# Patient Record
Sex: FEMALE | Race: OTHER | HISPANIC OR LATINO | URBAN - METROPOLITAN AREA
[De-identification: names, ages, dates, MRNs, and addresses within clinical notes are randomized per-mention and may not be internally consistent; named-entity substitution may affect disease eponyms.]

---

## 2017-05-12 ENCOUNTER — EMERGENCY (EMERGENCY)
Facility: HOSPITAL | Age: 52
LOS: 1 days | Discharge: HOME | End: 2017-05-12
Attending: EMERGENCY MEDICINE | Admitting: EMERGENCY MEDICINE
Payer: COMMERCIAL

## 2017-05-12 VITALS
HEART RATE: 97 BPM | OXYGEN SATURATION: 100 % | SYSTOLIC BLOOD PRESSURE: 118 MMHG | RESPIRATION RATE: 18 BRPM | DIASTOLIC BLOOD PRESSURE: 72 MMHG | TEMPERATURE: 98 F

## 2017-05-12 DIAGNOSIS — R07.2 PRECORDIAL PAIN: ICD-10-CM

## 2017-05-12 LAB
ALBUMIN SERPL ELPH-MCNC: 3.7 G/DL — SIGNIFICANT CHANGE UP (ref 3.3–5)
ALP SERPL-CCNC: 83 U/L — SIGNIFICANT CHANGE UP (ref 40–120)
ALT FLD-CCNC: 12 U/L RC — SIGNIFICANT CHANGE UP (ref 10–45)
ANION GAP SERPL CALC-SCNC: 12 MMOL/L — SIGNIFICANT CHANGE UP (ref 5–17)
APTT BLD: 30.2 SEC — SIGNIFICANT CHANGE UP (ref 27.5–37.4)
AST SERPL-CCNC: 16 U/L — SIGNIFICANT CHANGE UP (ref 10–40)
BASOPHILS # BLD AUTO: 0 K/UL — SIGNIFICANT CHANGE UP (ref 0–0.2)
BASOPHILS NFR BLD AUTO: 0.4 % — SIGNIFICANT CHANGE UP (ref 0–2)
BILIRUB SERPL-MCNC: 0.3 MG/DL — SIGNIFICANT CHANGE UP (ref 0.2–1.2)
BUN SERPL-MCNC: 13 MG/DL — SIGNIFICANT CHANGE UP (ref 7–23)
CALCIUM SERPL-MCNC: 9.3 MG/DL — SIGNIFICANT CHANGE UP (ref 8.4–10.5)
CHLORIDE SERPL-SCNC: 103 MMOL/L — SIGNIFICANT CHANGE UP (ref 96–108)
CK MB CFR SERPL CALC: 1 NG/ML — SIGNIFICANT CHANGE UP (ref 0–3.8)
CK SERPL-CCNC: 53 U/L — SIGNIFICANT CHANGE UP (ref 25–170)
CO2 SERPL-SCNC: 24 MMOL/L — SIGNIFICANT CHANGE UP (ref 22–31)
CREAT SERPL-MCNC: 0.76 MG/DL — SIGNIFICANT CHANGE UP (ref 0.5–1.3)
EOSINOPHIL # BLD AUTO: 0.1 K/UL — SIGNIFICANT CHANGE UP (ref 0–0.5)
EOSINOPHIL NFR BLD AUTO: 1.2 % — SIGNIFICANT CHANGE UP (ref 0–6)
GAS PNL BLDV: SIGNIFICANT CHANGE UP
GLUCOSE SERPL-MCNC: 112 MG/DL — HIGH (ref 70–99)
HCG SERPL-ACNC: <2 MIU/ML — SIGNIFICANT CHANGE UP
HCT VFR BLD CALC: 33.6 % — LOW (ref 34.5–45)
HGB BLD-MCNC: 11.1 G/DL — LOW (ref 11.5–15.5)
INR BLD: 1.01 RATIO — SIGNIFICANT CHANGE UP (ref 0.88–1.16)
LIDOCAIN IGE QN: 30 U/L — SIGNIFICANT CHANGE UP (ref 7–60)
LYMPHOCYTES # BLD AUTO: 2.7 K/UL — SIGNIFICANT CHANGE UP (ref 1–3.3)
LYMPHOCYTES # BLD AUTO: 27.5 % — SIGNIFICANT CHANGE UP (ref 13–44)
MCHC RBC-ENTMCNC: 25.3 PG — LOW (ref 27–34)
MCHC RBC-ENTMCNC: 33 GM/DL — SIGNIFICANT CHANGE UP (ref 32–36)
MCV RBC AUTO: 76.5 FL — LOW (ref 80–100)
MONOCYTES # BLD AUTO: 0.7 K/UL — SIGNIFICANT CHANGE UP (ref 0–0.9)
MONOCYTES NFR BLD AUTO: 7.5 % — SIGNIFICANT CHANGE UP (ref 2–14)
NEUTROPHILS # BLD AUTO: 6.3 K/UL — SIGNIFICANT CHANGE UP (ref 1.8–7.4)
NEUTROPHILS NFR BLD AUTO: 63.3 % — SIGNIFICANT CHANGE UP (ref 43–77)
PLATELET # BLD AUTO: 236 K/UL — SIGNIFICANT CHANGE UP (ref 150–400)
POTASSIUM SERPL-MCNC: 4 MMOL/L — SIGNIFICANT CHANGE UP (ref 3.5–5.3)
POTASSIUM SERPL-SCNC: 4 MMOL/L — SIGNIFICANT CHANGE UP (ref 3.5–5.3)
PROT SERPL-MCNC: 7 G/DL — SIGNIFICANT CHANGE UP (ref 6–8.3)
PROTHROM AB SERPL-ACNC: 11 SEC — SIGNIFICANT CHANGE UP (ref 9.8–12.7)
RBC # BLD: 4.39 M/UL — SIGNIFICANT CHANGE UP (ref 3.8–5.2)
RBC # FLD: 16 % — HIGH (ref 10.3–14.5)
SODIUM SERPL-SCNC: 139 MMOL/L — SIGNIFICANT CHANGE UP (ref 135–145)
TROPONIN T SERPL-MCNC: <0.01 NG/ML — SIGNIFICANT CHANGE UP (ref 0–0.06)
WBC # BLD: 9.9 K/UL — SIGNIFICANT CHANGE UP (ref 3.8–10.5)
WBC # FLD AUTO: 9.9 K/UL — SIGNIFICANT CHANGE UP (ref 3.8–10.5)

## 2017-05-12 PROCEDURE — 76705 ECHO EXAM OF ABDOMEN: CPT | Mod: 26

## 2017-05-12 PROCEDURE — 71010: CPT | Mod: 26

## 2017-05-12 PROCEDURE — 99285 EMERGENCY DEPT VISIT HI MDM: CPT | Mod: 25

## 2017-05-12 PROCEDURE — 93010 ELECTROCARDIOGRAM REPORT: CPT

## 2017-05-12 RX ORDER — ASPIRIN/CALCIUM CARB/MAGNESIUM 324 MG
324 TABLET ORAL ONCE
Qty: 0 | Refills: 0 | Status: COMPLETED | OUTPATIENT
Start: 2017-05-12 | End: 2017-05-12

## 2017-05-12 RX ORDER — ASPIRIN/CALCIUM CARB/MAGNESIUM 324 MG
234 TABLET ORAL ONCE
Qty: 0 | Refills: 0 | Status: DISCONTINUED | OUTPATIENT
Start: 2017-05-12 | End: 2017-05-12

## 2017-05-12 RX ORDER — SODIUM CHLORIDE 9 MG/ML
1000 INJECTION INTRAMUSCULAR; INTRAVENOUS; SUBCUTANEOUS ONCE
Qty: 0 | Refills: 0 | Status: COMPLETED | OUTPATIENT
Start: 2017-05-12 | End: 2017-05-12

## 2017-05-12 RX ORDER — ASPIRIN/CALCIUM CARB/MAGNESIUM 324 MG
324 TABLET ORAL DAILY
Qty: 0 | Refills: 0 | Status: DISCONTINUED | OUTPATIENT
Start: 2017-05-12 | End: 2017-05-12

## 2017-05-12 RX ORDER — FAMOTIDINE 10 MG/ML
20 INJECTION INTRAVENOUS ONCE
Qty: 0 | Refills: 0 | Status: COMPLETED | OUTPATIENT
Start: 2017-05-12 | End: 2017-05-12

## 2017-05-12 RX ADMIN — Medication 324 MILLIGRAM(S): at 22:30

## 2017-05-12 RX ADMIN — FAMOTIDINE 20 MILLIGRAM(S): 10 INJECTION INTRAVENOUS at 22:30

## 2017-05-12 RX ADMIN — SODIUM CHLORIDE 1000 MILLILITER(S): 9 INJECTION INTRAMUSCULAR; INTRAVENOUS; SUBCUTANEOUS at 22:30

## 2017-05-12 RX ADMIN — Medication 30 MILLILITER(S): at 22:30

## 2017-05-12 NOTE — ED ADULT NURSE NOTE - CHIEF COMPLAINT QUOTE
cp cp    2141: attempted to do an EKG at 2131, pt states she has to go to the bathroom and been in there since.

## 2017-05-12 NOTE — ED ADULT NURSE NOTE - OBJECTIVE STATEMENT
51 year old female A&OX3 PMHX of CAD, HTN, Carotid artery occlusion, presents with chest pain radiating to the right arm since 1700 today. Patient states that she was sitting in her car when pain began. Patient also notes that pain is accompanied by nausea. Patient also reports weakness in bilateral lower extremities. Patient denies shortness of breath, fevers, chills.

## 2017-05-12 NOTE — ED PROVIDER NOTE - PLAN OF CARE
You were seen in the ED for chest pain, your blood work and CAT scans do not show any concerning abnormalities at this time. It is not entirely clear what caused the pain however it does not appear to be anything immediately dangerous.     Please see our heart doctors in 2-3 days to schedule a stress test of the heart.     Return to the ED for worsening pain, trouble breathing or any other concerns

## 2017-05-12 NOTE — ED PROVIDER NOTE - CARE PLAN
Principal Discharge DX:	Chest pain  Instructions for follow-up, activity and diet:	You were seen in the ED for chest pain, your blood work and CAT scans do not show any concerning abnormalities at this time. It is not entirely clear what caused the pain however it does not appear to be anything immediately dangerous.     Please see our heart doctors in 2-3 days to schedule a stress test of the heart.     Return to the ED for worsening pain, trouble breathing or any other concerns

## 2017-05-12 NOTE — ED PROVIDER NOTE - SHIFT CHANGE DETAILS
**ATTENDING ADDENDUM (Dr. Jorge Acosta): (1) followup serial troponin at 04:20AM (2) followup formal CT readings (3) serial reassessments (4) IF serial reassessments demonstrate clinical improvement, and if ED diagnostics are improved, consider discharge home with close outpatient followup with primary care physician/provider (may require translation services)

## 2017-05-12 NOTE — ED PROVIDER NOTE - MEDICAL DECISION MAKING DETAILS
**ATTENDING MEDICAL DECISION MAKING/SYNTHESIS (Dr. Jorge Acosta): I have reviewed the Chief Complaint, the HPI, the ROS, and have directly performed and confirmed the findings on the Physical Examination. I have reviewed the medical decision making with all providers, as applicable. The PROBLEM REPRESENTATION at this time is: 51-year-old woman with history of obstructive sleep apnea, asthma, hyperlipidemia, Hypertension, and morbid obesity now presenting with acute substernal chest pain with radiation to the right shoulder and back associated with nausea and vomiting; took one baby aspirin at home prior to arrival with some improvement (but still with persistent back pain). The MOST LIKELY DIAGNOSIS, and the LIST OF DIFFERENTIAL DIAGNOSES, includes (but is not limited to) the following: acute coronary syndrome (considered, HEART score=3, likely delta troponins), pulmonary embolism/deep venous thrombosis (unlikely given presentation and clinical findings), GI-associated e.g. acute biliary disease (e.g. cholelithiasis, cholecystitis, or cholangitis), peptic ulcer, pancreatitis, or equivalent (possible), dehydration, electrolyte-metabolic-endocrine derangements, pulmonary-associated (unlikely given presentation and clinical findings), vascular e.g. AAA or aortic dissection (unlikely, bilateral pulses and BPs in both arms appear to be symmetrical). The likelihood of each of these diagnoses has been appropriately considered in the context of this patient's presentation and my evaluation. PLAN: as described in EMR, including diagnostics, therapeutics and consultation as clinically warranted. I will continue to reevaluate the patient, including the results of all testing, and monitor response to therapy throughout the patient's course in the ED.

## 2017-05-12 NOTE — ED PROVIDER NOTE - OBJECTIVE STATEMENT
51yF obese, h/o HTN, on daily ASA, ANIVAL on CPAP, asthma presents after episode of sudden onset substernal chest paint hat began at rest radiated to her R arm and back. Associated with nausea and vomiting. Pain improved after 1 baby ASA but now with persistent back pain. Felt like she was "having a heart attack"  Never smoker. No etoh or illicit substances 51yF obese, h/o HTN, on daily ASA, ANIVAL on CPAP, asthma presents after episode of sudden onset substernal chest paint hat began at rest radiated to her R arm and back. Associated with nausea and vomiting. Pain improved after 1 baby ASA but now with persistent back pain. Felt like she was "having a heart attack"  Never smoker. No etoh or illicit substances. No exogenous estrogen. Unknown provoking factors.  PMD/Cards Kiko Morris 51yF obese, h/o HTN, on daily ASA, ANIVAL on CPAP, asthma presents after episode of sudden onset substernal chest paint hat began at rest radiated to her R arm and back. Associated with nausea and vomiting. Pain improved after 1 baby ASA but now with persistent back pain. Felt like she was "having a heart attack"  Never smoker. No etoh or illicit substances. No exogenous estrogen. Unknown provoking factors.  PMD/Cards Kiko Morris (unattached) 51yF obese, h/o HTN, on daily ASA, ANIVAL on CPAP, asthma presents after episode of sudden onset substernal chest paint hat began at rest radiated to her R arm and back. Associated with nausea and vomiting. Pain improved after 1 baby ASA but now with persistent back pain. Felt like she was "having a heart attack"  Never smoker. No etoh or illicit substances. No exogenous estrogen. Unknown provoking factors.  PMD/Cards Kiko Morris (unattached)  **ATTENDING ADDENDUM (Dr. Jorge Acosta): I attest that I have directly and personally interviewed and examined this patient and elicited a comparable history of present illness and review of systems as documented, along with my EM resident. I attest that I have made significant contributions to the documentation where necessary and as noted in the EMR.

## 2017-05-12 NOTE — ED PROVIDER NOTE - ATTENDING CONTRIBUTION TO CARE
**ATTENDING ADDENDUM (Dr. Jorge Acosta): I attest that I have directly examined this patient and reviewed and formulated the diagnostic and therapeutic management plan in collaboration with the EM resident. Please see MDM note and remainder of EMR for findings from CC, HPI, ROS, and PE. (Ralph)

## 2017-05-12 NOTE — ED PROVIDER NOTE - PROGRESS NOTE DETAILS
**ATTENDING ADDENDUM (Dr. Jorge Acosta): patient serially evaluated throughout ED course. NO acute deterioration up to this time in the ED. Some epigastric pain, some chest pain with radiation to the back and to the right shoulder/arm noted. Appears without severe distress at this time. Aspirin and other therapeutics as ordered in the EMR. HEART score calculated = 3 (female, hyperlipidemia, Hypertension, morbid obesity). Agree with goals/plan of ED care as noted in the EMR. Will continue to observe and monitor closely. Anticipatory guidance provided. (NOTE: EM resident Ralph's discussion made with certified  via phone). **ATTENDING ADDENDUM (Dr. Jorge Acosta): NO acute deterioration up to this time in the ED. ED diagnostics available up to this time reviewed and noted. First troponin non-diagnostic. CXR without evidence of emergent findings. Awaiting completion and readings of CTA chest/abdomen/pelvis. US noted; NO evidence of acute biliary disease (e.g. cholelithiasis, cholecystitis, or cholangitis), pancreatitis, or acute surgical abdomen at this time. Agree with delta troponin at 04:20. Will continue to observe and monitor closely. Attending MD Brunson: patient received in sign out, cardiac biomarkers negative x 2, ACS ruled out. Patient asymptomatic currently. Patient provided instructions for cardiology follow up for outpatient stress test

## 2017-05-13 VITALS
HEART RATE: 88 BPM | OXYGEN SATURATION: 100 % | TEMPERATURE: 99 F | SYSTOLIC BLOOD PRESSURE: 145 MMHG | RESPIRATION RATE: 18 BRPM | DIASTOLIC BLOOD PRESSURE: 80 MMHG

## 2017-05-13 LAB — TROPONIN T SERPL-MCNC: <0.01 NG/ML — SIGNIFICANT CHANGE UP (ref 0–0.06)

## 2017-05-13 PROCEDURE — 84702 CHORIONIC GONADOTROPIN TEST: CPT

## 2017-05-13 PROCEDURE — 83605 ASSAY OF LACTIC ACID: CPT

## 2017-05-13 PROCEDURE — 83690 ASSAY OF LIPASE: CPT

## 2017-05-13 PROCEDURE — 85610 PROTHROMBIN TIME: CPT

## 2017-05-13 PROCEDURE — 85730 THROMBOPLASTIN TIME PARTIAL: CPT

## 2017-05-13 PROCEDURE — 74174 CTA ABD&PLVS W/CONTRAST: CPT | Mod: 26

## 2017-05-13 PROCEDURE — 71045 X-RAY EXAM CHEST 1 VIEW: CPT

## 2017-05-13 PROCEDURE — 82947 ASSAY GLUCOSE BLOOD QUANT: CPT

## 2017-05-13 PROCEDURE — 82550 ASSAY OF CK (CPK): CPT

## 2017-05-13 PROCEDURE — 71275 CT ANGIOGRAPHY CHEST: CPT

## 2017-05-13 PROCEDURE — 74174 CTA ABD&PLVS W/CONTRAST: CPT

## 2017-05-13 PROCEDURE — 84132 ASSAY OF SERUM POTASSIUM: CPT

## 2017-05-13 PROCEDURE — 84295 ASSAY OF SERUM SODIUM: CPT

## 2017-05-13 PROCEDURE — 96374 THER/PROPH/DIAG INJ IV PUSH: CPT | Mod: XU

## 2017-05-13 PROCEDURE — 85014 HEMATOCRIT: CPT

## 2017-05-13 PROCEDURE — 82553 CREATINE MB FRACTION: CPT

## 2017-05-13 PROCEDURE — 82803 BLOOD GASES ANY COMBINATION: CPT

## 2017-05-13 PROCEDURE — 84484 ASSAY OF TROPONIN QUANT: CPT

## 2017-05-13 PROCEDURE — 71275 CT ANGIOGRAPHY CHEST: CPT | Mod: 26

## 2017-05-13 PROCEDURE — 80053 COMPREHEN METABOLIC PANEL: CPT

## 2017-05-13 PROCEDURE — 82435 ASSAY OF BLOOD CHLORIDE: CPT

## 2017-05-13 PROCEDURE — 93005 ELECTROCARDIOGRAM TRACING: CPT

## 2017-05-13 PROCEDURE — 85027 COMPLETE CBC AUTOMATED: CPT

## 2017-05-13 PROCEDURE — 99284 EMERGENCY DEPT VISIT MOD MDM: CPT | Mod: 25

## 2017-05-13 PROCEDURE — 82330 ASSAY OF CALCIUM: CPT

## 2017-05-13 PROCEDURE — 76705 ECHO EXAM OF ABDOMEN: CPT

## 2017-05-13 NOTE — ED ADULT NURSE REASSESSMENT NOTE - NS ED NURSE REASSESS COMMENT FT1
received report imer Mace RN. patient resting comfortably in bed. in no acute distress and denies pain at this time. repeat trop due at 0430.

## 2017-05-18 ENCOUNTER — INPATIENT (INPATIENT)
Facility: HOSPITAL | Age: 52
LOS: 2 days | Discharge: HOME | DRG: 313 | End: 2017-05-21
Attending: INTERNAL MEDICINE | Admitting: INTERNAL MEDICINE
Payer: COMMERCIAL

## 2017-05-18 VITALS
SYSTOLIC BLOOD PRESSURE: 104 MMHG | DIASTOLIC BLOOD PRESSURE: 65 MMHG | TEMPERATURE: 98 F | OXYGEN SATURATION: 100 % | RESPIRATION RATE: 20 BRPM | HEART RATE: 90 BPM

## 2017-05-18 LAB
ALBUMIN SERPL ELPH-MCNC: 3.4 G/DL — SIGNIFICANT CHANGE UP (ref 3.3–5)
ALP SERPL-CCNC: 81 U/L — SIGNIFICANT CHANGE UP (ref 40–120)
ALT FLD-CCNC: 14 U/L RC — SIGNIFICANT CHANGE UP (ref 10–45)
ANION GAP SERPL CALC-SCNC: 11 MMOL/L — SIGNIFICANT CHANGE UP (ref 5–17)
APTT BLD: 23.4 SEC — LOW (ref 27.5–37.4)
AST SERPL-CCNC: 21 U/L — SIGNIFICANT CHANGE UP (ref 10–40)
BASOPHILS # BLD AUTO: 0 K/UL — SIGNIFICANT CHANGE UP (ref 0–0.2)
BASOPHILS NFR BLD AUTO: 0.3 % — SIGNIFICANT CHANGE UP (ref 0–2)
BILIRUB SERPL-MCNC: 0.2 MG/DL — SIGNIFICANT CHANGE UP (ref 0.2–1.2)
BUN SERPL-MCNC: 16 MG/DL — SIGNIFICANT CHANGE UP (ref 7–23)
CALCIUM SERPL-MCNC: 9.1 MG/DL — SIGNIFICANT CHANGE UP (ref 8.4–10.5)
CHLORIDE SERPL-SCNC: 105 MMOL/L — SIGNIFICANT CHANGE UP (ref 96–108)
CO2 SERPL-SCNC: 23 MMOL/L — SIGNIFICANT CHANGE UP (ref 22–31)
CREAT SERPL-MCNC: 0.76 MG/DL — SIGNIFICANT CHANGE UP (ref 0.5–1.3)
EOSINOPHIL # BLD AUTO: 0.1 K/UL — SIGNIFICANT CHANGE UP (ref 0–0.5)
EOSINOPHIL NFR BLD AUTO: 1.1 % — SIGNIFICANT CHANGE UP (ref 0–6)
GAS PNL BLDV: SIGNIFICANT CHANGE UP
GLUCOSE SERPL-MCNC: 99 MG/DL — SIGNIFICANT CHANGE UP (ref 70–99)
HCG SERPL QL: NEGATIVE — SIGNIFICANT CHANGE UP
HCT VFR BLD CALC: 33.2 % — LOW (ref 34.5–45)
HGB BLD-MCNC: 10.7 G/DL — LOW (ref 11.5–15.5)
INR BLD: 1.02 RATIO — SIGNIFICANT CHANGE UP (ref 0.88–1.16)
LYMPHOCYTES # BLD AUTO: 3.3 K/UL — SIGNIFICANT CHANGE UP (ref 1–3.3)
LYMPHOCYTES # BLD AUTO: 37.7 % — SIGNIFICANT CHANGE UP (ref 13–44)
MCHC RBC-ENTMCNC: 24.8 PG — LOW (ref 27–34)
MCHC RBC-ENTMCNC: 32.3 GM/DL — SIGNIFICANT CHANGE UP (ref 32–36)
MCV RBC AUTO: 76.9 FL — LOW (ref 80–100)
MONOCYTES # BLD AUTO: 0.6 K/UL — SIGNIFICANT CHANGE UP (ref 0–0.9)
MONOCYTES NFR BLD AUTO: 7.4 % — SIGNIFICANT CHANGE UP (ref 2–14)
NEUTROPHILS # BLD AUTO: 4.7 K/UL — SIGNIFICANT CHANGE UP (ref 1.8–7.4)
NEUTROPHILS NFR BLD AUTO: 53.5 % — SIGNIFICANT CHANGE UP (ref 43–77)
PLATELET # BLD AUTO: 247 K/UL — SIGNIFICANT CHANGE UP (ref 150–400)
POTASSIUM SERPL-MCNC: 4.6 MMOL/L — SIGNIFICANT CHANGE UP (ref 3.5–5.3)
POTASSIUM SERPL-SCNC: 4.6 MMOL/L — SIGNIFICANT CHANGE UP (ref 3.5–5.3)
PROT SERPL-MCNC: 6.9 G/DL — SIGNIFICANT CHANGE UP (ref 6–8.3)
PROTHROM AB SERPL-ACNC: 11.1 SEC — SIGNIFICANT CHANGE UP (ref 9.8–12.7)
RBC # BLD: 4.31 M/UL — SIGNIFICANT CHANGE UP (ref 3.8–5.2)
RBC # FLD: 15.9 % — HIGH (ref 10.3–14.5)
SODIUM SERPL-SCNC: 139 MMOL/L — SIGNIFICANT CHANGE UP (ref 135–145)
TROPONIN T SERPL-MCNC: <0.01 NG/ML — SIGNIFICANT CHANGE UP (ref 0–0.06)
WBC # BLD: 8.7 K/UL — SIGNIFICANT CHANGE UP (ref 3.8–10.5)
WBC # FLD AUTO: 8.7 K/UL — SIGNIFICANT CHANGE UP (ref 3.8–10.5)

## 2017-05-18 PROCEDURE — 99285 EMERGENCY DEPT VISIT HI MDM: CPT | Mod: 25

## 2017-05-18 PROCEDURE — 93010 ELECTROCARDIOGRAM REPORT: CPT

## 2017-05-18 RX ORDER — SODIUM CHLORIDE 9 MG/ML
3 INJECTION INTRAMUSCULAR; INTRAVENOUS; SUBCUTANEOUS ONCE
Qty: 0 | Refills: 0 | Status: COMPLETED | OUTPATIENT
Start: 2017-05-18 | End: 2017-05-18

## 2017-05-18 RX ORDER — ASPIRIN/CALCIUM CARB/MAGNESIUM 324 MG
325 TABLET ORAL ONCE
Qty: 0 | Refills: 0 | Status: COMPLETED | OUTPATIENT
Start: 2017-05-18 | End: 2017-05-18

## 2017-05-18 RX ADMIN — Medication 325 MILLIGRAM(S): at 22:21

## 2017-05-18 RX ADMIN — SODIUM CHLORIDE 3 MILLILITER(S): 9 INJECTION INTRAMUSCULAR; INTRAVENOUS; SUBCUTANEOUS at 22:21

## 2017-05-18 NOTE — ED PROVIDER NOTE - CARE PLAN
Principal Discharge DX:	Chest pain  Instructions for follow-up, activity and diet:	Patient wishes to leave the emergency department at this time.  The patient understands that they are leaving against medical advice despite the risk of missing a potentially serious diagnosis which may lead to injury, disability and/or death.  The patient demonstrates understanding of all risks, is awake and alert and demonstrates competence to make medical decisions.  I was unable to convince the patient to stay for further workup and monitoring.  The patient was given an opportunity to ask any questions.   The patient understands that they may return at any time if desired.  I discussed the importance of close, prompt medical follow-up. Principal Discharge DX:	Chest pain

## 2017-05-18 NOTE — ED PROVIDER NOTE - OBJECTIVE STATEMENT
51 F pmh asthma, HTN, GERD presents with chest pain 51 F pmh asthma, HTN, GERD presents with sudden onset chest pain while riding in car.  Pt was in different ED last week for similar symptoms and discharged.  Pt states chest pain is central, intermittent, 5/10 pressure, associated with nausea, weakness, and shortness of breath.  Pt is not currently experiencing pain.  Pt states it feels like her asthma. but she is not wheezing.  Pt denies abd pain, vomiting, diarrhea, fever/chills, dysuria, rash.    PMD Dr. Arturo Han, DO

## 2017-05-18 NOTE — ED PROVIDER NOTE - ATTENDING CONTRIBUTION TO CARE
Private Physician Kiko MorrisOakley  51y female pmh Hypertension,asthma,hld,no habits no travel, cancer, no ocp; pt comes to ed complains of sudden onset of chest pain dizzyness and weakness with nausea, without vomiting. and shortness of breath, No feeling mod improved. No radiation. 4/10 squeezing. waxing and waning. PE WDWN mild distress. Chest clear anterior & posterior abd soft +bs neuro no focal defects. CV no rubs, gallops or murmurs  Piero Mackenzie MD, Facep

## 2017-05-18 NOTE — ED PROVIDER NOTE - PROGRESS NOTE DETAILS
Dr Morris callled no answer  Piero Mackenzie MD, Facep Paged Unattached Cards, NA  Called Dr Hickey (known relationship with Dr Morris in past)   Dr Hickey will accept pt no bed cdu  Piero Mackenzie MD, Facep Pt request immediate dc home. Risks understood and accepted, including MI. Will follow up as opt  Piero Mackenzie MD, Facep Endorsed to Dr Dennis Mackenzie MD, Facep Pt request immediate dc home. Risks understood and accepted, including MI. Pt contemplating decision. If not will follow up as opt.  Piero Mackenzie MD, Facep Pt does not want to be admitted and wants to leave hospital. c Pt request to ama, then changes mind and wants to be admitted.

## 2017-05-18 NOTE — ED ADULT NURSE NOTE - NS ED NURSE DISCH DISPOSITION
AMA (saw a physician/midlevel provider and clinician was able to provide reasons for staying for treatment & form is signed) Admitted

## 2017-05-18 NOTE — ED PROVIDER NOTE - CONDUCTED A DETAILED DISCUSSION WITH PATIENT AND/OR GUARDIAN REGARDING, MDM
need to admit/return to ED if symptoms worsen, persist or questions arise/radiology results/lab results

## 2017-05-18 NOTE — ED PROVIDER NOTE - PHYSICAL EXAMINATION
Gen: NAD, AOx3  Head: NCAT  HEENT: PERRL, oral mucosa moist, normal conjunctiva  Lung: CTAB, no respiratory distress  CV: rrr, no murmurs, Normal perfusion  Abd: soft, NTND  MSK: No edema, no visible deformities  Neuro: No focal neurologic deficits, normal gait  Skin: No rash   Psych: normal affect   - Mindy Han, DO

## 2017-05-18 NOTE — ED ADULT NURSE NOTE - OBJECTIVE STATEMENT
2250 pt 51yf aox4 presented w/ cp denies cardiac hx, vss pt noted morbidly obese, resident at bedside/pt w/ family/vss/jesse

## 2017-05-19 ENCOUNTER — TRANSCRIPTION ENCOUNTER (OUTPATIENT)
Age: 52
End: 2017-05-19

## 2017-05-19 DIAGNOSIS — I10 ESSENTIAL (PRIMARY) HYPERTENSION: ICD-10-CM

## 2017-05-19 DIAGNOSIS — M19.90 UNSPECIFIED OSTEOARTHRITIS, UNSPECIFIED SITE: ICD-10-CM

## 2017-05-19 DIAGNOSIS — R07.9 CHEST PAIN, UNSPECIFIED: ICD-10-CM

## 2017-05-19 DIAGNOSIS — Z29.9 ENCOUNTER FOR PROPHYLACTIC MEASURES, UNSPECIFIED: ICD-10-CM

## 2017-05-19 DIAGNOSIS — G43.909 MIGRAINE, UNSPECIFIED, NOT INTRACTABLE, WITHOUT STATUS MIGRAINOSUS: ICD-10-CM

## 2017-05-19 DIAGNOSIS — K21.9 GASTRO-ESOPHAGEAL REFLUX DISEASE WITHOUT ESOPHAGITIS: ICD-10-CM

## 2017-05-19 LAB
BASOPHILS # BLD AUTO: 0.02 K/UL — SIGNIFICANT CHANGE UP (ref 0–0.2)
BASOPHILS NFR BLD AUTO: 0.2 % — SIGNIFICANT CHANGE UP (ref 0–2)
CK MB CFR SERPL CALC: 1 NG/ML — SIGNIFICANT CHANGE UP (ref 0–3.8)
EOSINOPHIL # BLD AUTO: 0.11 K/UL — SIGNIFICANT CHANGE UP (ref 0–0.5)
EOSINOPHIL NFR BLD AUTO: 1.3 % — SIGNIFICANT CHANGE UP (ref 0–6)
FERRITIN SERPL-MCNC: 8 NG/ML — LOW (ref 15–150)
FOLATE SERPL-MCNC: >20 NG/ML — SIGNIFICANT CHANGE UP (ref 4.8–24.2)
HBA1C BLD-MCNC: 6 % — HIGH (ref 4–5.6)
HCT VFR BLD CALC: 33.1 % — LOW (ref 34.5–45)
HGB BLD-MCNC: 10 G/DL — LOW (ref 11.5–15.5)
IMM GRANULOCYTES NFR BLD AUTO: 0.1 % — SIGNIFICANT CHANGE UP (ref 0–1.5)
IRON SATN MFR SERPL: 29 UG/DL — LOW (ref 30–160)
IRON SATN MFR SERPL: 9 % — LOW (ref 14–50)
LYMPHOCYTES # BLD AUTO: 2.69 K/UL — SIGNIFICANT CHANGE UP (ref 1–3.3)
LYMPHOCYTES # BLD AUTO: 32.1 % — SIGNIFICANT CHANGE UP (ref 13–44)
MCHC RBC-ENTMCNC: 22.6 PG — LOW (ref 27–34)
MCHC RBC-ENTMCNC: 30.2 GM/DL — LOW (ref 32–36)
MCV RBC AUTO: 74.9 FL — LOW (ref 80–100)
MONOCYTES # BLD AUTO: 0.52 K/UL — SIGNIFICANT CHANGE UP (ref 0–0.9)
MONOCYTES NFR BLD AUTO: 6.2 % — SIGNIFICANT CHANGE UP (ref 2–14)
NEUTROPHILS # BLD AUTO: 5.02 K/UL — SIGNIFICANT CHANGE UP (ref 1.8–7.4)
NEUTROPHILS NFR BLD AUTO: 60.1 % — SIGNIFICANT CHANGE UP (ref 43–77)
PLATELET # BLD AUTO: 244 K/UL — SIGNIFICANT CHANGE UP (ref 150–400)
RBC # BLD: 4.42 M/UL — SIGNIFICANT CHANGE UP (ref 3.8–5.2)
RBC # FLD: 17.3 % — HIGH (ref 10.3–14.5)
TIBC SERPL-MCNC: 330 UG/DL — SIGNIFICANT CHANGE UP (ref 220–430)
TROPONIN T SERPL-MCNC: <0.01 NG/ML — SIGNIFICANT CHANGE UP (ref 0–0.06)
UIBC SERPL-MCNC: 301 UG/DL — SIGNIFICANT CHANGE UP (ref 110–370)
VIT B12 SERPL-MCNC: 283 PG/ML — SIGNIFICANT CHANGE UP (ref 243–894)
WBC # BLD: 8.37 K/UL — SIGNIFICANT CHANGE UP (ref 3.8–10.5)
WBC # FLD AUTO: 8.37 K/UL — SIGNIFICANT CHANGE UP (ref 3.8–10.5)

## 2017-05-19 PROCEDURE — 71010: CPT | Mod: 26

## 2017-05-19 PROCEDURE — 99223 1ST HOSP IP/OBS HIGH 75: CPT

## 2017-05-19 RX ORDER — PROPRANOLOL HCL 160 MG
20 CAPSULE, EXTENDED RELEASE 24HR ORAL
Qty: 0 | Refills: 0 | Status: DISCONTINUED | OUTPATIENT
Start: 2017-05-19 | End: 2017-05-21

## 2017-05-19 RX ORDER — LOSARTAN POTASSIUM 100 MG/1
50 TABLET, FILM COATED ORAL DAILY
Qty: 0 | Refills: 0 | Status: DISCONTINUED | OUTPATIENT
Start: 2017-05-19 | End: 2017-05-21

## 2017-05-19 RX ORDER — SULINDAC 200 MG/1
200 TABLET ORAL
Qty: 0 | Refills: 0 | Status: DISCONTINUED | OUTPATIENT
Start: 2017-05-19 | End: 2017-05-21

## 2017-05-19 RX ORDER — PANTOPRAZOLE SODIUM 20 MG/1
1 TABLET, DELAYED RELEASE ORAL
Qty: 30 | Refills: 0
Start: 2017-05-19 | End: 2017-06-18

## 2017-05-19 RX ORDER — ASPIRIN/CALCIUM CARB/MAGNESIUM 324 MG
1 TABLET ORAL
Qty: 0 | Refills: 0 | COMMUNITY

## 2017-05-19 RX ORDER — PANTOPRAZOLE SODIUM 20 MG/1
40 TABLET, DELAYED RELEASE ORAL
Qty: 0 | Refills: 0 | Status: DISCONTINUED | OUTPATIENT
Start: 2017-05-19 | End: 2017-05-21

## 2017-05-19 RX ORDER — ASPIRIN/CALCIUM CARB/MAGNESIUM 324 MG
81 TABLET ORAL DAILY
Qty: 0 | Refills: 0 | Status: DISCONTINUED | OUTPATIENT
Start: 2017-05-19 | End: 2017-05-21

## 2017-05-19 RX ORDER — ATORVASTATIN CALCIUM 80 MG/1
40 TABLET, FILM COATED ORAL AT BEDTIME
Qty: 0 | Refills: 0 | Status: DISCONTINUED | OUTPATIENT
Start: 2017-05-19 | End: 2017-05-21

## 2017-05-19 RX ADMIN — Medication 81 MILLIGRAM(S): at 12:25

## 2017-05-19 RX ADMIN — Medication 20 MILLIGRAM(S): at 05:40

## 2017-05-19 RX ADMIN — SULINDAC 200 MILLIGRAM(S): 200 TABLET ORAL at 18:52

## 2017-05-19 RX ADMIN — ATORVASTATIN CALCIUM 40 MILLIGRAM(S): 80 TABLET, FILM COATED ORAL at 22:19

## 2017-05-19 RX ADMIN — SULINDAC 200 MILLIGRAM(S): 200 TABLET ORAL at 05:40

## 2017-05-19 RX ADMIN — SULINDAC 200 MILLIGRAM(S): 200 TABLET ORAL at 06:37

## 2017-05-19 RX ADMIN — SULINDAC 200 MILLIGRAM(S): 200 TABLET ORAL at 19:52

## 2017-05-19 RX ADMIN — PANTOPRAZOLE SODIUM 40 MILLIGRAM(S): 20 TABLET, DELAYED RELEASE ORAL at 05:40

## 2017-05-19 RX ADMIN — Medication 20 MILLIGRAM(S): at 18:52

## 2017-05-19 RX ADMIN — LOSARTAN POTASSIUM 50 MILLIGRAM(S): 100 TABLET, FILM COATED ORAL at 05:40

## 2017-05-19 NOTE — DISCHARGE NOTE ADULT - CARE PROVIDER_API CALL
Hank Seo), Cardiac Electrophysiology; Cardiovascular Disease; Internal Medicine  [id number] [address] [place] [address]  Phone: [phone]  Fax: [phone]

## 2017-05-19 NOTE — DISCHARGE NOTE ADULT - MEDICATION SUMMARY - MEDICATIONS TO TAKE
I will START or STAY ON the medications listed below when I get home from the hospital:    nabumetone 750 mg oral tablet  -- 1 tab(s) by mouth 2 times a day  -- Indication: For Migraine    Aspirin Enteric Coated 81 mg oral delayed release tablet  -- 1 tab(s) by mouth once a day  -- Indication: For Chest pain    losartan 50 mg oral tablet  -- 1 tab(s) by mouth once a day  -- Indication: For Benign Essential Hypertension    propranolol 20 mg oral tablet  -- 1 tab(s) by mouth 2 times a day  -- Indication: For Benign Essential Hypertension    atorvastatin 40 mg oral tablet  -- 1 tab(s) by mouth once a day (at bedtime)  -- Indication: For high cholestrol     ProAir HFA 90 mcg/inh inhalation aerosol  -- 2 puff(s) inhaled 4 times a day, As Needed  -- Indication: For Asthma    pantoprazole 40 mg oral delayed release tablet  -- 1 tab(s) by mouth once a day (before a meal)  -- Indication: For GERD (Gastroesophageal Reflux Disease)

## 2017-05-19 NOTE — DISCHARGE NOTE ADULT - ADDITIONAL INSTRUCTIONS
1. Please schedule an appointment with your cardiologist within 1 week for stress test  2. Please keep your appointment for your scheduled Stress Test 1. Please schedule an appointment with your cardiologist within 1 week    please had stress test negative .

## 2017-05-19 NOTE — DISCHARGE NOTE ADULT - CARE PLAN
Principal Discharge DX:	Chest pain, unspecified type  Goal:	resolved  Secondary Diagnosis:	Gastroesophageal reflux disease without esophagitis  Instructions for follow-up, activity and diet:	continue Protonix Principal Discharge DX:	Atypical chest pain  Goal:	resolved  Instructions for follow-up, activity and diet:	Low salt, low fat diet.   Weight management.   Take medications as prescribed.    No smoking.  Follow up appointments with your doctor(s)  as instructed.  Secondary Diagnosis:	Gastroesophageal reflux disease without esophagitis  Instructions for follow-up, activity and diet:	continue Protonix  Secondary Diagnosis:	Asthma  Instructions for follow-up, activity and diet:	Call your Health Care provider upon arrival home to make a follow up appointment within one week.  Take all inhalers as prescribed by your Health Care Provider.  Take steroids as prescribed by your Health Care Provider.  If your cough increases infrequency and severity and/or you have shortness of breath or increased shortness of breath call your Health Care Provider.  If you develop fever, chills, night sweats, malaise, and/or change in mental status call your Health care Provider.  Nutrition is very important.  Eat small frequent meals.  Increase your activity as tolerated.  Do not stay in bed all day  Secondary Diagnosis:	Benign Essential Hypertension  Instructions for follow-up, activity and diet:	Low salt diet  Activity as tolerated.  Take all medication as prescribed.  Follow up with your medical doctor for routine blood pressure monitoring at your next visit.  Notify your doctor if you have any of the following symptoms:   Dizziness, Lightheadedness, Blurry vision, Headache, Chest pain, Shortness of breath  Secondary Diagnosis:	Anemia  Instructions for follow-up, activity and diet:	monitor cbc  Secondary Diagnosis:	Arthritis  Instructions for follow-up, activity and diet:	continue with pain medication  Secondary Diagnosis:	Migraine  Instructions for follow-up, activity and diet:	continue with Pain medication

## 2017-05-19 NOTE — DISCHARGE NOTE ADULT - PATIENT PORTAL LINK FT
“You can access the [facility] Patient Portal, offered by [facility], by registering with the following website: [email or web address]”

## 2017-05-19 NOTE — DISCHARGE NOTE ADULT - SECONDARY DIAGNOSIS.
Gastroesophageal reflux disease without esophagitis Asthma Benign Essential Hypertension Anemia Arthritis Migraine

## 2017-05-19 NOTE — H&P ADULT. - LAB RESULTS AND INTERPRETATION
Labs personally reviewed and interpreted:  no leukocytosis, Hb 10.7, platelet 247  electrolytes normal  troponin <0.01  serum pregnancy negative

## 2017-05-19 NOTE — H&P ADULT. - NEGATIVE CARDIOVASCULAR SYMPTOMS
no orthopnea/no palpitations/no peripheral edema/no dyspnea on exertion/no paroxysmal nocturnal dyspnea

## 2017-05-19 NOTE — H&P ADULT. - OTHER
Old records reviewed:  CTA chest May 13, 2017: no thoracic aneurysm;  There are pulmonary nodules; 3mm adnexal cystic lesion; 0.7cm hypodense lesion in liver  Hb 11.1 May 12, 2017;   CXR no focal consolidation May 12, 2017

## 2017-05-19 NOTE — H&P ADULT. - FAMILY HISTORY
Mother  Still living? Unknown  Family history of uterine cancer, Age at diagnosis: Age Unknown  Family history of hypertension, Age at diagnosis: Age Unknown     Father  Still living? Unknown  Family history of hypertension, Age at diagnosis: Age Unknown

## 2017-05-19 NOTE — DISCHARGE NOTE ADULT - PLAN OF CARE
resolved continue Protonix Low salt, low fat diet.   Weight management.   Take medications as prescribed.    No smoking.  Follow up appointments with your doctor(s)  as instructed. continue with Pain medication continue with pain medication monitor cbc Call your Health Care provider upon arrival home to make a follow up appointment within one week.  Take all inhalers as prescribed by your Health Care Provider.  Take steroids as prescribed by your Health Care Provider.  If your cough increases infrequency and severity and/or you have shortness of breath or increased shortness of breath call your Health Care Provider.  If you develop fever, chills, night sweats, malaise, and/or change in mental status call your Health care Provider.  Nutrition is very important.  Eat small frequent meals.  Increase your activity as tolerated.  Do not stay in bed all day Low salt diet  Activity as tolerated.  Take all medication as prescribed.  Follow up with your medical doctor for routine blood pressure monitoring at your next visit.  Notify your doctor if you have any of the following symptoms:   Dizziness, Lightheadedness, Blurry vision, Headache, Chest pain, Shortness of breath

## 2017-05-19 NOTE — H&P ADULT. - PROBLEM SELECTOR PLAN 1
patient with chest pain, likely musculoskeletal vs. reflux related  will rule out ACS  trend cardiac enzymes  telemetry monitoring  c/w aspirin and atorvastatin  patient is scheduled for stress test as outpatient with her own cardiologist.

## 2017-05-19 NOTE — H&P ADULT. - HISTORY OF PRESENT ILLNESS
52 yo Female with PMH of GERD, HTN, asthma presents here with chest pain.  Patient states that she was in the car on the passenger seat when she suddenly started having chest pain.  Pain is located substernally, nonradiating, associated with diaphoresis and SOB.  Patient states she was also feeling panicky at that time because she did not take aspirin and atorvastatin the night before.  Pain did not change with breathing or position.  She says pain lasted for about 10 to 15 min.  She states that she had a stress test done one year ago and scheduled for another stress test next week with her cardiologist.  Currently patient denies any chest pain.     She had similar episode of chest pain last week, also had chest pain while she was on the passenger seat.  That time pain was worse and was taken to the hospital where she had EKG and CTA chest done, which were all negative and patient was discharged.

## 2017-05-20 LAB
HCV AB S/CO SERPL IA: 0.11 S/CO — SIGNIFICANT CHANGE UP
HCV AB SERPL-IMP: SIGNIFICANT CHANGE UP

## 2017-05-20 RX ADMIN — SULINDAC 200 MILLIGRAM(S): 200 TABLET ORAL at 17:53

## 2017-05-20 RX ADMIN — PANTOPRAZOLE SODIUM 40 MILLIGRAM(S): 20 TABLET, DELAYED RELEASE ORAL at 05:14

## 2017-05-20 RX ADMIN — Medication 20 MILLIGRAM(S): at 05:14

## 2017-05-20 RX ADMIN — Medication 81 MILLIGRAM(S): at 13:06

## 2017-05-20 RX ADMIN — SULINDAC 200 MILLIGRAM(S): 200 TABLET ORAL at 06:03

## 2017-05-20 RX ADMIN — Medication 20 MILLIGRAM(S): at 17:53

## 2017-05-20 RX ADMIN — ATORVASTATIN CALCIUM 40 MILLIGRAM(S): 80 TABLET, FILM COATED ORAL at 21:24

## 2017-05-20 RX ADMIN — SULINDAC 200 MILLIGRAM(S): 200 TABLET ORAL at 05:15

## 2017-05-20 RX ADMIN — LOSARTAN POTASSIUM 50 MILLIGRAM(S): 100 TABLET, FILM COATED ORAL at 05:15

## 2017-05-21 VITALS
TEMPERATURE: 98 F | SYSTOLIC BLOOD PRESSURE: 110 MMHG | RESPIRATION RATE: 17 BRPM | HEART RATE: 74 BPM | DIASTOLIC BLOOD PRESSURE: 74 MMHG | OXYGEN SATURATION: 97 %

## 2017-05-21 LAB
ALBUMIN SERPL ELPH-MCNC: 3.5 G/DL — SIGNIFICANT CHANGE UP (ref 3.3–5)
ALP SERPL-CCNC: 93 U/L — SIGNIFICANT CHANGE UP (ref 40–120)
ALT FLD-CCNC: 13 U/L — SIGNIFICANT CHANGE UP (ref 10–45)
ANION GAP SERPL CALC-SCNC: 11 MMOL/L — SIGNIFICANT CHANGE UP (ref 5–17)
ANION GAP SERPL CALC-SCNC: 22 MMOL/L — HIGH (ref 5–17)
AST SERPL-CCNC: 39 U/L — SIGNIFICANT CHANGE UP (ref 10–40)
BILIRUB SERPL-MCNC: 0.3 MG/DL — SIGNIFICANT CHANGE UP (ref 0.2–1.2)
BUN SERPL-MCNC: 16 MG/DL — SIGNIFICANT CHANGE UP (ref 7–23)
BUN SERPL-MCNC: 17 MG/DL — SIGNIFICANT CHANGE UP (ref 7–23)
CALCIUM SERPL-MCNC: 9.1 MG/DL — SIGNIFICANT CHANGE UP (ref 8.4–10.5)
CALCIUM SERPL-MCNC: 9.1 MG/DL — SIGNIFICANT CHANGE UP (ref 8.4–10.5)
CHLORIDE SERPL-SCNC: 104 MMOL/L — SIGNIFICANT CHANGE UP (ref 96–108)
CHLORIDE SERPL-SCNC: 107 MMOL/L — SIGNIFICANT CHANGE UP (ref 96–108)
CO2 SERPL-SCNC: 12 MMOL/L — LOW (ref 22–31)
CO2 SERPL-SCNC: 25 MMOL/L — SIGNIFICANT CHANGE UP (ref 22–31)
CREAT SERPL-MCNC: 0.66 MG/DL — SIGNIFICANT CHANGE UP (ref 0.5–1.3)
CREAT SERPL-MCNC: 0.69 MG/DL — SIGNIFICANT CHANGE UP (ref 0.5–1.3)
GLUCOSE SERPL-MCNC: 112 MG/DL — HIGH (ref 70–99)
GLUCOSE SERPL-MCNC: 43 MG/DL — CRITICAL LOW (ref 70–99)
HCT VFR BLD CALC: 34.7 % — SIGNIFICANT CHANGE UP (ref 34.5–45)
HGB BLD-MCNC: 11.3 G/DL — LOW (ref 11.5–15.5)
MAGNESIUM SERPL-MCNC: 2.1 MG/DL — SIGNIFICANT CHANGE UP (ref 1.6–2.6)
MCHC RBC-ENTMCNC: 25.1 PG — LOW (ref 27–34)
MCHC RBC-ENTMCNC: 32.6 GM/DL — SIGNIFICANT CHANGE UP (ref 32–36)
MCV RBC AUTO: 77.1 FL — LOW (ref 80–100)
PHOSPHATE SERPL-MCNC: 4.2 MG/DL — SIGNIFICANT CHANGE UP (ref 2.5–4.5)
PLATELET # BLD AUTO: 228 K/UL — SIGNIFICANT CHANGE UP (ref 150–400)
POTASSIUM SERPL-MCNC: 4.1 MMOL/L — SIGNIFICANT CHANGE UP (ref 3.5–5.3)
POTASSIUM SERPL-MCNC: 7.9 MMOL/L — CRITICAL HIGH (ref 3.5–5.3)
POTASSIUM SERPL-SCNC: 4.1 MMOL/L — SIGNIFICANT CHANGE UP (ref 3.5–5.3)
POTASSIUM SERPL-SCNC: 7.9 MMOL/L — CRITICAL HIGH (ref 3.5–5.3)
PROT SERPL-MCNC: 6.8 G/DL — SIGNIFICANT CHANGE UP (ref 6–8.3)
RBC # BLD: 4.5 M/UL — SIGNIFICANT CHANGE UP (ref 3.8–5.2)
RBC # FLD: 15.8 % — HIGH (ref 10.3–14.5)
SODIUM SERPL-SCNC: 140 MMOL/L — SIGNIFICANT CHANGE UP (ref 135–145)
SODIUM SERPL-SCNC: 141 MMOL/L — SIGNIFICANT CHANGE UP (ref 135–145)
WBC # BLD: 8.1 K/UL — SIGNIFICANT CHANGE UP (ref 3.8–10.5)
WBC # FLD AUTO: 8.1 K/UL — SIGNIFICANT CHANGE UP (ref 3.8–10.5)

## 2017-05-21 PROCEDURE — 82607 VITAMIN B-12: CPT

## 2017-05-21 PROCEDURE — 85014 HEMATOCRIT: CPT

## 2017-05-21 PROCEDURE — 83550 IRON BINDING TEST: CPT

## 2017-05-21 PROCEDURE — 84703 CHORIONIC GONADOTROPIN ASSAY: CPT

## 2017-05-21 PROCEDURE — 82746 ASSAY OF FOLIC ACID SERUM: CPT

## 2017-05-21 PROCEDURE — 82435 ASSAY OF BLOOD CHLORIDE: CPT

## 2017-05-21 PROCEDURE — 82947 ASSAY GLUCOSE BLOOD QUANT: CPT

## 2017-05-21 PROCEDURE — A9500: CPT

## 2017-05-21 PROCEDURE — 93005 ELECTROCARDIOGRAM TRACING: CPT

## 2017-05-21 PROCEDURE — 82330 ASSAY OF CALCIUM: CPT

## 2017-05-21 PROCEDURE — 84484 ASSAY OF TROPONIN QUANT: CPT

## 2017-05-21 PROCEDURE — 80048 BASIC METABOLIC PNL TOTAL CA: CPT

## 2017-05-21 PROCEDURE — 83605 ASSAY OF LACTIC ACID: CPT

## 2017-05-21 PROCEDURE — 86803 HEPATITIS C AB TEST: CPT

## 2017-05-21 PROCEDURE — 82803 BLOOD GASES ANY COMBINATION: CPT

## 2017-05-21 PROCEDURE — 84132 ASSAY OF SERUM POTASSIUM: CPT

## 2017-05-21 PROCEDURE — 93016 CV STRESS TEST SUPVJ ONLY: CPT

## 2017-05-21 PROCEDURE — 83735 ASSAY OF MAGNESIUM: CPT

## 2017-05-21 PROCEDURE — 84100 ASSAY OF PHOSPHORUS: CPT

## 2017-05-21 PROCEDURE — 80053 COMPREHEN METABOLIC PANEL: CPT

## 2017-05-21 PROCEDURE — 71045 X-RAY EXAM CHEST 1 VIEW: CPT

## 2017-05-21 PROCEDURE — 85610 PROTHROMBIN TIME: CPT

## 2017-05-21 PROCEDURE — 85730 THROMBOPLASTIN TIME PARTIAL: CPT

## 2017-05-21 PROCEDURE — 85027 COMPLETE CBC AUTOMATED: CPT

## 2017-05-21 PROCEDURE — 84295 ASSAY OF SERUM SODIUM: CPT

## 2017-05-21 PROCEDURE — 99285 EMERGENCY DEPT VISIT HI MDM: CPT | Mod: 25

## 2017-05-21 PROCEDURE — 78452 HT MUSCLE IMAGE SPECT MULT: CPT | Mod: 26

## 2017-05-21 PROCEDURE — G0378: CPT

## 2017-05-21 PROCEDURE — 93017 CV STRESS TEST TRACING ONLY: CPT

## 2017-05-21 PROCEDURE — 93018 CV STRESS TEST I&R ONLY: CPT

## 2017-05-21 PROCEDURE — 78452 HT MUSCLE IMAGE SPECT MULT: CPT

## 2017-05-21 PROCEDURE — 83036 HEMOGLOBIN GLYCOSYLATED A1C: CPT

## 2017-05-21 PROCEDURE — 82553 CREATINE MB FRACTION: CPT

## 2017-05-21 PROCEDURE — 82728 ASSAY OF FERRITIN: CPT

## 2017-05-21 RX ADMIN — SULINDAC 200 MILLIGRAM(S): 200 TABLET ORAL at 17:53

## 2017-05-21 RX ADMIN — Medication 81 MILLIGRAM(S): at 13:29

## 2017-05-21 RX ADMIN — Medication 20 MILLIGRAM(S): at 13:29

## 2017-05-21 RX ADMIN — PANTOPRAZOLE SODIUM 40 MILLIGRAM(S): 20 TABLET, DELAYED RELEASE ORAL at 05:13

## 2017-05-21 RX ADMIN — LOSARTAN POTASSIUM 50 MILLIGRAM(S): 100 TABLET, FILM COATED ORAL at 05:13

## 2017-05-21 RX ADMIN — SULINDAC 200 MILLIGRAM(S): 200 TABLET ORAL at 05:13

## 2017-05-21 RX ADMIN — SULINDAC 200 MILLIGRAM(S): 200 TABLET ORAL at 17:52

## 2017-05-21 RX ADMIN — SULINDAC 200 MILLIGRAM(S): 200 TABLET ORAL at 06:10

## 2017-05-22 LAB — MANUAL DIF COMMENT BLD-IMP: SIGNIFICANT CHANGE UP

## 2017-08-06 ENCOUNTER — EMERGENCY (EMERGENCY)
Facility: HOSPITAL | Age: 52
LOS: 1 days | Discharge: HOME | End: 2017-08-06
Attending: EMERGENCY MEDICINE | Admitting: EMERGENCY MEDICINE
Payer: COMMERCIAL

## 2017-08-06 VITALS
RESPIRATION RATE: 18 BRPM | SYSTOLIC BLOOD PRESSURE: 123 MMHG | DIASTOLIC BLOOD PRESSURE: 89 MMHG | HEART RATE: 70 BPM | OXYGEN SATURATION: 99 %

## 2017-08-06 VITALS
SYSTOLIC BLOOD PRESSURE: 105 MMHG | HEIGHT: 65 IN | HEART RATE: 84 BPM | RESPIRATION RATE: 18 BRPM | WEIGHT: 249.12 LBS | DIASTOLIC BLOOD PRESSURE: 75 MMHG | OXYGEN SATURATION: 99 % | TEMPERATURE: 99 F

## 2017-08-06 PROBLEM — J45.909 UNSPECIFIED ASTHMA, UNCOMPLICATED: Chronic | Status: ACTIVE | Noted: 2017-05-18

## 2017-08-06 PROCEDURE — 71046 X-RAY EXAM CHEST 2 VIEWS: CPT

## 2017-08-06 PROCEDURE — 71020: CPT | Mod: 26

## 2017-08-06 PROCEDURE — 99284 EMERGENCY DEPT VISIT MOD MDM: CPT

## 2017-08-06 PROCEDURE — 93010 ELECTROCARDIOGRAM REPORT: CPT

## 2017-08-06 PROCEDURE — 93005 ELECTROCARDIOGRAM TRACING: CPT

## 2017-08-06 PROCEDURE — 99283 EMERGENCY DEPT VISIT LOW MDM: CPT | Mod: 25

## 2017-08-06 NOTE — ED PROVIDER NOTE - CARE PLAN
Principal Discharge DX:	Thoracic back pain, unspecified back pain laterality, unspecified chronicity

## 2017-08-06 NOTE — ED PROVIDER NOTE - OBJECTIVE STATEMENT
50 y/o F pt with history of HTN, obesity, asthma, bilateral inguinal hernia, GERD presents to the ED c/o dizziness, pain her left abdomen radiating to her right back. Pt had a full cardiac workup last year, which was negative. Pt takes Losartan (100mg). Denies vomiting, fever, diarrhea, shortness of breath. No further complaints at this time. 52 y/o F pt with history of HTN, obesity, asthma, bilateral inguinal hernia, GERD presents to the ED c/o dizziness, pain her left abdomen radiating to her right back and she is worried about her heart. Pt had a full cardiac workup last year, which was negative. Pt takes Losartan (100mg). Denies vomiting, fever, diarrhea, shortness of breath. No further complaints at this time.

## 2017-12-14 ENCOUNTER — APPOINTMENT (OUTPATIENT)
Dept: OBGYN | Facility: CLINIC | Age: 52
End: 2017-12-14

## 2018-01-28 ENCOUNTER — EMERGENCY (EMERGENCY)
Facility: HOSPITAL | Age: 53
LOS: 1 days | Discharge: HOME | End: 2018-01-28
Attending: EMERGENCY MEDICINE | Admitting: EMERGENCY MEDICINE
Payer: MEDICAID

## 2018-01-28 VITALS
HEART RATE: 71 BPM | DIASTOLIC BLOOD PRESSURE: 75 MMHG | OXYGEN SATURATION: 98 % | SYSTOLIC BLOOD PRESSURE: 129 MMHG | RESPIRATION RATE: 20 BRPM

## 2018-01-28 VITALS
TEMPERATURE: 98 F | OXYGEN SATURATION: 100 % | HEIGHT: 64 IN | WEIGHT: 279.99 LBS | SYSTOLIC BLOOD PRESSURE: 126 MMHG | RESPIRATION RATE: 20 BRPM | HEART RATE: 91 BPM | DIASTOLIC BLOOD PRESSURE: 82 MMHG

## 2018-01-28 LAB
ALBUMIN SERPL ELPH-MCNC: 2.8 G/DL — LOW (ref 3.3–5)
ALP SERPL-CCNC: 97 U/L — SIGNIFICANT CHANGE UP (ref 30–120)
ALT FLD-CCNC: 18 U/L DA — SIGNIFICANT CHANGE UP (ref 10–60)
ANION GAP SERPL CALC-SCNC: 10 MMOL/L — SIGNIFICANT CHANGE UP (ref 5–17)
APPEARANCE UR: CLEAR — SIGNIFICANT CHANGE UP
APTT BLD: 29.7 SEC — SIGNIFICANT CHANGE UP (ref 27.5–37.4)
AST SERPL-CCNC: 14 U/L — SIGNIFICANT CHANGE UP (ref 10–40)
BASOPHILS # BLD AUTO: 0.1 K/UL — SIGNIFICANT CHANGE UP (ref 0–0.2)
BASOPHILS NFR BLD AUTO: 0.7 % — SIGNIFICANT CHANGE UP (ref 0–2)
BILIRUB SERPL-MCNC: 0.3 MG/DL — SIGNIFICANT CHANGE UP (ref 0.2–1.2)
BILIRUB UR-MCNC: NEGATIVE — SIGNIFICANT CHANGE UP
BUN SERPL-MCNC: 13 MG/DL — SIGNIFICANT CHANGE UP (ref 7–23)
CALCIUM SERPL-MCNC: 8.4 MG/DL — SIGNIFICANT CHANGE UP (ref 8.4–10.5)
CHLORIDE SERPL-SCNC: 103 MMOL/L — SIGNIFICANT CHANGE UP (ref 96–108)
CK MB CFR SERPL CALC: <0.3 NG/ML — SIGNIFICANT CHANGE UP (ref 0–3.6)
CK MB CFR SERPL CALC: <0.3 NG/ML — SIGNIFICANT CHANGE UP (ref 0–3.6)
CO2 SERPL-SCNC: 28 MMOL/L — SIGNIFICANT CHANGE UP (ref 22–31)
COLOR SPEC: YELLOW — SIGNIFICANT CHANGE UP
CREAT SERPL-MCNC: 1.02 MG/DL — SIGNIFICANT CHANGE UP (ref 0.5–1.3)
D DIMER BLD IA.RAPID-MCNC: 308 NG/ML DDU — HIGH
DIFF PNL FLD: NEGATIVE — SIGNIFICANT CHANGE UP
EOSINOPHIL # BLD AUTO: 0.1 K/UL — SIGNIFICANT CHANGE UP (ref 0–0.5)
EOSINOPHIL NFR BLD AUTO: 1.1 % — SIGNIFICANT CHANGE UP (ref 0–6)
GLUCOSE SERPL-MCNC: 134 MG/DL — HIGH (ref 70–99)
GLUCOSE UR QL: NEGATIVE MG/DL — SIGNIFICANT CHANGE UP
HCT VFR BLD CALC: 33.2 % — LOW (ref 34.5–45)
HGB BLD-MCNC: 10.2 G/DL — LOW (ref 11.5–15.5)
INR BLD: 1.08 RATIO — SIGNIFICANT CHANGE UP (ref 0.88–1.16)
KETONES UR-MCNC: NEGATIVE — SIGNIFICANT CHANGE UP
LEUKOCYTE ESTERASE UR-ACNC: ABNORMAL
LYMPHOCYTES # BLD AUTO: 3.2 K/UL — SIGNIFICANT CHANGE UP (ref 1–3.3)
LYMPHOCYTES # BLD AUTO: 31.6 % — SIGNIFICANT CHANGE UP (ref 13–44)
MCHC RBC-ENTMCNC: 22.2 PG — LOW (ref 27–34)
MCHC RBC-ENTMCNC: 30.9 GM/DL — LOW (ref 32–36)
MCV RBC AUTO: 71.7 FL — LOW (ref 80–100)
MONOCYTES # BLD AUTO: 0.5 K/UL — SIGNIFICANT CHANGE UP (ref 0–0.9)
MONOCYTES NFR BLD AUTO: 5.2 % — SIGNIFICANT CHANGE UP (ref 2–14)
NEUTROPHILS # BLD AUTO: 6.2 K/UL — SIGNIFICANT CHANGE UP (ref 1.8–7.4)
NEUTROPHILS NFR BLD AUTO: 61.4 % — SIGNIFICANT CHANGE UP (ref 43–77)
NITRITE UR-MCNC: NEGATIVE — SIGNIFICANT CHANGE UP
PH UR: 5 — SIGNIFICANT CHANGE UP (ref 5–8)
PLATELET # BLD AUTO: 274 K/UL — SIGNIFICANT CHANGE UP (ref 150–400)
POTASSIUM SERPL-MCNC: 3.4 MMOL/L — LOW (ref 3.5–5.3)
POTASSIUM SERPL-SCNC: 3.4 MMOL/L — LOW (ref 3.5–5.3)
PROT SERPL-MCNC: 7.3 G/DL — SIGNIFICANT CHANGE UP (ref 6–8.3)
PROT UR-MCNC: NEGATIVE MG/DL — SIGNIFICANT CHANGE UP
PROTHROM AB SERPL-ACNC: 11.8 SEC — SIGNIFICANT CHANGE UP (ref 9.8–12.7)
RBC # BLD: 4.62 M/UL — SIGNIFICANT CHANGE UP (ref 3.8–5.2)
RBC # FLD: 16.7 % — HIGH (ref 10.3–14.5)
SODIUM SERPL-SCNC: 141 MMOL/L — SIGNIFICANT CHANGE UP (ref 135–145)
SP GR SPEC: 1.01 — SIGNIFICANT CHANGE UP (ref 1.01–1.02)
TROPONIN I SERPL-MCNC: 0 NG/ML — LOW (ref 0.02–0.06)
TROPONIN I SERPL-MCNC: 0 NG/ML — LOW (ref 0.02–0.06)
UROBILINOGEN FLD QL: NEGATIVE MG/DL — SIGNIFICANT CHANGE UP
WBC # BLD: 10.1 K/UL — SIGNIFICANT CHANGE UP (ref 3.8–10.5)
WBC # FLD AUTO: 10.1 K/UL — SIGNIFICANT CHANGE UP (ref 3.8–10.5)

## 2018-01-28 PROCEDURE — 84484 ASSAY OF TROPONIN QUANT: CPT

## 2018-01-28 PROCEDURE — 85730 THROMBOPLASTIN TIME PARTIAL: CPT

## 2018-01-28 PROCEDURE — 80053 COMPREHEN METABOLIC PANEL: CPT

## 2018-01-28 PROCEDURE — 71275 CT ANGIOGRAPHY CHEST: CPT

## 2018-01-28 PROCEDURE — 71275 CT ANGIOGRAPHY CHEST: CPT | Mod: 26

## 2018-01-28 PROCEDURE — 85379 FIBRIN DEGRADATION QUANT: CPT

## 2018-01-28 PROCEDURE — 99284 EMERGENCY DEPT VISIT MOD MDM: CPT | Mod: 25

## 2018-01-28 PROCEDURE — 71046 X-RAY EXAM CHEST 2 VIEWS: CPT

## 2018-01-28 PROCEDURE — 71046 X-RAY EXAM CHEST 2 VIEWS: CPT | Mod: 26

## 2018-01-28 PROCEDURE — 93005 ELECTROCARDIOGRAM TRACING: CPT

## 2018-01-28 PROCEDURE — 93010 ELECTROCARDIOGRAM REPORT: CPT

## 2018-01-28 PROCEDURE — 85027 COMPLETE CBC AUTOMATED: CPT

## 2018-01-28 PROCEDURE — 99285 EMERGENCY DEPT VISIT HI MDM: CPT

## 2018-01-28 PROCEDURE — 82553 CREATINE MB FRACTION: CPT

## 2018-01-28 PROCEDURE — 85610 PROTHROMBIN TIME: CPT

## 2018-01-28 PROCEDURE — 81001 URINALYSIS AUTO W/SCOPE: CPT

## 2018-01-28 RX ORDER — LOSARTAN POTASSIUM 100 MG/1
1 TABLET, FILM COATED ORAL
Qty: 0 | Refills: 0 | COMMUNITY

## 2018-01-28 RX ORDER — POTASSIUM CHLORIDE 20 MEQ
20 PACKET (EA) ORAL ONCE
Qty: 0 | Refills: 0 | Status: COMPLETED | OUTPATIENT
Start: 2018-01-28 | End: 2018-01-28

## 2018-01-28 RX ADMIN — Medication 20 MILLIEQUIVALENT(S): at 20:22

## 2018-01-28 NOTE — ED ADULT NURSE REASSESSMENT NOTE - NS ED NURSE REASSESS COMMENT FT1
pt is stable at this time, pt denies any pain/distress. pt has family at the bedside. pt awaiting repeat CE labs.

## 2018-01-28 NOTE — ED PROVIDER NOTE - PROGRESS NOTE DETAILS
Pt examined by ED attending, Dr. Martin who agreed with disposition and plan. Pt examined by ED attending, Dr. Martin who agreed with disposition and plan.  Pt has had neg cardiac workup in 05/2017 with neg nuclear scan test, neg cards, ct angio chest and abdomen; will get 2 sets enzymes, ddimer, ekg, cxr, f/u cardiology.

## 2018-01-28 NOTE — ED PROVIDER NOTE - ATTENDING CONTRIBUTION TO CARE
51 yo female with atypical chest pain today now resolved. Had full cardiac workup May 2017 for similar symptoms with negative nuclear stress test. PE neg today. Plan - EKG, CE, Ddimer. Likely noncardiac pain.    I performed a history and physical exam of the patient and discussed their management with the advanced care provider. I reviewed the advanced care provider's note and agree with the documented findings and plan of care. My medical decision making and objective findings are found above.

## 2018-01-28 NOTE — ED PROVIDER NOTE - OBJECTIVE STATEMENT
53 yo F with hx of HTN, HLD, asthma, anemia presents with c/o chest pain since 6pm today. Pt states that she started having diffuse chest pain radiating to L arm and leg and to her back, took aspirin 81mg and feels better since. States that chest pain has resolved but still feels mild mid back pain. Denies dizziness, n/v, fever, palpitations, syncope, numbness, tingling or other symptoms. 53 yo F with hx of HTN, HLD, asthma, anemia presents with c/o chest pain since 6pm today. Pt states that she started having diffuse chest pain radiating to L arm and leg and to her back, took aspirin 81mg and feels better since. States that chest pain has resolved but still feels mild mid back pain. Denies dizziness, n/v, fever, palpitations, syncope, numbness, tingling, smoking, ocp use, leg swelling/pain, recent travel/surgery or other symptoms.  Cardiologist: Dr. Seo  PMD: Dr. Kiko Morris

## 2018-01-28 NOTE — ED PROVIDER NOTE - MEDICAL DECISION MAKING DETAILS
51 yo f with hx of htn, hld, asthma here with chest pain since 6pm today, feels better now after taking aspirin 81mg, pt had full cardiac workup with neg nuclear scan test, ct angio chest & abdomen, cards in 05/2017 which were neg; will get 2 sets of enzymes, ekg, cxr, d-dimer, re-assess

## 2018-06-22 ENCOUNTER — EMERGENCY (EMERGENCY)
Facility: HOSPITAL | Age: 53
LOS: 1 days | Discharge: HOME | End: 2018-06-22
Attending: EMERGENCY MEDICINE | Admitting: EMERGENCY MEDICINE
Payer: MEDICAID

## 2018-06-22 VITALS
DIASTOLIC BLOOD PRESSURE: 54 MMHG | WEIGHT: 285.06 LBS | OXYGEN SATURATION: 98 % | HEART RATE: 92 BPM | HEIGHT: 63 IN | SYSTOLIC BLOOD PRESSURE: 105 MMHG | RESPIRATION RATE: 18 BRPM | TEMPERATURE: 98 F

## 2018-06-22 LAB
ANION GAP SERPL CALC-SCNC: 9 MMOL/L — SIGNIFICANT CHANGE UP (ref 5–17)
APTT BLD: 28.5 SEC — SIGNIFICANT CHANGE UP (ref 27.5–37.4)
BASOPHILS # BLD AUTO: 0.1 K/UL — SIGNIFICANT CHANGE UP (ref 0–0.2)
BASOPHILS NFR BLD AUTO: 0.9 % — SIGNIFICANT CHANGE UP (ref 0–2)
BUN SERPL-MCNC: 21 MG/DL — SIGNIFICANT CHANGE UP (ref 7–23)
CALCIUM SERPL-MCNC: 9.3 MG/DL — SIGNIFICANT CHANGE UP (ref 8.4–10.5)
CHLORIDE SERPL-SCNC: 103 MMOL/L — SIGNIFICANT CHANGE UP (ref 96–108)
CO2 SERPL-SCNC: 28 MMOL/L — SIGNIFICANT CHANGE UP (ref 22–31)
CREAT SERPL-MCNC: 0.81 MG/DL — SIGNIFICANT CHANGE UP (ref 0.5–1.3)
EOSINOPHIL # BLD AUTO: 0.1 K/UL — SIGNIFICANT CHANGE UP (ref 0–0.5)
EOSINOPHIL NFR BLD AUTO: 1.1 % — SIGNIFICANT CHANGE UP (ref 0–6)
GLUCOSE SERPL-MCNC: 161 MG/DL — HIGH (ref 70–99)
HCT VFR BLD CALC: 36.4 % — SIGNIFICANT CHANGE UP (ref 34.5–45)
HGB BLD-MCNC: 11.1 G/DL — LOW (ref 11.5–15.5)
INR BLD: 1.01 RATIO — SIGNIFICANT CHANGE UP (ref 0.88–1.16)
LYMPHOCYTES # BLD AUTO: 27.5 % — SIGNIFICANT CHANGE UP (ref 13–44)
LYMPHOCYTES # BLD AUTO: 3 K/UL — SIGNIFICANT CHANGE UP (ref 1–3.3)
MCHC RBC-ENTMCNC: 21.7 PG — LOW (ref 27–34)
MCHC RBC-ENTMCNC: 30.5 GM/DL — LOW (ref 32–36)
MCV RBC AUTO: 71.2 FL — LOW (ref 80–100)
MONOCYTES # BLD AUTO: 0.7 K/UL — SIGNIFICANT CHANGE UP (ref 0–0.9)
MONOCYTES NFR BLD AUTO: 6.6 % — SIGNIFICANT CHANGE UP (ref 2–14)
NEUTROPHILS # BLD AUTO: 7 K/UL — SIGNIFICANT CHANGE UP (ref 1.8–7.4)
NEUTROPHILS NFR BLD AUTO: 63.9 % — SIGNIFICANT CHANGE UP (ref 43–77)
PLATELET # BLD AUTO: 273 K/UL — SIGNIFICANT CHANGE UP (ref 150–400)
POTASSIUM SERPL-MCNC: 2.9 MMOL/L — CRITICAL LOW (ref 3.5–5.3)
POTASSIUM SERPL-SCNC: 2.9 MMOL/L — CRITICAL LOW (ref 3.5–5.3)
PROTHROM AB SERPL-ACNC: 11 SEC — SIGNIFICANT CHANGE UP (ref 9.8–12.7)
RBC # BLD: 5.11 M/UL — SIGNIFICANT CHANGE UP (ref 3.8–5.2)
RBC # FLD: 17.5 % — HIGH (ref 10.3–14.5)
SODIUM SERPL-SCNC: 140 MMOL/L — SIGNIFICANT CHANGE UP (ref 135–145)
WBC # BLD: 10.9 K/UL — HIGH (ref 3.8–10.5)
WBC # FLD AUTO: 10.9 K/UL — HIGH (ref 3.8–10.5)

## 2018-06-22 PROCEDURE — 99285 EMERGENCY DEPT VISIT HI MDM: CPT

## 2018-06-22 PROCEDURE — 71045 X-RAY EXAM CHEST 1 VIEW: CPT | Mod: 26

## 2018-06-22 PROCEDURE — 93010 ELECTROCARDIOGRAM REPORT: CPT

## 2018-06-22 RX ORDER — IOHEXOL 300 MG/ML
30 INJECTION, SOLUTION INTRAVENOUS ONCE
Qty: 0 | Refills: 0 | Status: COMPLETED | OUTPATIENT
Start: 2018-06-22 | End: 2018-06-22

## 2018-06-22 RX ORDER — SODIUM CHLORIDE 9 MG/ML
3 INJECTION INTRAMUSCULAR; INTRAVENOUS; SUBCUTANEOUS ONCE
Qty: 0 | Refills: 0 | Status: COMPLETED | OUTPATIENT
Start: 2018-06-22 | End: 2018-06-22

## 2018-06-22 RX ADMIN — IOHEXOL 30 MILLILITER(S): 300 INJECTION, SOLUTION INTRAVENOUS at 23:13

## 2018-06-22 RX ADMIN — SODIUM CHLORIDE 3 MILLILITER(S): 9 INJECTION INTRAMUSCULAR; INTRAVENOUS; SUBCUTANEOUS at 23:01

## 2018-06-22 NOTE — ED PROVIDER NOTE - PROGRESS NOTE DETAILS
pt states pain is getting worse again, pain is right LQ radiates into groin and into anterior thigh, worse with palpation and movement, no back/si joint ttp

## 2018-06-22 NOTE — ED PROVIDER NOTE - CONSTITUTIONAL, MLM
Well appearing, obese, awake, alert, oriented to person, place, time/situation and in apparent distress. normal...

## 2018-06-22 NOTE — ED PROVIDER NOTE - OBJECTIVE STATEMENT
51 y/o F pt w/ PMHx HTN, asthma and PSHx cholecystectomy, herniorrhaphy,  section x 4 c/o RLQ abd pain x 3 days, worse today. Pt states the pain radiates to the R side of her back and pain is aggravated with movement. Pt denies fever, n/v/d, cp, sob or any other complaints at this time.

## 2018-06-22 NOTE — ED ADULT NURSE NOTE - PMH
Asthma    Benign Essential Hypertension    GERD (Gastroesophageal Reflux Disease)    Right-sided carotid artery disease

## 2018-06-23 VITALS
OXYGEN SATURATION: 99 % | SYSTOLIC BLOOD PRESSURE: 110 MMHG | RESPIRATION RATE: 16 BRPM | TEMPERATURE: 98 F | HEART RATE: 80 BPM | DIASTOLIC BLOOD PRESSURE: 60 MMHG

## 2018-06-23 LAB
ALBUMIN SERPL ELPH-MCNC: 3 G/DL — LOW (ref 3.3–5)
ALP SERPL-CCNC: 97 U/L — SIGNIFICANT CHANGE UP (ref 30–120)
ALT FLD-CCNC: 24 U/L DA — SIGNIFICANT CHANGE UP (ref 10–60)
ANION GAP SERPL CALC-SCNC: 11 MMOL/L — SIGNIFICANT CHANGE UP (ref 5–17)
ANISOCYTOSIS BLD QL: SLIGHT — SIGNIFICANT CHANGE UP
APPEARANCE UR: CLEAR — SIGNIFICANT CHANGE UP
AST SERPL-CCNC: 21 U/L — SIGNIFICANT CHANGE UP (ref 10–40)
BACTERIA # UR AUTO: ABNORMAL
BILIRUB SERPL-MCNC: 0.4 MG/DL — SIGNIFICANT CHANGE UP (ref 0.2–1.2)
BILIRUB UR-MCNC: NEGATIVE — SIGNIFICANT CHANGE UP
BLD GP AB SCN SERPL QL: SIGNIFICANT CHANGE UP
BUN SERPL-MCNC: 20 MG/DL — SIGNIFICANT CHANGE UP (ref 7–23)
CALCIUM SERPL-MCNC: 9.3 MG/DL — SIGNIFICANT CHANGE UP (ref 8.4–10.5)
CHLORIDE SERPL-SCNC: 103 MMOL/L — SIGNIFICANT CHANGE UP (ref 96–108)
CK MB BLD-MCNC: <0.5 % — SIGNIFICANT CHANGE UP (ref 0–3.5)
CK MB CFR SERPL CALC: <0.3 NG/ML — SIGNIFICANT CHANGE UP (ref 0–3.6)
CK SERPL-CCNC: 62 U/L — SIGNIFICANT CHANGE UP (ref 26–192)
CO2 SERPL-SCNC: 26 MMOL/L — SIGNIFICANT CHANGE UP (ref 22–31)
COLOR SPEC: YELLOW — SIGNIFICANT CHANGE UP
CREAT SERPL-MCNC: 0.81 MG/DL — SIGNIFICANT CHANGE UP (ref 0.5–1.3)
DIFF PNL FLD: NEGATIVE — SIGNIFICANT CHANGE UP
ELLIPTOCYTES BLD QL SMEAR: SLIGHT — SIGNIFICANT CHANGE UP
EPI CELLS # UR: SIGNIFICANT CHANGE UP
GLUCOSE SERPL-MCNC: 160 MG/DL — HIGH (ref 70–99)
GLUCOSE UR QL: NEGATIVE MG/DL — SIGNIFICANT CHANGE UP
HCG SERPL-ACNC: 1 MIU/ML — SIGNIFICANT CHANGE UP
HYPOCHROMIA BLD QL: SLIGHT — SIGNIFICANT CHANGE UP
KETONES UR-MCNC: NEGATIVE — SIGNIFICANT CHANGE UP
LEUKOCYTE ESTERASE UR-ACNC: ABNORMAL
LIDOCAIN IGE QN: 132 U/L — SIGNIFICANT CHANGE UP (ref 73–393)
MACROCYTES BLD QL: SLIGHT — SIGNIFICANT CHANGE UP
MANUAL DIF COMMENT BLD-IMP: SIGNIFICANT CHANGE UP
MICROCYTES BLD QL: SLIGHT — SIGNIFICANT CHANGE UP
NITRITE UR-MCNC: NEGATIVE — SIGNIFICANT CHANGE UP
PH UR: 7 — SIGNIFICANT CHANGE UP (ref 5–8)
PLAT MORPH BLD: NORMAL — SIGNIFICANT CHANGE UP
POIKILOCYTOSIS BLD QL AUTO: SLIGHT — SIGNIFICANT CHANGE UP
POLYCHROMASIA BLD QL SMEAR: SLIGHT — SIGNIFICANT CHANGE UP
POTASSIUM SERPL-MCNC: 3 MMOL/L — LOW (ref 3.5–5.3)
POTASSIUM SERPL-SCNC: 3 MMOL/L — LOW (ref 3.5–5.3)
PROT SERPL-MCNC: 7.4 G/DL — SIGNIFICANT CHANGE UP (ref 6–8.3)
PROT UR-MCNC: NEGATIVE MG/DL — SIGNIFICANT CHANGE UP
RBC BLD AUTO: ABNORMAL
RBC CASTS # UR COMP ASSIST: ABNORMAL /HPF (ref 0–4)
SODIUM SERPL-SCNC: 140 MMOL/L — SIGNIFICANT CHANGE UP (ref 135–145)
SP GR SPEC: 1.01 — SIGNIFICANT CHANGE UP (ref 1.01–1.02)
TARGETS BLD QL SMEAR: SLIGHT — SIGNIFICANT CHANGE UP
TROPONIN I SERPL-MCNC: 0 NG/ML — LOW (ref 0.02–0.06)
UROBILINOGEN FLD QL: NEGATIVE MG/DL — SIGNIFICANT CHANGE UP
WBC UR QL: ABNORMAL

## 2018-06-23 PROCEDURE — 96375 TX/PRO/DX INJ NEW DRUG ADDON: CPT

## 2018-06-23 PROCEDURE — 80053 COMPREHEN METABOLIC PANEL: CPT

## 2018-06-23 PROCEDURE — 74177 CT ABD & PELVIS W/CONTRAST: CPT | Mod: 26

## 2018-06-23 PROCEDURE — 82553 CREATINE MB FRACTION: CPT

## 2018-06-23 PROCEDURE — 36415 COLL VENOUS BLD VENIPUNCTURE: CPT

## 2018-06-23 PROCEDURE — 85610 PROTHROMBIN TIME: CPT

## 2018-06-23 PROCEDURE — 99284 EMERGENCY DEPT VISIT MOD MDM: CPT | Mod: 25

## 2018-06-23 PROCEDURE — 86900 BLOOD TYPING SEROLOGIC ABO: CPT

## 2018-06-23 PROCEDURE — 83690 ASSAY OF LIPASE: CPT

## 2018-06-23 PROCEDURE — 74177 CT ABD & PELVIS W/CONTRAST: CPT

## 2018-06-23 PROCEDURE — 85027 COMPLETE CBC AUTOMATED: CPT

## 2018-06-23 PROCEDURE — 82550 ASSAY OF CK (CPK): CPT

## 2018-06-23 PROCEDURE — 84484 ASSAY OF TROPONIN QUANT: CPT

## 2018-06-23 PROCEDURE — 86901 BLOOD TYPING SEROLOGIC RH(D): CPT

## 2018-06-23 PROCEDURE — 71045 X-RAY EXAM CHEST 1 VIEW: CPT

## 2018-06-23 PROCEDURE — 84702 CHORIONIC GONADOTROPIN TEST: CPT

## 2018-06-23 PROCEDURE — 85730 THROMBOPLASTIN TIME PARTIAL: CPT

## 2018-06-23 PROCEDURE — 96374 THER/PROPH/DIAG INJ IV PUSH: CPT | Mod: 59

## 2018-06-23 PROCEDURE — 86850 RBC ANTIBODY SCREEN: CPT

## 2018-06-23 PROCEDURE — 93005 ELECTROCARDIOGRAM TRACING: CPT

## 2018-06-23 PROCEDURE — 80048 BASIC METABOLIC PNL TOTAL CA: CPT

## 2018-06-23 PROCEDURE — 81001 URINALYSIS AUTO W/SCOPE: CPT

## 2018-06-23 RX ORDER — MORPHINE SULFATE 50 MG/1
2 CAPSULE, EXTENDED RELEASE ORAL ONCE
Qty: 0 | Refills: 0 | Status: DISCONTINUED | OUTPATIENT
Start: 2018-06-23 | End: 2018-06-23

## 2018-06-23 RX ORDER — SODIUM CHLORIDE 9 MG/ML
1000 INJECTION INTRAMUSCULAR; INTRAVENOUS; SUBCUTANEOUS ONCE
Qty: 0 | Refills: 0 | Status: COMPLETED | OUTPATIENT
Start: 2018-06-23 | End: 2018-06-23

## 2018-06-23 RX ORDER — FAMOTIDINE 10 MG/ML
20 INJECTION INTRAVENOUS ONCE
Qty: 0 | Refills: 0 | Status: COMPLETED | OUTPATIENT
Start: 2018-06-23 | End: 2018-06-23

## 2018-06-23 RX ORDER — POTASSIUM CHLORIDE 20 MEQ
10 PACKET (EA) ORAL ONCE
Qty: 0 | Refills: 0 | Status: COMPLETED | OUTPATIENT
Start: 2018-06-23 | End: 2018-06-23

## 2018-06-23 RX ORDER — KETOROLAC TROMETHAMINE 30 MG/ML
30 SYRINGE (ML) INJECTION ONCE
Qty: 0 | Refills: 0 | Status: DISCONTINUED | OUTPATIENT
Start: 2018-06-23 | End: 2018-06-23

## 2018-06-23 RX ADMIN — FAMOTIDINE 20 MILLIGRAM(S): 10 INJECTION INTRAVENOUS at 02:21

## 2018-06-23 RX ADMIN — Medication 30 MILLIGRAM(S): at 02:21

## 2018-06-23 RX ADMIN — Medication 100 MILLIEQUIVALENT(S): at 00:39

## 2018-06-23 RX ADMIN — MORPHINE SULFATE 2 MILLIGRAM(S): 50 CAPSULE, EXTENDED RELEASE ORAL at 00:46

## 2018-06-23 RX ADMIN — MORPHINE SULFATE 2 MILLIGRAM(S): 50 CAPSULE, EXTENDED RELEASE ORAL at 00:39

## 2018-06-23 RX ADMIN — SODIUM CHLORIDE 1000 MILLILITER(S): 9 INJECTION INTRAMUSCULAR; INTRAVENOUS; SUBCUTANEOUS at 00:39

## 2018-08-08 NOTE — ED ADULT TRIAGE NOTE - NS ED TRIAGE AVPU SCALE
The patient is a 79y Male complaining of flank pain.
Alert-The patient is alert, awake and responds to voice. The patient is oriented to time, place, and person. The triage nurse is able to obtain subjective information.

## 2019-01-18 ENCOUNTER — EMERGENCY (EMERGENCY)
Facility: HOSPITAL | Age: 54
LOS: 1 days | Discharge: HOME | End: 2019-01-18
Attending: EMERGENCY MEDICINE | Admitting: EMERGENCY MEDICINE
Payer: COMMERCIAL

## 2019-01-18 VITALS
SYSTOLIC BLOOD PRESSURE: 132 MMHG | RESPIRATION RATE: 16 BRPM | TEMPERATURE: 98 F | DIASTOLIC BLOOD PRESSURE: 70 MMHG | OXYGEN SATURATION: 99 % | HEART RATE: 74 BPM

## 2019-01-18 VITALS
HEART RATE: 86 BPM | HEIGHT: 64 IN | WEIGHT: 279.99 LBS | SYSTOLIC BLOOD PRESSURE: 137 MMHG | OXYGEN SATURATION: 99 % | RESPIRATION RATE: 14 BRPM | DIASTOLIC BLOOD PRESSURE: 87 MMHG | TEMPERATURE: 98 F

## 2019-01-18 PROBLEM — I77.9 DISORDER OF ARTERIES AND ARTERIOLES, UNSPECIFIED: Chronic | Status: ACTIVE | Noted: 2018-06-22

## 2019-01-18 LAB
ALBUMIN SERPL ELPH-MCNC: 3 G/DL — LOW (ref 3.3–5)
ALP SERPL-CCNC: 94 U/L — SIGNIFICANT CHANGE UP (ref 30–120)
ALT FLD-CCNC: 32 U/L DA — SIGNIFICANT CHANGE UP (ref 10–60)
ANION GAP SERPL CALC-SCNC: 9 MMOL/L — SIGNIFICANT CHANGE UP (ref 5–17)
APPEARANCE UR: CLEAR — SIGNIFICANT CHANGE UP
AST SERPL-CCNC: 21 U/L — SIGNIFICANT CHANGE UP (ref 10–40)
BASOPHILS # BLD AUTO: 0.02 K/UL — SIGNIFICANT CHANGE UP (ref 0–0.2)
BASOPHILS NFR BLD AUTO: 0.3 % — SIGNIFICANT CHANGE UP (ref 0–2)
BILIRUB SERPL-MCNC: 0.5 MG/DL — SIGNIFICANT CHANGE UP (ref 0.2–1.2)
BILIRUB UR-MCNC: ABNORMAL
BUN SERPL-MCNC: 15 MG/DL — SIGNIFICANT CHANGE UP (ref 7–23)
CALCIUM SERPL-MCNC: 9.2 MG/DL — SIGNIFICANT CHANGE UP (ref 8.4–10.5)
CHLORIDE SERPL-SCNC: 103 MMOL/L — SIGNIFICANT CHANGE UP (ref 96–108)
CO2 SERPL-SCNC: 28 MMOL/L — SIGNIFICANT CHANGE UP (ref 22–31)
COLOR SPEC: YELLOW — SIGNIFICANT CHANGE UP
CREAT SERPL-MCNC: 0.95 MG/DL — SIGNIFICANT CHANGE UP (ref 0.5–1.3)
DIFF PNL FLD: ABNORMAL
EOSINOPHIL # BLD AUTO: 0.04 K/UL — SIGNIFICANT CHANGE UP (ref 0–0.5)
EOSINOPHIL NFR BLD AUTO: 0.7 % — SIGNIFICANT CHANGE UP (ref 0–6)
GLUCOSE SERPL-MCNC: 107 MG/DL — HIGH (ref 70–99)
GLUCOSE UR QL: NEGATIVE MG/DL — SIGNIFICANT CHANGE UP
HCT VFR BLD CALC: 35.2 % — SIGNIFICANT CHANGE UP (ref 34.5–45)
HGB BLD-MCNC: 10.6 G/DL — LOW (ref 11.5–15.5)
IMM GRANULOCYTES NFR BLD AUTO: 0.3 % — SIGNIFICANT CHANGE UP (ref 0–1.5)
KETONES UR-MCNC: ABNORMAL
LEUKOCYTE ESTERASE UR-ACNC: ABNORMAL
LYMPHOCYTES # BLD AUTO: 2.36 K/UL — SIGNIFICANT CHANGE UP (ref 1–3.3)
LYMPHOCYTES # BLD AUTO: 39.9 % — SIGNIFICANT CHANGE UP (ref 13–44)
MCHC RBC-ENTMCNC: 22.6 PG — LOW (ref 27–34)
MCHC RBC-ENTMCNC: 30.1 GM/DL — LOW (ref 32–36)
MCV RBC AUTO: 75.2 FL — LOW (ref 80–100)
MONOCYTES # BLD AUTO: 0.47 K/UL — SIGNIFICANT CHANGE UP (ref 0–0.9)
MONOCYTES NFR BLD AUTO: 7.9 % — SIGNIFICANT CHANGE UP (ref 2–14)
NEUTROPHILS # BLD AUTO: 3.01 K/UL — SIGNIFICANT CHANGE UP (ref 1.8–7.4)
NEUTROPHILS NFR BLD AUTO: 50.9 % — SIGNIFICANT CHANGE UP (ref 43–77)
NITRITE UR-MCNC: NEGATIVE — SIGNIFICANT CHANGE UP
PH UR: 5 — SIGNIFICANT CHANGE UP (ref 5–8)
PLATELET # BLD AUTO: 198 K/UL — SIGNIFICANT CHANGE UP (ref 150–400)
POTASSIUM SERPL-MCNC: 3.1 MMOL/L — LOW (ref 3.5–5.3)
POTASSIUM SERPL-SCNC: 3.1 MMOL/L — LOW (ref 3.5–5.3)
PROT SERPL-MCNC: 7.3 G/DL — SIGNIFICANT CHANGE UP (ref 6–8.3)
PROT UR-MCNC: 30 MG/DL
RBC # BLD: 4.68 M/UL — SIGNIFICANT CHANGE UP (ref 3.8–5.2)
RBC # FLD: 19.2 % — HIGH (ref 10.3–14.5)
SODIUM SERPL-SCNC: 140 MMOL/L — SIGNIFICANT CHANGE UP (ref 135–145)
SP GR SPEC: 1.02 — SIGNIFICANT CHANGE UP (ref 1.01–1.02)
UROBILINOGEN FLD QL: NEGATIVE MG/DL — SIGNIFICANT CHANGE UP
WBC # BLD: 5.92 K/UL — SIGNIFICANT CHANGE UP (ref 3.8–10.5)
WBC # FLD AUTO: 5.92 K/UL — SIGNIFICANT CHANGE UP (ref 3.8–10.5)

## 2019-01-18 PROCEDURE — 72110 X-RAY EXAM L-2 SPINE 4/>VWS: CPT

## 2019-01-18 PROCEDURE — 73502 X-RAY EXAM HIP UNI 2-3 VIEWS: CPT

## 2019-01-18 PROCEDURE — 81001 URINALYSIS AUTO W/SCOPE: CPT

## 2019-01-18 PROCEDURE — 76830 TRANSVAGINAL US NON-OB: CPT

## 2019-01-18 PROCEDURE — 99284 EMERGENCY DEPT VISIT MOD MDM: CPT | Mod: 25

## 2019-01-18 PROCEDURE — 85027 COMPLETE CBC AUTOMATED: CPT

## 2019-01-18 PROCEDURE — 96374 THER/PROPH/DIAG INJ IV PUSH: CPT

## 2019-01-18 PROCEDURE — 99285 EMERGENCY DEPT VISIT HI MDM: CPT

## 2019-01-18 PROCEDURE — 80053 COMPREHEN METABOLIC PANEL: CPT

## 2019-01-18 PROCEDURE — 72110 X-RAY EXAM L-2 SPINE 4/>VWS: CPT | Mod: 26

## 2019-01-18 PROCEDURE — 74177 CT ABD & PELVIS W/CONTRAST: CPT

## 2019-01-18 PROCEDURE — 73502 X-RAY EXAM HIP UNI 2-3 VIEWS: CPT | Mod: 26,RT

## 2019-01-18 PROCEDURE — 74177 CT ABD & PELVIS W/CONTRAST: CPT | Mod: 26

## 2019-01-18 PROCEDURE — 36415 COLL VENOUS BLD VENIPUNCTURE: CPT

## 2019-01-18 PROCEDURE — 76830 TRANSVAGINAL US NON-OB: CPT | Mod: 26

## 2019-01-18 PROCEDURE — 87086 URINE CULTURE/COLONY COUNT: CPT

## 2019-01-18 RX ORDER — IBUPROFEN 200 MG
600 TABLET ORAL ONCE
Qty: 0 | Refills: 0 | Status: COMPLETED | OUTPATIENT
Start: 2019-01-18 | End: 2019-01-18

## 2019-01-18 RX ORDER — METHOCARBAMOL 500 MG/1
1500 TABLET, FILM COATED ORAL ONCE
Qty: 0 | Refills: 0 | Status: COMPLETED | OUTPATIENT
Start: 2019-01-18 | End: 2019-01-18

## 2019-01-18 RX ORDER — SODIUM CHLORIDE 9 MG/ML
1000 INJECTION INTRAMUSCULAR; INTRAVENOUS; SUBCUTANEOUS ONCE
Qty: 0 | Refills: 0 | Status: COMPLETED | OUTPATIENT
Start: 2019-01-18 | End: 2019-01-18

## 2019-01-18 RX ORDER — SODIUM CHLORIDE 9 MG/ML
3 INJECTION INTRAMUSCULAR; INTRAVENOUS; SUBCUTANEOUS ONCE
Qty: 0 | Refills: 0 | Status: COMPLETED | OUTPATIENT
Start: 2019-01-18 | End: 2019-01-18

## 2019-01-18 RX ORDER — NITROFURANTOIN MACROCRYSTAL 50 MG
1 CAPSULE ORAL
Qty: 20 | Refills: 0
Start: 2019-01-18 | End: 2019-01-27

## 2019-01-18 RX ORDER — KETOROLAC TROMETHAMINE 30 MG/ML
30 SYRINGE (ML) INJECTION ONCE
Qty: 0 | Refills: 0 | Status: DISCONTINUED | OUTPATIENT
Start: 2019-01-18 | End: 2019-01-18

## 2019-01-18 RX ORDER — POTASSIUM CHLORIDE 20 MEQ
40 PACKET (EA) ORAL ONCE
Qty: 0 | Refills: 0 | Status: COMPLETED | OUTPATIENT
Start: 2019-01-18 | End: 2019-01-18

## 2019-01-18 RX ADMIN — METHOCARBAMOL 1500 MILLIGRAM(S): 500 TABLET, FILM COATED ORAL at 14:42

## 2019-01-18 RX ADMIN — Medication 30 MILLIGRAM(S): at 16:56

## 2019-01-18 RX ADMIN — SODIUM CHLORIDE 1000 MILLILITER(S): 9 INJECTION INTRAMUSCULAR; INTRAVENOUS; SUBCUTANEOUS at 16:00

## 2019-01-18 RX ADMIN — SODIUM CHLORIDE 3 MILLILITER(S): 9 INJECTION INTRAMUSCULAR; INTRAVENOUS; SUBCUTANEOUS at 14:42

## 2019-01-18 RX ADMIN — SODIUM CHLORIDE 1000 MILLILITER(S): 9 INJECTION INTRAMUSCULAR; INTRAVENOUS; SUBCUTANEOUS at 14:42

## 2019-01-18 RX ADMIN — Medication 40 MILLIEQUIVALENT(S): at 17:12

## 2019-01-18 RX ADMIN — Medication 30 MILLIGRAM(S): at 17:07

## 2019-01-18 RX ADMIN — Medication 600 MILLIGRAM(S): at 13:50

## 2019-01-18 RX ADMIN — Medication 600 MILLIGRAM(S): at 14:15

## 2019-01-18 NOTE — ED PROVIDER NOTE - PHYSICAL EXAMINATION
Spine Exam:     Cervical: No erythema, ecchymosis, or visible deformity. No midline tenderness or step-off appreciated on palpation. No paravertebral tenderness. No muscle spasm. FROM. NEG NEXUS criteria.          Thoracic: No erythema, ecchymosis, or visible deformity. No midline tenderness or step-off appreciated on palpation. No paravertebral tenderness. No muscle spasm.           Lumbosacral: No erythema, ecchymosis, or visible deformity. No midline tenderness or step-off appreciated on palpation. No paravertebral tenderness. No muscle spasm. Spine Exam:     Cervical: No erythema, ecchymosis, or visible deformity. No midline tenderness or step-off appreciated on palpation. No paravertebral tenderness. No muscle spasm. FROM. NEG NEXUS criteria.          Thoracic: No erythema, ecchymosis, or visible deformity. No midline tenderness or step-off appreciated on palpation. No paravertebral tenderness. No muscle spasm.           Lumbosacral: No erythema, ecchymosis, or visible deformity. No midline tenderness or step-off appreciated on palpation. R paravertebral tenderness. No muscle spasm. Spine Exam:     Cervical: No erythema, ecchymosis, or visible deformity. No midline tenderness or step-off appreciated on palpation. No paravertebral tenderness. No muscle spasm. FROM. NEG NEXUS criteria.          Thoracic: No erythema, ecchymosis, or visible deformity. No midline tenderness or step-off appreciated on palpation. No paravertebral tenderness. No muscle spasm.           Lumbosacral: No erythema, ecchymosis, or visible deformity. No midline tenderness or step-off appreciated on palpation. R paravertebral tenderness. No muscle spasm.  R LE:+ R HIP TTP. FROM. SENSATION GROSSLY INTACT.

## 2019-01-18 NOTE — ED PROVIDER NOTE - OBJECTIVE STATEMENT
pt is a 54yo male with pmhx of htn, hld, c/o r groin pain x yesterday. pt is a 52yo male with pmhx of htn, hld, c/o r groin pain x yesterday. pt reports r groin pain radiating to back and r leg. pt reports pain is worse with movement. pt denies injury. pt did not take anything for pain. pt denies fever, cp, sob, dysuria. pt is a 54yo female with pmhx of htn, hld, c/o r groin pain x yesterday. pt reports r groin pain radiating to back and r leg. pt reports pain is worse with movement. pt denies injury. pt did not take anything for pain. pt denies fever, cp, sob, dysuria.

## 2019-01-18 NOTE — ED PROVIDER NOTE - NSFOLLOWUPINSTRUCTIONS_ED_ALL_ED_FT
1. FOLLOW UP WITH YOUR PRIMARY DOCTOR IN 24-48 HOURS.   2. FOLLOW UP WITH ALL SPECALIST DISCUSSED DURING YOUR VISIT.   3. TAKE ALL MEDICATIONS PRESCRIBED IN THE ER.   4. RETURN FOR WORSENING SYMPTOMS INCLUDING BUT NOT LIMITED TO FEVER, CHEST PAIN, OR TROUBLE BREATHING.  5. take macrobid as prescribed daily

## 2019-01-18 NOTE — ED PROVIDER NOTE - PROGRESS NOTE DETAILS
Attending Contribution to Care: 54 y/o F pt presents to the ED c/o RLQ abd pain since yesterday, worsening today with radiation to leg and back. Denies worsening pain with movement. States her pain is similar to when she was pregnant. Hx of cholestectomy. Plan: CT A/P to r/o appendicitis. No further complaints at this time. Attending Contribution to Care: 54 y/o F pt presents to the ED c/o RLQ abd pain since yesterday, worsening today with radiation to leg and back. Denies worsening pain with movement. States her pain is similar to when she was pregnant. Hx of cholecystectomy. No further complaints at this time. PE: RLQ tenderness. Plan: CT A/P to r/o appendicitis. us and xray reviewed. will do ct abd/ pelvis and labs pt with known liver cyst. will given macrobid for dysuria that pt denies previously. will give potassium. advised gi and ortho follow up. All imaging and labs reviewed. all results reviewed with pt including abnormal results. pt given a copy of results. pt advised to follow up with pmd regarding abnormal results. All questions answered and concerns addressed. pt verbalized understanding and agreement with plan and dx. pt advised on next step and when/where to follow up. pt advised on all take home and otc medications. pt advised to follow up with PMD. pt advised to return to ed for worsenng symptoms including fever, cp, sob. will dc.

## 2019-01-18 NOTE — ED PROVIDER NOTE - CARE PROVIDER_API CALL
Johnathon Carrero), Orthopaedic Surgery Orthopedic Surgery  [address] [place] [address]  Phone: [phone]  Fax: [phone]    Saeid Massey), Gastroenterology  [address] [place] [address]  Phone: [phone]  Fax: [phone]

## 2019-01-18 NOTE — ED PROVIDER NOTE - NONTENDER LOCATION
periumbilical/left costovertebral angle/left upper quadrant/right upper quadrant/left lower quadrant/right lower quadrant/umbilical/right costovertebral angle

## 2019-01-18 NOTE — ED PROVIDER NOTE - CARE PLAN
Principal Discharge DX:	Right lower quadrant abdominal pain Principal Discharge DX:	Right lower quadrant abdominal pain  Secondary Diagnosis:	UTI (urinary tract infection)

## 2019-01-18 NOTE — ED ADULT NURSE NOTE - NSIMPLEMENTINTERV_GEN_ALL_ED
Implemented All Universal Safety Interventions:  [place] to call system. Call bell, personal items and telephone within reach. Instruct patient to call for assistance. Room bathroom lighting operational. Non-slip footwear when patient is off stretcher. Physically safe environment: no spills, clutter or unnecessary equipment. Stretcher in lowest position, wheels locked, appropriate side rails in place.

## 2019-01-18 NOTE — ED ADULT TRIAGE NOTE - HEART RATE (BEATS/MIN)
Please call and let patient know that his BMP is normal    His kidneys as well as his potassium levels are tolerating the Lasix at 80 mg b.i.d.    Thanks  Aisf Bhatt, DO     88

## 2019-01-18 NOTE — ED ADULT NURSE NOTE - OBJECTIVE STATEMENT
patient has c/o right groin pain since yesterday, denies vaginal bleeding or discharge, tender to touch, pain travels to her right back, will continue to monitor.

## 2019-01-19 LAB
CULTURE RESULTS: SIGNIFICANT CHANGE UP
SPECIMEN SOURCE: SIGNIFICANT CHANGE UP

## 2019-05-23 ENCOUNTER — EMERGENCY (EMERGENCY)
Facility: HOSPITAL | Age: 54
LOS: 1 days | Discharge: HOME | End: 2019-05-23
Attending: EMERGENCY MEDICINE | Admitting: EMERGENCY MEDICINE
Payer: COMMERCIAL

## 2019-05-23 VITALS
OXYGEN SATURATION: 98 % | WEIGHT: 279.99 LBS | SYSTOLIC BLOOD PRESSURE: 136 MMHG | DIASTOLIC BLOOD PRESSURE: 80 MMHG | HEART RATE: 90 BPM | HEIGHT: 64 IN | TEMPERATURE: 99 F | RESPIRATION RATE: 18 BRPM

## 2019-05-23 VITALS
SYSTOLIC BLOOD PRESSURE: 140 MMHG | DIASTOLIC BLOOD PRESSURE: 83 MMHG | OXYGEN SATURATION: 97 % | RESPIRATION RATE: 16 BRPM | HEART RATE: 90 BPM

## 2019-05-23 PROCEDURE — 99284 EMERGENCY DEPT VISIT MOD MDM: CPT | Mod: 25

## 2019-05-23 PROCEDURE — 96372 THER/PROPH/DIAG INJ SC/IM: CPT

## 2019-05-23 PROCEDURE — 99284 EMERGENCY DEPT VISIT MOD MDM: CPT

## 2019-05-23 RX ORDER — CYCLOBENZAPRINE HYDROCHLORIDE 10 MG/1
1 TABLET, FILM COATED ORAL
Qty: 15 | Refills: 0
Start: 2019-05-23

## 2019-05-23 RX ORDER — KETOROLAC TROMETHAMINE 30 MG/ML
60 SYRINGE (ML) INJECTION ONCE
Refills: 0 | Status: DISCONTINUED | OUTPATIENT
Start: 2019-05-23 | End: 2019-05-23

## 2019-05-23 RX ORDER — IBUPROFEN 200 MG
1 TABLET ORAL
Qty: 30 | Refills: 0
Start: 2019-05-23 | End: 2019-06-16

## 2019-05-23 RX ORDER — CYCLOBENZAPRINE HYDROCHLORIDE 10 MG/1
10 TABLET, FILM COATED ORAL ONCE
Refills: 0 | Status: COMPLETED | OUTPATIENT
Start: 2019-05-23 | End: 2019-05-23

## 2019-05-23 RX ADMIN — Medication 60 MILLIGRAM(S): at 20:01

## 2019-05-23 RX ADMIN — CYCLOBENZAPRINE HYDROCHLORIDE 10 MILLIGRAM(S): 10 TABLET, FILM COATED ORAL at 20:01

## 2019-05-23 NOTE — ED PROVIDER NOTE - PROGRESS NOTE DETAILS
feels much better, ready to go, will f/u with pcp about MRI, long discussion about weight loss, pt states will start tomorrow

## 2019-05-23 NOTE — ED ADULT NURSE NOTE - NSIMPLEMENTINTERV_GEN_ALL_ED
Implemented All Fall with Harm Risk Interventions:  [place] to call system. Call bell, personal items and telephone within reach. Instruct patient to call for assistance. Room bathroom lighting operational. Non-slip footwear when patient is off stretcher. Physically safe environment: no spills, clutter or unnecessary equipment. Stretcher in lowest position, wheels locked, appropriate side rails in place. Provide visual cue, wrist band, yellow gown, etc. Monitor gait and stability. Monitor for mental status changes and reorient to person, place, and time. Review medications for side effects contributing to fall risk. Reinforce activity limits and safety measures with patient and family. Provide visual clues: red socks.

## 2019-05-23 NOTE — ED ADULT NURSE NOTE - OBJECTIVE STATEMENT
pt reports she was walking up the stairs and started to have pain from her right groin around to her back. pt also reports having pain for 2 months. pt reports she had a similar episode about 4 - 6 months ago and was told everything was "alright"

## 2019-05-23 NOTE — ED PROVIDER NOTE - OBJECTIVE STATEMENT
54 y/o F pt w/ PMHx Asthma, Benign Essential Hypertension, HLD, GERD (Gastroesophageal Reflux Disease), Right-sided carotid artery disease, mitral valve prolapse, herniated discs presents to the ED c/o intermittent R lower back pain, R groin pain and R leg x 2 months, worse x today. Pt states that when she feels the pain she feels very hot and feels like she is unable to move her legs because of the pain. Endorsing worst pain today after taking the train and walking multiple blocks in the city. Has not been taking pain medication because she states she is taking multiple medications already and is afraid it will mess with her current medications, endorses she was given motrin by her doctor in the past which improved her pain. Reports she has a hard time showering, putting on shoes, taking care of her children because of the pain. Endorses she had an MRI for back completed 2-3 weeks ago, has not received the results, advises she has multiple herniated discs in back and has received steroid shots in the past. Pt denies CP, SOB, numbness/tingling, bladder or bowel dysfunction or any other complaints at this time.      PMD Dr Morris

## 2019-05-23 NOTE — ED PROVIDER NOTE - CLINICAL SUMMARY MEDICAL DECISION MAKING FREE TEXT BOX
morbidly obese female with worsening right sided back/groin/leg pain - c/w sciatica - nsaids, muscle relaxant, steroids, reassess - also had discussion about weight loss as morbid obesity likely a significant factor in back pain and pt's doctor recommending back surgery, pt feeling motivated to start trying to lose weight tomorrow

## 2019-05-23 NOTE — ED ADULT NURSE REASSESSMENT NOTE - NS ED NURSE REASSESS COMMENT FT1
seen and examined earlier by dr amezquita. medicated as ordered. pt reports feeling better. looks comfortable. able to ambulate unassisted in NAD. d/c to spouse

## 2019-05-23 NOTE — ED PROVIDER NOTE - PSH
DATE:  09/04/2018



CARDIOLOGY PROGRESS NOTE



SUBJECTIVE:  The patient still is weak and short of breath with activity,

but is improving steadily.  No chest pain noted.  Myocardial perfusion

scan today revealed normal ejection fraction with possibility of inferior

abnormality, but more likely artifactual as there was no associated wall

motion abnormality.



OBJECTIVE:

VITAL SIGNS:  Blood pressure 153/82, pulse 77, respiratory rate 22, and

afebrile.

LUNGS:  Coarse breath sounds.  No wheezing or rales.

HEART:  Regular rhythm and rate.  Normal S1 and S2 with a fourth heart

sound.

ABDOMEN:  Soft.

EXTREMITIES:  No edema.



IMPRESSION:  Overall improved.



PLAN:

1. Medical therapy.

2. No plan for cardiac catheterization presently.

3. We will discuss placement of AV fistula.

4. We will defer a dialysis at this time since no urgency until AV

fistula is mature.

5. We will need to address disposition with the patient and family

members as it is unclear at this time if he can return home

independently.









  ______________________________________________

  Edmar Munguia M.D.





DR:  ALEXSANDRA

D:  09/05/2018 00:53

T:  09/05/2018 06:01

JOB#:  [id number]

CC: Bilateral Inguinal Hernia (BIH)    C Section

## 2019-05-23 NOTE — ED PROVIDER NOTE - MUSCULOSKELETAL, MLM
No spinal tenderness. No bony tenderness no deformities. Pt appears in pain with turning, sitting, laying. + straight leg raise on R.

## 2019-09-20 ENCOUNTER — EMERGENCY (EMERGENCY)
Facility: HOSPITAL | Age: 54
LOS: 1 days | Discharge: HOME | End: 2019-09-20
Attending: EMERGENCY MEDICINE | Admitting: EMERGENCY MEDICINE
Payer: COMMERCIAL

## 2019-09-20 VITALS
WEIGHT: 285.06 LBS | SYSTOLIC BLOOD PRESSURE: 133 MMHG | OXYGEN SATURATION: 98 % | HEIGHT: 64 IN | HEART RATE: 97 BPM | RESPIRATION RATE: 20 BRPM | TEMPERATURE: 98 F | DIASTOLIC BLOOD PRESSURE: 85 MMHG

## 2019-09-20 VITALS
HEART RATE: 87 BPM | RESPIRATION RATE: 18 BRPM | OXYGEN SATURATION: 98 % | DIASTOLIC BLOOD PRESSURE: 79 MMHG | SYSTOLIC BLOOD PRESSURE: 115 MMHG | TEMPERATURE: 98 F

## 2019-09-20 LAB
ALBUMIN SERPL ELPH-MCNC: 3 G/DL — LOW (ref 3.3–5)
ALP SERPL-CCNC: 103 U/L — SIGNIFICANT CHANGE UP (ref 30–120)
ALT FLD-CCNC: 21 U/L DA — SIGNIFICANT CHANGE UP (ref 10–60)
ANION GAP SERPL CALC-SCNC: 7 MMOL/L — SIGNIFICANT CHANGE UP (ref 5–17)
APPEARANCE UR: CLEAR — SIGNIFICANT CHANGE UP
AST SERPL-CCNC: 11 U/L — SIGNIFICANT CHANGE UP (ref 10–40)
BASOPHILS # BLD AUTO: 0.03 K/UL — SIGNIFICANT CHANGE UP (ref 0–0.2)
BASOPHILS NFR BLD AUTO: 0.3 % — SIGNIFICANT CHANGE UP (ref 0–2)
BILIRUB SERPL-MCNC: 0.3 MG/DL — SIGNIFICANT CHANGE UP (ref 0.2–1.2)
BILIRUB UR-MCNC: NEGATIVE — SIGNIFICANT CHANGE UP
BUN SERPL-MCNC: 18 MG/DL — SIGNIFICANT CHANGE UP (ref 7–23)
CALCIUM SERPL-MCNC: 9.3 MG/DL — SIGNIFICANT CHANGE UP (ref 8.4–10.5)
CHLORIDE SERPL-SCNC: 103 MMOL/L — SIGNIFICANT CHANGE UP (ref 96–108)
CO2 SERPL-SCNC: 32 MMOL/L — HIGH (ref 22–31)
COLOR SPEC: YELLOW — SIGNIFICANT CHANGE UP
CREAT SERPL-MCNC: 0.95 MG/DL — SIGNIFICANT CHANGE UP (ref 0.5–1.3)
DIFF PNL FLD: NEGATIVE — SIGNIFICANT CHANGE UP
EOSINOPHIL # BLD AUTO: 0.09 K/UL — SIGNIFICANT CHANGE UP (ref 0–0.5)
EOSINOPHIL NFR BLD AUTO: 0.8 % — SIGNIFICANT CHANGE UP (ref 0–6)
GLUCOSE SERPL-MCNC: 141 MG/DL — HIGH (ref 70–99)
GLUCOSE UR QL: NEGATIVE MG/DL — SIGNIFICANT CHANGE UP
HCT VFR BLD CALC: 35.1 % — SIGNIFICANT CHANGE UP (ref 34.5–45)
HGB BLD-MCNC: 10.2 G/DL — LOW (ref 11.5–15.5)
IMM GRANULOCYTES NFR BLD AUTO: 0.3 % — SIGNIFICANT CHANGE UP (ref 0–1.5)
KETONES UR-MCNC: NEGATIVE — SIGNIFICANT CHANGE UP
LEUKOCYTE ESTERASE UR-ACNC: ABNORMAL
LIDOCAIN IGE QN: 97 U/L — SIGNIFICANT CHANGE UP (ref 73–393)
LYMPHOCYTES # BLD AUTO: 2.62 K/UL — SIGNIFICANT CHANGE UP (ref 1–3.3)
LYMPHOCYTES # BLD AUTO: 22.9 % — SIGNIFICANT CHANGE UP (ref 13–44)
MCHC RBC-ENTMCNC: 21.3 PG — LOW (ref 27–34)
MCHC RBC-ENTMCNC: 29.1 GM/DL — LOW (ref 32–36)
MCV RBC AUTO: 73.1 FL — LOW (ref 80–100)
MONOCYTES # BLD AUTO: 0.61 K/UL — SIGNIFICANT CHANGE UP (ref 0–0.9)
MONOCYTES NFR BLD AUTO: 5.3 % — SIGNIFICANT CHANGE UP (ref 2–14)
NEUTROPHILS # BLD AUTO: 8.04 K/UL — HIGH (ref 1.8–7.4)
NEUTROPHILS NFR BLD AUTO: 70.4 % — SIGNIFICANT CHANGE UP (ref 43–77)
NITRITE UR-MCNC: NEGATIVE — SIGNIFICANT CHANGE UP
NRBC # BLD: 0 /100 WBCS — SIGNIFICANT CHANGE UP (ref 0–0)
PH UR: 5 — SIGNIFICANT CHANGE UP (ref 5–8)
PLATELET # BLD AUTO: 307 K/UL — SIGNIFICANT CHANGE UP (ref 150–400)
POTASSIUM SERPL-MCNC: 3.6 MMOL/L — SIGNIFICANT CHANGE UP (ref 3.5–5.3)
POTASSIUM SERPL-SCNC: 3.6 MMOL/L — SIGNIFICANT CHANGE UP (ref 3.5–5.3)
PROT SERPL-MCNC: 7.4 G/DL — SIGNIFICANT CHANGE UP (ref 6–8.3)
PROT UR-MCNC: NEGATIVE MG/DL — SIGNIFICANT CHANGE UP
RBC # BLD: 4.8 M/UL — SIGNIFICANT CHANGE UP (ref 3.8–5.2)
RBC # FLD: 18.8 % — HIGH (ref 10.3–14.5)
SODIUM SERPL-SCNC: 142 MMOL/L — SIGNIFICANT CHANGE UP (ref 135–145)
SP GR SPEC: 1.02 — SIGNIFICANT CHANGE UP (ref 1.01–1.02)
UROBILINOGEN FLD QL: NEGATIVE MG/DL — SIGNIFICANT CHANGE UP
WBC # BLD: 11.43 K/UL — HIGH (ref 3.8–10.5)
WBC # FLD AUTO: 11.43 K/UL — HIGH (ref 3.8–10.5)

## 2019-09-20 PROCEDURE — 96361 HYDRATE IV INFUSION ADD-ON: CPT

## 2019-09-20 PROCEDURE — 99284 EMERGENCY DEPT VISIT MOD MDM: CPT

## 2019-09-20 PROCEDURE — 83690 ASSAY OF LIPASE: CPT

## 2019-09-20 PROCEDURE — 36415 COLL VENOUS BLD VENIPUNCTURE: CPT

## 2019-09-20 PROCEDURE — 76830 TRANSVAGINAL US NON-OB: CPT

## 2019-09-20 PROCEDURE — 87086 URINE CULTURE/COLONY COUNT: CPT

## 2019-09-20 PROCEDURE — 81001 URINALYSIS AUTO W/SCOPE: CPT

## 2019-09-20 PROCEDURE — 99284 EMERGENCY DEPT VISIT MOD MDM: CPT | Mod: 25

## 2019-09-20 PROCEDURE — 85027 COMPLETE CBC AUTOMATED: CPT

## 2019-09-20 PROCEDURE — 96374 THER/PROPH/DIAG INJ IV PUSH: CPT | Mod: 59

## 2019-09-20 PROCEDURE — 74177 CT ABD & PELVIS W/CONTRAST: CPT

## 2019-09-20 PROCEDURE — 80053 COMPREHEN METABOLIC PANEL: CPT

## 2019-09-20 PROCEDURE — 74177 CT ABD & PELVIS W/CONTRAST: CPT | Mod: 26

## 2019-09-20 PROCEDURE — 76830 TRANSVAGINAL US NON-OB: CPT | Mod: 26

## 2019-09-20 RX ORDER — CEFUROXIME AXETIL 250 MG
1 TABLET ORAL
Qty: 14 | Refills: 0
Start: 2019-09-20 | End: 2019-09-26

## 2019-09-20 RX ORDER — OXYCODONE HYDROCHLORIDE 5 MG/1
1 TABLET ORAL
Qty: 12 | Refills: 0
Start: 2019-09-20 | End: 2019-09-21

## 2019-09-20 RX ORDER — CEFUROXIME AXETIL 250 MG
500 TABLET ORAL
Refills: 0 | Status: DISCONTINUED | OUTPATIENT
Start: 2019-09-20 | End: 2019-10-04

## 2019-09-20 RX ORDER — SODIUM CHLORIDE 9 MG/ML
1000 INJECTION INTRAMUSCULAR; INTRAVENOUS; SUBCUTANEOUS ONCE
Refills: 0 | Status: COMPLETED | OUTPATIENT
Start: 2019-09-20 | End: 2019-09-20

## 2019-09-20 RX ORDER — KETOROLAC TROMETHAMINE 30 MG/ML
30 SYRINGE (ML) INJECTION ONCE
Refills: 0 | Status: DISCONTINUED | OUTPATIENT
Start: 2019-09-20 | End: 2019-09-20

## 2019-09-20 RX ADMIN — SODIUM CHLORIDE 1000 MILLILITER(S): 9 INJECTION INTRAMUSCULAR; INTRAVENOUS; SUBCUTANEOUS at 20:44

## 2019-09-20 RX ADMIN — Medication 30 MILLIGRAM(S): at 21:44

## 2019-09-20 RX ADMIN — Medication 30 MILLIGRAM(S): at 20:45

## 2019-09-20 RX ADMIN — Medication 500 MILLIGRAM(S): at 23:47

## 2019-09-20 RX ADMIN — SODIUM CHLORIDE 1000 MILLILITER(S): 9 INJECTION INTRAMUSCULAR; INTRAVENOUS; SUBCUTANEOUS at 22:00

## 2019-09-20 NOTE — ED PROVIDER NOTE - OBJECTIVE STATEMENT
52 y/o female with PMHx HTN, Asthma presents today c/o right sided groin/thigh/abdominal pain x 1 day. pt notes symptoms have progressively worsening since this morning and currently 10/10. notes hx of similar symptoms previous visit in which injection in the buttock relieved her pain and she did not followup with ortho. pt notes burning sensation radiating down the thigh. pt notes hx of multiple herniated disc. pt denies CP, SOB, hematuria, dysuria, frequency, N/V, abdominal masses, urine/bowel incontinence, fall/trauma, saddle anesthesia, or any other complaints.

## 2019-09-20 NOTE — ED PROVIDER NOTE - CLINICAL SUMMARY MEDICAL DECISION MAKING FREE TEXT BOX
c/o right tito/hip/abdominal pain x 1 day. Exam noted (+) limited right hip flexion, on palpable masses, (+) soft tissue TTP vs CVA. abd soft nt. Plan includes labs, CT abd/pelvic, US transvaginal, toradol, re-assess.

## 2019-09-20 NOTE — ED PROVIDER NOTE - PATIENT PORTAL LINK FT
You can access the FollowMyHealth Patient Portal offered by [facility] by registering at the following website: [email or web address]. By joining [employer]’s FollowMyHealth portal, you will also be able to view your health information using other applications (apps) compatible with our system.

## 2019-09-20 NOTE — ED PROVIDER NOTE - MUSCULOSKELETAL, MLM
Spine appears normal, range of motion is not limited, (+) right soft tissue TTP, (+) limited right hip flexion, (+) straight leg raise, 5/5 motor function, sensation to light touch intact b/l.

## 2019-09-20 NOTE — ED ADULT TRIAGE NOTE - CHIEF COMPLAINT QUOTE
"I have severe pain to my left lower abdomen and groin and left low back and down my left thigh today."

## 2019-09-20 NOTE — ED PROVIDER NOTE - PROGRESS NOTE DETAILS
Justin SHAH for ED attending, Dr. Montiel : 54 y/o female with a PMHx of HTN, asthma presents to the ED c/o sudden onset right lower abd/groin pain radiating to back sometimes feels like a burning today. States back pain now resolved.  PE: wd, obese, NAD, abd soft, nontender, RLE strength 5/5, lower leg and groin pain on rom, no palpable back pain, tenderness over proximal thigh and along inguinal ligament. My shift has ended, pending ct and US. case to be signed out. pt with limited us with no flow able to be determined to either side. pt with no abd pain and no rlq ttp above inguinal ligament.  hx not c/w torsion, will d/c and pt to fu with her ob/gyn for exophitic adnexal lesion

## 2019-09-22 LAB
CULTURE RESULTS: SIGNIFICANT CHANGE UP
SPECIMEN SOURCE: SIGNIFICANT CHANGE UP

## 2019-10-09 NOTE — ED PROVIDER NOTE - NS ED MD DISPO DISCHARGE
Pt was in for her second Twinrix vaccination today. She  Tolerated the injection well and had no adverse reactions. Pt set up her next nurse visit for her final Twinrix vaccination.    Home

## 2019-12-17 NOTE — ED PROVIDER NOTE - HISTORY ATTESTATION, MLM
Chief Complaint   Patient presents with    Right Hand - Pain        Assessment:  Bilateral carpal tunnel syndrome-- status post left carpal tunnel       release-01/10/2019              Left ring and little trigger fingers   Right ring trigger finger       Plan:  These 3 trigger fingers are bothering her  quite a bit with locking and interference with her normal activities of daily living  She is left-hand dominant and the left handed ones are bother her even more  For that reason I believe that she deserves trigger finger release of the left ring and little fingers under local anesthesia with mild sedation only and then I will inject the right ring trigger finger also  This right side may also come to surgery but I cannot totally disable both hands by doing bilateral surgeries initially     I explained the operation, its risks, its alternatives, its postop course clearly and fully to the patient -she has a increased risk of medical complications because of her age alone as well as her other medical comorbidities  I will get appropriate preop medical clearance from her family physician and blood work prior to this surgery  I will schedule this to [place] [facility] [place]PI:   Patient is an 79-year-old left hand dominant female who presents today for consultation for bilateral numbness and tingling of the hands x4 months  She was previously seen in this practice  On today's presentation she reports numbness and tingling of the left hand, particularly the in the thumb, index, and middle fingers  She reports that she frequently drops things  She also has similar symptoms of the right hand but they are not as significant  She reports that she tries to avoid taking medications for pain because she is on medications for so many other things  She returns now on 12/18/2018    She did not get any significant relief from the cortisone injection that I gave her a month ago  She is still dropping objects and has pain and numbness  She has similar but less severe symptoms on the right side  Because of persistence of symptoms she underwent uneventful left carpal tunnel release under local anesthesia with sedation at [facility] on 01/10/2019  She returns for follow-up now on 01/23/2019  Her numbness is just about totally gone except in the tip of her thumb; she only has minimal pain over the incision  Patient returns today on 2/27/2019, she is now 7 weeks postop left carpal tunnel release  On today's presentation she reports that her pain is resolved  She continues to have weakness with lifting heavy things such as pots and pans  She also reports mild numbness in the distal phalanx of the left thumb  She denies any subsequent bruising, swelling, or mechanical symptoms of the thumb or wrist   She does report recent painful triggering of the left small finger  She admits that the triggering was present before, however it was not causing pain at that time  Patient returns on 12/17/2019 for evaluation of new issue  On today's presentation, she complains of trigger fingers of the right ring finger, left ring finger, and left small finger  She reports that the triggers of the left hand have been present for approximately 1 year, and the right ring finger has been symptomatic for the past 2 months  She reports catching of the fingers with flexion motions of the bilateral hands  She denies any associated bruising, swelling, numbness, or tingling  She reports mild-to-moderate pain, and that her symptoms are started in the morning, they calm down early in the day, and then get worse as the day progresses  Past medical history, family history,  social history, medication history, and allergies are reviewed     Past medical history is significant for vascular claudication, GERD, hyperlipidemia, hypertension, hyperthyroidism, peripheral neuropathy, among others that may be contributory to treatment and prognosis of current issue  Please see HPI for pertinent review of systems  All other systems reviewed are negative  Exam:     Ht 4' 9 5" (1 461 m)   Wt 55 8 kg (123 lb)   BMI 26 16 kg/m²     Patient presents with no obvious anatomical deformities  Skin is warm and dry to touch with no signs of erythema, ecchymosis, or infection  She exhibits reproducible triggering of the right ring finger, left ring finger, and left small finger on exam   She is tender to palpation over the flexor tendons of the aforementioned fingers local to the A1 pulley with palpable nodules  She also exhibits arthritic changes of multiple joints of the phalangeal side of the bilateral hands  2+ distal radial pulse with brisk capillary refill to the fingers  Sensation light touch intact distally        Studies Reviewed:  None      Procedures    Scribe Attestation    I,:   Ya Valenzuela am acting as a scribe while in the presence of the attending physician :        I,:   Julianne Gutierrez MD personally performed the services described in this documentation    as scribed in my presence : I have reviewed and confirmed nurses' notes...

## 2020-01-07 ENCOUNTER — INBOUND DOCUMENT (OUTPATIENT)
Age: 55
End: 2020-01-07

## 2020-01-07 ENCOUNTER — EMERGENCY (EMERGENCY)
Facility: HOSPITAL | Age: 55
LOS: 1 days | Discharge: HOME | End: 2020-01-07
Attending: EMERGENCY MEDICINE | Admitting: EMERGENCY MEDICINE
Payer: COMMERCIAL

## 2020-01-07 VITALS
SYSTOLIC BLOOD PRESSURE: 124 MMHG | DIASTOLIC BLOOD PRESSURE: 62 MMHG | RESPIRATION RATE: 15 BRPM | HEART RATE: 68 BPM | TEMPERATURE: 98 F | OXYGEN SATURATION: 99 %

## 2020-01-07 VITALS
WEIGHT: 283.07 LBS | HEART RATE: 96 BPM | SYSTOLIC BLOOD PRESSURE: 129 MMHG | HEIGHT: 63 IN | RESPIRATION RATE: 18 BRPM | DIASTOLIC BLOOD PRESSURE: 58 MMHG | OXYGEN SATURATION: 100 % | TEMPERATURE: 99 F

## 2020-01-07 LAB
ALBUMIN SERPL ELPH-MCNC: 3 G/DL — LOW (ref 3.3–5)
ALP SERPL-CCNC: 85 U/L — SIGNIFICANT CHANGE UP (ref 30–120)
ALT FLD-CCNC: 26 U/L DA — SIGNIFICANT CHANGE UP (ref 10–60)
ANION GAP SERPL CALC-SCNC: 6 MMOL/L — SIGNIFICANT CHANGE UP (ref 5–17)
AST SERPL-CCNC: 16 U/L — SIGNIFICANT CHANGE UP (ref 10–40)
BILIRUB SERPL-MCNC: 0.5 MG/DL — SIGNIFICANT CHANGE UP (ref 0.2–1.2)
BUN SERPL-MCNC: 15 MG/DL — SIGNIFICANT CHANGE UP (ref 7–23)
CALCIUM SERPL-MCNC: 8.7 MG/DL — SIGNIFICANT CHANGE UP (ref 8.4–10.5)
CHLORIDE SERPL-SCNC: 104 MMOL/L — SIGNIFICANT CHANGE UP (ref 96–108)
CO2 SERPL-SCNC: 30 MMOL/L — SIGNIFICANT CHANGE UP (ref 22–31)
CREAT SERPL-MCNC: 0.9 MG/DL — SIGNIFICANT CHANGE UP (ref 0.5–1.3)
GLUCOSE SERPL-MCNC: 158 MG/DL — HIGH (ref 70–99)
HCT VFR BLD CALC: 33.2 % — LOW (ref 34.5–45)
HGB BLD-MCNC: 9.7 G/DL — LOW (ref 11.5–15.5)
HIV 1 & 2 AB SERPL IA.RAPID: SIGNIFICANT CHANGE UP
MCHC RBC-ENTMCNC: 21.3 PG — LOW (ref 27–34)
MCHC RBC-ENTMCNC: 29.2 GM/DL — LOW (ref 32–36)
MCV RBC AUTO: 72.8 FL — LOW (ref 80–100)
NRBC # BLD: 0 /100 WBCS — SIGNIFICANT CHANGE UP (ref 0–0)
NT-PROBNP SERPL-SCNC: 23 PG/ML — SIGNIFICANT CHANGE UP (ref 0–125)
PLATELET # BLD AUTO: 256 K/UL — SIGNIFICANT CHANGE UP (ref 150–400)
POTASSIUM SERPL-MCNC: 3 MMOL/L — LOW (ref 3.5–5.3)
POTASSIUM SERPL-SCNC: 3 MMOL/L — LOW (ref 3.5–5.3)
PROT SERPL-MCNC: 6.9 G/DL — SIGNIFICANT CHANGE UP (ref 6–8.3)
RBC # BLD: 4.56 M/UL — SIGNIFICANT CHANGE UP (ref 3.8–5.2)
RBC # FLD: 18.9 % — HIGH (ref 10.3–14.5)
SODIUM SERPL-SCNC: 140 MMOL/L — SIGNIFICANT CHANGE UP (ref 135–145)
TROPONIN I SERPL-MCNC: 0 NG/ML — LOW (ref 0.02–0.06)
TROPONIN I SERPL-MCNC: 0 NG/ML — LOW (ref 0.02–0.06)
WBC # BLD: 8.3 K/UL — SIGNIFICANT CHANGE UP (ref 3.8–10.5)
WBC # FLD AUTO: 8.3 K/UL — SIGNIFICANT CHANGE UP (ref 3.8–10.5)

## 2020-01-07 PROCEDURE — 80053 COMPREHEN METABOLIC PANEL: CPT

## 2020-01-07 PROCEDURE — 93010 ELECTROCARDIOGRAM REPORT: CPT

## 2020-01-07 PROCEDURE — 94640 AIRWAY INHALATION TREATMENT: CPT

## 2020-01-07 PROCEDURE — 85027 COMPLETE CBC AUTOMATED: CPT

## 2020-01-07 PROCEDURE — 99284 EMERGENCY DEPT VISIT MOD MDM: CPT

## 2020-01-07 PROCEDURE — 71046 X-RAY EXAM CHEST 2 VIEWS: CPT | Mod: 26

## 2020-01-07 PROCEDURE — 93005 ELECTROCARDIOGRAM TRACING: CPT

## 2020-01-07 PROCEDURE — 99283 EMERGENCY DEPT VISIT LOW MDM: CPT | Mod: 25

## 2020-01-07 PROCEDURE — 84484 ASSAY OF TROPONIN QUANT: CPT

## 2020-01-07 PROCEDURE — 36415 COLL VENOUS BLD VENIPUNCTURE: CPT

## 2020-01-07 PROCEDURE — 83880 ASSAY OF NATRIURETIC PEPTIDE: CPT

## 2020-01-07 PROCEDURE — 71046 X-RAY EXAM CHEST 2 VIEWS: CPT

## 2020-01-07 PROCEDURE — 86703 HIV-1/HIV-2 1 RESULT ANTBDY: CPT

## 2020-01-07 RX ORDER — ALBUTEROL 90 UG/1
1 AEROSOL, METERED ORAL
Qty: 1 | Refills: 0
Start: 2020-01-07 | End: 2020-01-20

## 2020-01-07 RX ORDER — IPRATROPIUM/ALBUTEROL SULFATE 18-103MCG
3 AEROSOL WITH ADAPTER (GRAM) INHALATION ONCE
Refills: 0 | Status: COMPLETED | OUTPATIENT
Start: 2020-01-07 | End: 2020-01-07

## 2020-01-07 RX ORDER — POTASSIUM CHLORIDE 20 MEQ
40 PACKET (EA) ORAL ONCE
Refills: 0 | Status: COMPLETED | OUTPATIENT
Start: 2020-01-07 | End: 2020-01-07

## 2020-01-07 RX ORDER — ALBUTEROL 90 UG/1
2.5 AEROSOL, METERED ORAL ONCE
Refills: 0 | Status: COMPLETED | OUTPATIENT
Start: 2020-01-07 | End: 2020-01-07

## 2020-01-07 RX ORDER — BUDESONIDE, MICRONIZED 100 %
0.5 POWDER (GRAM) MISCELLANEOUS ONCE
Refills: 0 | Status: COMPLETED | OUTPATIENT
Start: 2020-01-07 | End: 2020-01-07

## 2020-01-07 RX ADMIN — Medication 3 MILLILITER(S): at 09:39

## 2020-01-07 RX ADMIN — Medication 40 MILLIEQUIVALENT(S): at 10:29

## 2020-01-07 RX ADMIN — ALBUTEROL 2.5 MILLIGRAM(S): 90 AEROSOL, METERED ORAL at 13:11

## 2020-01-07 RX ADMIN — Medication 0.5 MILLIGRAM(S): at 09:39

## 2020-01-07 NOTE — ED PROVIDER NOTE - NS_ ATTENDINGSCRIBEDETAILS _ED_A_ED_FT
Cj Cloud MD - The scribe's documentation has been prepared under my direction and personally reviewed by me in its entirety. I confirm that the note above accurately reflects all work, treatment, procedures, and medical decision making performed by me.

## 2020-01-07 NOTE — ED ADULT NURSE NOTE - OBJECTIVE STATEMENT
patient is from home, c/o chest pain patient is from home, c/o chest pain this morning and cough, has similar episode before but never follow up with PMD,  oxygen helped her after chest pain, skin is warm to touch and intact. morbid obese, will continue to monitor.

## 2020-01-07 NOTE — ED PROVIDER NOTE - OBJECTIVE STATEMENT
pt's son Christopher ad hoc .  Chest pain and sob while taking her daughter to the bus around 7am today.  Left cp c sob and coughing .  pt took asa and symbicort.  Similar episodes in the past, but were mild.   pmd and cardio in [place].

## 2020-01-07 NOTE — ED PROVIDER NOTE - PATIENT PORTAL LINK FT
You can access the FollowMyHealth Patient Portal offered by [facility] by registering at the following website: [email or web address]. By joining [employer]’s FollowMyHealth portal, you will also be able to view your health information using other applications (apps) compatible with our system.

## 2020-01-07 NOTE — ED PROVIDER NOTE - PMH
Asthma    Benign Essential Hypertension    GERD (Gastroesophageal Reflux Disease)    MVP (mitral valve prolapse)    Right-sided carotid artery disease

## 2020-01-07 NOTE — ED ADULT NURSE NOTE - NSIMPLEMENTINTERV_GEN_ALL_ED
Implemented All Universal Safety Interventions:  [place] to call system. Call bell, personal items and telephone within reach. Instruct patient to call for assistance. Room bathroom lighting operational. Non-slip footwear when patient is off stretcher. Physically safe environment: no spills, clutter or unnecessary equipment. Stretcher in lowest position, wheels locked, appropriate side rails in place.

## 2020-01-07 NOTE — ED PROVIDER NOTE - NSFOLLOWUPINSTRUCTIONS_ED_ALL_ED_FT
1. Take proventil and symbicort as prescribed.      CHEST PAIN - AfterCare(R) Instructions(ER/ED)     Dolor de pecho    LO QUE NECESITA SABER:    El dolor en el pecho puede ser provocado por velia variedad de condiciones, algunas no tan serias y otras que son de peligro mortal. El dolor de pecho también puede ser un síntoma de un problema digestivo, charisse la acidez o velia úlcera estomacal. Un ataque de ansiedad o velia emoción kira, charisse el enojo, también pueden provocar dolor de pecho. Velia infección, inflamación o fractura en un hueso o cartílago en el pecho podría provocar dolor o molestia. En ocasiones el dolor torácico o la presión en el pecho pueden ser el resultado de dixon circulación de la montrell al corazón (angina). El dolor de pecho también puede ser causado por trastornos potencialmente mortales charisse un ataque al corazón o un coágulo de montrell en los pulmones.    INSTRUCCIONES SOBRE [facility]ITAL[facility]:    Llame al 911 si:    Tiene alguno de los siguientes signos de un ataque cardíaco:   Estrujamiento, presión o tensión en carbajal pecho      Usted también podría presentar alguno de los siguientes:   Malestar o dolor en carbajal espalda, rimma, mandíbula, abdomen, o brazo      Falta de aliento      Náuseas o vómitos      Desvanecimiento o sudor frío repentino        Regrese a la cory de emergencias si:    La inflamación en carbajal pecho empeora, aun con tratamiento.      Usted tose o vomita montrell.      Renu heces son negras o tienen montrell.      Usted no puede dejar de vomitar o le duele al tragar.    Comuníquese con carbajal médico si:    Usted tiene preguntas o inquietudes acerca de carbajal condición o cuidado.        Medicamentos:    Los medicamentospueden administrarse para tratar la causa del dolor de pecho. Por ejemplo, analgésicos, medicamentos para la ansiedad o medicamentos para aumentar el flujo de montrell al corazón.      No tome ciertos medicamentos sin antes preguntarle a carbajal médico.Estos incluyen ERICK, suplementos vitamínicos o a base de hierbas u hormonas (estrógeno o progestágeno).      Hines renu medicamentos charisse se le haya indicado.Consulte con carbajal médico si usted sherwin que carbajal medicamento no le está ayudando o si presenta efectos secundarios. Infórmele si es alérgico a cualquier medicamento. Mantenga velia lista actualizada de los medicamentos, las vitaminas y los productos herbales que lavonne. Incluya los siguientes datos de los medicamentos: cantidad, frecuencia y motivo de administración. Traiga con usted la lista o los envases de las píldoras a renu citas de seguimiento. Lleve la lista de los medicamentos con usted en marnie de velia emergencia.    Programe velia hemanth con carbajal médico dentro de 72 horas o charisse se le indique:Es posible que deba regresar para hacerse más pruebas para encontrar la causa del dolor de pecho. Es probable que lo refieran a un especialista, charisse un cardiólogo o un gastroenterólogo. Anote renu preguntas para que se acuerde de hacerlas hiwot renu visitas.    Consejos para vivir saludable:Los siguientes son consejos generales de vane. Si carbajal dolor de pecho es causado por un problema cardíaco, carbajal médico le dará consejos específicos a seguir.    No fume.La nicotina y otros químicos contenidos en los cigarrillos y cigarros pueden causar daño a renu pulmones y el corazón. Pida información a carbajal médico si usted actualmente fuma y necesita ayuda para dejar de fumar. Los cigarrillos electrónicos o el tabaco sin humo igualmente contienen nicotina. Consulte con carbajal médico antes de utilizar estos productos.      Consuma velia variedad de alimentos saludables y bajos en grasas y gilmer.Los alimentos saludables incluyen frutas, verduras, pan integral, productos lácteos bajos en grasa, frijoles, dorie magras y pescado. Pida más información acerca de velia dieta saludable para el corazón.      Consuma abundante agua al día.Carbajal cuerpo está conformado en carbajal mayoría por agua. El agua ayuda al cuerpo a controlar la temperatura y la presión arterial. Pregunte a carbajal médico cuál es la cantidad de agua que debería consumir cada día.      Pregunte acerca de la actividad.Carbajal médico le dirá cuáles actividades limitar y cuáles evitar. Pregunte cuándo puede manejar, regresar a carbajal trabajo y tener relaciones sexuales. Pida más información acerca de un plan de ejercicio adecuado para usted.      Mantenga un peso saludable.Consulte con carbajal médico cuánto debería pesar. [place]ídale que lo ayude a crear un plan para bajar de peso si tiene sobrepeso.      Póngase la vacuna de la gripe y la neumonía.Todos los adultos deberían recibir la vacuna de la influenza (gripe). Vacúnese cada año esquivel pronto charisse la vacuna esté disponible. La vacuna neumocócica se le aplica a adultos de 65 o más años de edad. La vacuna se aplica cada 5 años para prevenir enfermedades neumocócicas, charisse la neumonía.    Si tiene un stent:    Lleve la tarjeta del stent con usted en todo momento.      Informe a todos renu médicos que tiene un stent.         © Copyright [employer] 2020       back to top                      © Copyright [employer] 2020

## 2020-02-07 PROBLEM — I34.1 NONRHEUMATIC MITRAL (VALVE) PROLAPSE: Chronic | Status: ACTIVE | Noted: 2020-01-07

## 2020-03-15 ENCOUNTER — LABORATORY RESULT (OUTPATIENT)
Age: 55
End: 2020-03-15

## 2020-03-16 ENCOUNTER — APPOINTMENT (OUTPATIENT)
Dept: OBGYN | Facility: CLINIC | Age: 55
End: 2020-03-16
Payer: SELF-PAY

## 2020-03-16 ENCOUNTER — ASOB RESULT (OUTPATIENT)
Age: 55
End: 2020-03-16

## 2020-03-16 VITALS — SYSTOLIC BLOOD PRESSURE: 126 MMHG | BODY MASS INDEX: 46 KG/M2 | DIASTOLIC BLOOD PRESSURE: 78 MMHG | WEIGHT: 285 LBS

## 2020-03-16 DIAGNOSIS — R10.2 PELVIC AND PERINEAL PAIN: ICD-10-CM

## 2020-03-16 DIAGNOSIS — Z01.419 ENCOUNTER FOR GYNECOLOGICAL EXAMINATION (GENERAL) (ROUTINE) W/OUT ABNORMAL FINDINGS: ICD-10-CM

## 2020-03-16 DIAGNOSIS — E66.01 MORBID (SEVERE) OBESITY DUE TO EXCESS CALORIES: ICD-10-CM

## 2020-03-16 DIAGNOSIS — B36.9 SUPERFICIAL MYCOSIS, UNSPECIFIED: ICD-10-CM

## 2020-03-16 PROCEDURE — 99212 OFFICE O/P EST SF 10 MIN: CPT | Mod: 25

## 2020-03-16 PROCEDURE — 76830 TRANSVAGINAL US NON-OB: CPT

## 2020-03-16 PROCEDURE — 99386 PREV VISIT NEW AGE 40-64: CPT

## 2020-03-16 RX ORDER — NYSTATIN 100MM UNIT
POWDER (EA) MISCELLANEOUS
Qty: 1 | Refills: 3 | Status: ACTIVE | COMMUNITY
Start: 2020-03-16 | End: 1900-01-01

## 2020-07-04 ENCOUNTER — EMERGENCY (EMERGENCY)
Facility: HOSPITAL | Age: 55
LOS: 1 days | End: 2020-07-04
Attending: EMERGENCY MEDICINE | Admitting: EMERGENCY MEDICINE
Payer: SELF-PAY

## 2020-07-04 VITALS
RESPIRATION RATE: 28 BRPM | HEIGHT: 64 IN | HEART RATE: 95 BPM | TEMPERATURE: 98 F | WEIGHT: 285.94 LBS | OXYGEN SATURATION: 98 % | DIASTOLIC BLOOD PRESSURE: 95 MMHG | SYSTOLIC BLOOD PRESSURE: 146 MMHG

## 2020-07-04 VITALS
HEART RATE: 75 BPM | DIASTOLIC BLOOD PRESSURE: 68 MMHG | RESPIRATION RATE: 20 BRPM | SYSTOLIC BLOOD PRESSURE: 126 MMHG | OXYGEN SATURATION: 98 % | TEMPERATURE: 99 F

## 2020-07-04 LAB
ALBUMIN SERPL ELPH-MCNC: 3.1 G/DL — LOW (ref 3.3–5)
ALP SERPL-CCNC: 97 U/L — SIGNIFICANT CHANGE UP (ref 30–120)
ALT FLD-CCNC: 24 U/L DA — SIGNIFICANT CHANGE UP (ref 10–60)
ANION GAP SERPL CALC-SCNC: 4 MMOL/L — LOW (ref 5–17)
APPEARANCE UR: CLEAR — SIGNIFICANT CHANGE UP
AST SERPL-CCNC: 14 U/L — SIGNIFICANT CHANGE UP (ref 10–40)
BACTERIA # UR AUTO: ABNORMAL
BASOPHILS # BLD AUTO: 0.04 K/UL — SIGNIFICANT CHANGE UP (ref 0–0.2)
BASOPHILS NFR BLD AUTO: 0.4 % — SIGNIFICANT CHANGE UP (ref 0–2)
BILIRUB SERPL-MCNC: 0.3 MG/DL — SIGNIFICANT CHANGE UP (ref 0.2–1.2)
BILIRUB UR-MCNC: NEGATIVE — SIGNIFICANT CHANGE UP
BUN SERPL-MCNC: 12 MG/DL — SIGNIFICANT CHANGE UP (ref 7–23)
CALCIUM SERPL-MCNC: 9.9 MG/DL — SIGNIFICANT CHANGE UP (ref 8.4–10.5)
CHLORIDE SERPL-SCNC: 105 MMOL/L — SIGNIFICANT CHANGE UP (ref 96–108)
CO2 SERPL-SCNC: 32 MMOL/L — HIGH (ref 22–31)
COLOR SPEC: YELLOW — SIGNIFICANT CHANGE UP
CREAT SERPL-MCNC: 0.87 MG/DL — SIGNIFICANT CHANGE UP (ref 0.5–1.3)
D DIMER BLD IA.RAPID-MCNC: 270 NG/ML DDU — HIGH
DIFF PNL FLD: NEGATIVE — SIGNIFICANT CHANGE UP
EOSINOPHIL # BLD AUTO: 0.1 K/UL — SIGNIFICANT CHANGE UP (ref 0–0.5)
EOSINOPHIL NFR BLD AUTO: 0.9 % — SIGNIFICANT CHANGE UP (ref 0–6)
EPI CELLS # UR: ABNORMAL
GLUCOSE SERPL-MCNC: 122 MG/DL — HIGH (ref 70–99)
GLUCOSE UR QL: NEGATIVE MG/DL — SIGNIFICANT CHANGE UP
HCG UR QL: NEGATIVE — SIGNIFICANT CHANGE UP
HCT VFR BLD CALC: 42.5 % — SIGNIFICANT CHANGE UP (ref 34.5–45)
HGB BLD-MCNC: 12.8 G/DL — SIGNIFICANT CHANGE UP (ref 11.5–15.5)
IMM GRANULOCYTES NFR BLD AUTO: 0.4 % — SIGNIFICANT CHANGE UP (ref 0–1.5)
INR BLD: 1.07 RATIO — SIGNIFICANT CHANGE UP (ref 0.88–1.16)
KETONES UR-MCNC: NEGATIVE — SIGNIFICANT CHANGE UP
LEUKOCYTE ESTERASE UR-ACNC: ABNORMAL
LYMPHOCYTES # BLD AUTO: 2.34 K/UL — SIGNIFICANT CHANGE UP (ref 1–3.3)
LYMPHOCYTES # BLD AUTO: 22.1 % — SIGNIFICANT CHANGE UP (ref 13–44)
MCHC RBC-ENTMCNC: 25 PG — LOW (ref 27–34)
MCHC RBC-ENTMCNC: 30.1 GM/DL — LOW (ref 32–36)
MCV RBC AUTO: 83.2 FL — SIGNIFICANT CHANGE UP (ref 80–100)
MONOCYTES # BLD AUTO: 0.7 K/UL — SIGNIFICANT CHANGE UP (ref 0–0.9)
MONOCYTES NFR BLD AUTO: 6.6 % — SIGNIFICANT CHANGE UP (ref 2–14)
NEUTROPHILS # BLD AUTO: 7.38 K/UL — SIGNIFICANT CHANGE UP (ref 1.8–7.4)
NEUTROPHILS NFR BLD AUTO: 69.6 % — SIGNIFICANT CHANGE UP (ref 43–77)
NITRITE UR-MCNC: NEGATIVE — SIGNIFICANT CHANGE UP
NRBC # BLD: 0 /100 WBCS — SIGNIFICANT CHANGE UP (ref 0–0)
PH UR: 7 — SIGNIFICANT CHANGE UP (ref 5–8)
PLATELET # BLD AUTO: 244 K/UL — SIGNIFICANT CHANGE UP (ref 150–400)
POTASSIUM SERPL-MCNC: 3.8 MMOL/L — SIGNIFICANT CHANGE UP (ref 3.5–5.3)
POTASSIUM SERPL-SCNC: 3.8 MMOL/L — SIGNIFICANT CHANGE UP (ref 3.5–5.3)
PROT SERPL-MCNC: 7.5 G/DL — SIGNIFICANT CHANGE UP (ref 6–8.3)
PROT UR-MCNC: NEGATIVE MG/DL — SIGNIFICANT CHANGE UP
PROTHROM AB SERPL-ACNC: 12.9 SEC — SIGNIFICANT CHANGE UP (ref 10.6–13.6)
RBC # BLD: 5.11 M/UL — SIGNIFICANT CHANGE UP (ref 3.8–5.2)
RBC # FLD: 16.3 % — HIGH (ref 10.3–14.5)
RBC CASTS # UR COMP ASSIST: NEGATIVE /HPF — SIGNIFICANT CHANGE UP (ref 0–4)
SODIUM SERPL-SCNC: 141 MMOL/L — SIGNIFICANT CHANGE UP (ref 135–145)
SP GR SPEC: 1 — LOW (ref 1.01–1.02)
TROPONIN I SERPL-MCNC: 0 NG/ML — LOW (ref 0.02–0.06)
UROBILINOGEN FLD QL: NEGATIVE MG/DL — SIGNIFICANT CHANGE UP
WBC # BLD: 10.6 K/UL — HIGH (ref 3.8–10.5)
WBC # FLD AUTO: 10.6 K/UL — HIGH (ref 3.8–10.5)
WBC UR QL: ABNORMAL

## 2020-07-04 PROCEDURE — 81025 URINE PREGNANCY TEST: CPT

## 2020-07-04 PROCEDURE — 71275 CT ANGIOGRAPHY CHEST: CPT | Mod: 26

## 2020-07-04 PROCEDURE — 93005 ELECTROCARDIOGRAM TRACING: CPT

## 2020-07-04 PROCEDURE — 93010 ELECTROCARDIOGRAM REPORT: CPT

## 2020-07-04 PROCEDURE — 84484 ASSAY OF TROPONIN QUANT: CPT

## 2020-07-04 PROCEDURE — 99284 EMERGENCY DEPT VISIT MOD MDM: CPT

## 2020-07-04 PROCEDURE — 71275 CT ANGIOGRAPHY CHEST: CPT

## 2020-07-04 PROCEDURE — 70450 CT HEAD/BRAIN W/O DYE: CPT | Mod: 26

## 2020-07-04 PROCEDURE — 36415 COLL VENOUS BLD VENIPUNCTURE: CPT

## 2020-07-04 PROCEDURE — 85027 COMPLETE CBC AUTOMATED: CPT

## 2020-07-04 PROCEDURE — 71045 X-RAY EXAM CHEST 1 VIEW: CPT

## 2020-07-04 PROCEDURE — 81001 URINALYSIS AUTO W/SCOPE: CPT

## 2020-07-04 PROCEDURE — 80053 COMPREHEN METABOLIC PANEL: CPT

## 2020-07-04 PROCEDURE — 85610 PROTHROMBIN TIME: CPT

## 2020-07-04 PROCEDURE — 71045 X-RAY EXAM CHEST 1 VIEW: CPT | Mod: 26

## 2020-07-04 PROCEDURE — 85379 FIBRIN DEGRADATION QUANT: CPT

## 2020-07-04 PROCEDURE — 99284 EMERGENCY DEPT VISIT MOD MDM: CPT | Mod: 25

## 2020-07-04 PROCEDURE — 70450 CT HEAD/BRAIN W/O DYE: CPT

## 2020-07-04 NOTE — ED PROVIDER NOTE - NSFOLLOWUPINSTRUCTIONS_ED_ALL_ED_FT
Dolor de pecho inespecífico  Nonspecific Chest Pain  El dolor de pecho puede deberse a muchas afecciones diferentes. Algunas causas del dolor de pecho pueden ser potencialmente mortales. Estas requieren tratamiento inmediato. Algunas causas grave de dolor en el pecho son las siguientes:  Infarto de miocardio.Un desgarro en el vaso sanguíneo principal del cuerpo.Enrojecimiento e hinchazón (inflamación) alrededor del corazón.Un coágulo de montrell en los pulmones.Otras causas de dolor en el pecho pueden no ser tan graves. Estos incluyen los siguientes:  Acidez estomacal.Ansiedad o estrés.Daño en los huesos o músculos del corazón.Infecciones pulmonares.El dolor de pecho puede provocar las siguientes sensaciones:  Dolor o molestias en el pecho.Dolor opresivo, continuo o constrictivo. Ardor u hormigueo. Dolor sordo o intenso que empeora al moverse, toser o inhalar profundamente. Dolor o molestias que también se sienten en la espalda, el rimma, la mandíbula, el hombro o el brazo, o dolor que se extiende a cualquiera de estas zonas.Es difícil saber si la causa del dolor es algo grave o algo que no es tan grave. Por lo tanto, es importante que consulte al médico inmediatamente si tiene dolor en el pecho.  Siga estas indicaciones en carbajal casa:  Medicamentos     [place] los medicamentos de venta myla y los recetados solamente charisse se lo haya indicado el médico.Si le recetaron un antibiótico, tómelo charisse se lo haya indicado el médico. No deje de calin los antibióticos aunque comience a sentirse mejor.Estilo de marco        [place] reposo charisse se lo haya indicado el médico.No consuma ningún producto que contenga nicotina o tabaco, charisse cigarrillos, cigarrillos electrónicos y tabaco de mascar. Si necesita ayuda para dejar de fumar, consulte al médico.No martha alcohol.Clifford cambios en carbajal estilo de marco según las indicaciones del médico. Estos pueden incluir lo siguiente:  Practicar actividad física con regularidad. Pregúntele al médico qué actividades son seguras para usted.Seguir velia dieta cardiosaludable. Un especialista en dietas y alimentación (nutricionista) puede ayudarlo a que clifford elecciones saludables.Mantener un peso saludable.Tratar la diabetes o la presión arterial don, si es necesario.Reducir el estrés. Las actividades charisse el yoga y las técnicas de relajación pueden ayudar.Indicaciones generales     Esté atento a cualquier cambio en los síntomas. Informe a carbajal médico si presenta algún cambio en los síntomas o si aparecen síntomas nuevos.Evite las actividades que le causen dolor de pecho.Concurra a todas las visitas de seguimiento charisse se lo haya indicado el médico. [place] es importante. Es posible que tenga que someterse a más estudios si el dolor de pecho no desaparece.Comuníquese con un médico si:  El dolor de pecho no desaparece.Se siente deprimido.Tiene fiebre.Solicite ayuda inmediatamente si:  El dolor en el pecho es más intenso.Tiene tos que empeora o tose con montrell.Tiene dolor muy intenso en el vientre (abdomen).Pierde el conocimiento (se desmaya).Tiene cualquiera de estos síntomas sin ninguna causa reshma:  Malestar repentino en el pecho.Molestias repentinas en los brazos, la espalda, el rimma o la mandíbula.Le falta el aire en cualquier momento.Comienza a sudar de manera repentina o la piel se le humedece.Siente malestar estomacal (náuseas).Vomita.Se siente repentinamente mareado o se desmaya.Se siente muy débil o cansado.El corazón comienza a latirle rápidamente o parece que se saltea latidos.Estos síntomas pueden indicar velia emergencia. No espere a adán si los síntomas desaparecen. Solicite atención médica de inmediato. Comuníquese con el servicio de emergencias de carbajal localidad (911 en los [place]). No conduzca por oralia propios medios hasta el hospital.   Resumen  El dolor de pecho puede deberse a muchas afecciones diferentes. La causa puede ser grave y requerir tratamiento de inmediato. Si tiene dolor de pecho, consulte al médico de inmediato.Siga las indicaciones del médico para calin los medicamentos y hacer cambios en carbajal estilo de marco. Concurra a todas las visitas de seguimiento charisse se lo haya indicado el médico. [place] incluye las visitas para realizarle otros estudios si el dolor de pecho no desaparece.Asegúrese de conocer los signos que indican que carbajal afección ha empeorado. Obtenga ayuda de inmediato si tiene esos síntomas.Esta información no tiene charisse fin reemplazar el consejo del médico. Asegúrese de hacerle al médico cualquier pregunta que tenga.

## 2020-07-04 NOTE — ED PROVIDER NOTE - PROVIDER TOKENS
PROVIDER:[TOKEN:[[id number]],FOLLOWUP:[1-3 Days]],PROVIDER:[TOKEN:[[id number]],FOLLOWUP:[1-3 Days]]

## 2020-07-04 NOTE — ED PROVIDER NOTE - PATIENT PORTAL LINK FT
You can access the FollowMyHealth Patient Portal offered by [facility] by registering at the following website: [email or web address]. By joining [facility]’s FollowMyHealth portal, you will also be able to view your health information using other applications (apps) compatible with our system.

## 2020-07-04 NOTE — ED PROVIDER NOTE - PROGRESS NOTE DETAILS
Labs, CT EKG all normal. Symptoms completely resolved. Plan - DC home with cardio and IM follow up this week.

## 2020-07-04 NOTE — ED ADULT TRIAGE NOTE - CHIEF COMPLAINT QUOTE
according to  approx 30 mins ago pt developed mid chest discomfort with SOB. Pt took ASA & Prpoanolol PTA

## 2020-07-04 NOTE — ED PROVIDER NOTE - CHPI ED SYMPTOMS NEG
no nausea/no cough/no chills/no dizziness/no diaphoresis/no back pain/no syncope/no fever/no vomiting

## 2020-07-04 NOTE — ED ADULT NURSE NOTE - OBJECTIVE STATEMENT
morbid patient has c/o chest pain earlier but patient stated she has no pain at this time, hx of mitral valve regurgitation, took asa and propranolol at home, feels better, ekg done, MD at bedside and assess patient. IV accessed and tolerating. Labs drawn & sent results pending. ambulates to void, Patient denies sick contacts/ no fever/chills/cough at this time, denies SOB and no acute distress. cardiac monitor at bedside placed on chest, will continue to monitor.

## 2020-07-04 NOTE — ED PROVIDER NOTE - OBJECTIVE STATEMENT
55 yo F with hx of migraine, MVP, ?carotid stenosis c/o chest pain SOB and rt arm weakness today. Started about 2 hours ago. Resolved after arrival to ER. Now feels better. Denies fever, headache, visual disturbance, cough, SOB, NV, abd, pain, or other symptom. PCP in [place]. Of note pt has been in ED many times over past few years for similar symptoms and has had negative workups.

## 2020-07-04 NOTE — ED ADULT NURSE NOTE - NSIMPLEMENTINTERV_GEN_ALL_ED
Implemented All Universal Safety Interventions:  [place] to call system. Call bell, personal items and telephone within reach. Instruct patient to call for assistance. Room bathroom lighting operational. Non-slip footwear when patient is off stretcher. Physically safe environment: no spills, clutter or unnecessary equipment. Stretcher in lowest position, wheels locked, appropriate side rails in place.

## 2020-07-04 NOTE — ED PROVIDER NOTE - CROS ED NEURO NEG
Spouse/Significant other
no headache/no change in level of consciousness/no difficulty walking/imbalance/no seizures

## 2020-07-04 NOTE — ED PROVIDER NOTE - CLINICAL SUMMARY MEDICAL DECISION MAKING FREE TEXT BOX
nonspecific chest pain and weakness resolved upon arrival to ER. PE unrevealing. Plan - Labs, CXR, CT brain. Trop.

## 2020-07-04 NOTE — ED ADULT NURSE NOTE - CHPI ED NUR SYMPTOMS NEG
no back pain/no shortness of breath/no vomiting/no dizziness/no congestion/no nausea/no syncope/no chills/no diaphoresis/no fever

## 2020-07-04 NOTE — ED PROVIDER NOTE - CARE PROVIDER_API CALL
Brett Khalil  CARDIOVASCULAR DISEASE  [address] [place] [facility] [place] [place] [address]  [place] [address]  Phone: [phone]  Fax: [phone]  Follow Up Time: 1-3 Days    Jeni Ha  INTERNAL MEDICINE  [address] [place] [address]   [place] [address]  Phone: [phone]  Fax: [phone]  Follow Up Time: 1-3 Days

## 2020-07-04 NOTE — ED ADULT NURSE NOTE - PATIENT PROVIDED WITH THE SEVEN POINTS OF INFORMATION ABOUT HIV REQUIRED BY THE PUBLIC HEALTH LAW
----- Message from Sydney Downing sent at 11/16/2017  3:08 PM CST -----  Pt has lab work scheduled for 11/20 and need orders attached please  
----- Message from Sydney Downing sent at 11/16/2017  3:08 PM CST -----  Pt has lab work scheduled for 11/20 and need orders attached please  
Labs linked  
Labs ordered   
Statement Selected

## 2020-08-17 ENCOUNTER — EMERGENCY (EMERGENCY)
Facility: HOSPITAL | Age: 55
LOS: 1 days | Discharge: HOME | End: 2020-08-17
Attending: EMERGENCY MEDICINE | Admitting: EMERGENCY MEDICINE
Payer: SELF-PAY

## 2020-08-17 VITALS
HEART RATE: 90 BPM | SYSTOLIC BLOOD PRESSURE: 112 MMHG | TEMPERATURE: 98 F | RESPIRATION RATE: 18 BRPM | DIASTOLIC BLOOD PRESSURE: 78 MMHG | OXYGEN SATURATION: 99 %

## 2020-08-17 VITALS
OXYGEN SATURATION: 97 % | HEIGHT: 64 IN | SYSTOLIC BLOOD PRESSURE: 119 MMHG | TEMPERATURE: 98 F | WEIGHT: 285.06 LBS | DIASTOLIC BLOOD PRESSURE: 71 MMHG | RESPIRATION RATE: 20 BRPM | HEART RATE: 93 BPM

## 2020-08-17 PROCEDURE — 99283 EMERGENCY DEPT VISIT LOW MDM: CPT

## 2020-08-17 PROCEDURE — 94640 AIRWAY INHALATION TREATMENT: CPT

## 2020-08-17 PROCEDURE — 99283 EMERGENCY DEPT VISIT LOW MDM: CPT | Mod: 25

## 2020-08-17 RX ORDER — IPRATROPIUM/ALBUTEROL SULFATE 18-103MCG
3 AEROSOL WITH ADAPTER (GRAM) INHALATION ONCE
Refills: 0 | Status: COMPLETED | OUTPATIENT
Start: 2020-08-17 | End: 2020-08-17

## 2020-08-17 RX ORDER — ALBUTEROL 90 UG/1
2 AEROSOL, METERED ORAL
Qty: 1 | Refills: 0
Start: 2020-08-17 | End: 2020-09-15

## 2020-08-17 RX ADMIN — Medication 60 MILLIGRAM(S): at 19:29

## 2020-08-17 RX ADMIN — Medication 3 MILLILITER(S): at 19:42

## 2020-08-17 NOTE — ED PROVIDER NOTE - PMH
Asthma    Benign Essential Hypertension    GERD (Gastroesophageal Reflux Disease)    HTN (hypertension)    MVP (mitral valve prolapse)    Right-sided carotid artery disease

## 2020-08-17 NOTE — ED PROVIDER NOTE - PATIENT PORTAL LINK FT
You can access the FollowMyHealth Patient Portal offered by [facility] by registering at the following website: [email or web address]. By joining [facility]’s FollowMyHealth portal, you will also be able to view your health information using other applications (apps) compatible with our system.

## 2020-08-17 NOTE — ED PROVIDER NOTE - OBJECTIVE STATEMENT
54 y.o. F with chronic mild persistent asthma states she uses her symbicort as prescribed twice a day every day, but states she always still has a little of her asthma symtoms daily, tonight asthma worsened, thinks was related to getting anxious while speaking with son tonight - did not use her rescue inhaler as she was unsure if she should none

## 2020-08-17 NOTE — ED PROVIDER NOTE - CCCP TRG CHIEF CMPLNT
88 yo male hx mild dementia, COPD, pulmonary fibrosis, DVT/PE, resident of a SNF who is being treated for COVID 19 pneumonia.  He was treated with zosyn and azithro at his SNF before being sent to [facility] for evaluation and testing.  s/p steroids and plaquenil and doxy.  He had elevated inflammatory markers ,now  moderating, CRP is 10, ferritin is 820 and d-dimer is now 436, down from peak of .  It appears that his lung damage reflects pre-existing lung disease, direct effects of infection, and potentially inflammatory component of illness.    Acute hypoxic respiratory failure 2/ BL PNA found to be COVID positive:   -Sat 93 % 1LNC  - Reswab - covid positive  reswab - negative   - d-dimer fluctuatin> 580>.[id number]<-[phone]<-[id number]   - Discussed with ID. holding tocilizumab   - prone as tolerated for ventilation  - CXR : stable left infiltrates, increased right infiltrates- no need for further abx per ID, likely from chronic lung disease  - monitor inflammatory markers, overall slowly trending down: CRP (11.06), Ferritin (820), LDH (584)   - Completed hydroxychloroquine (completed ) and steroids   - Acetaminophen 650 mg PO q4h PRN fever. Limit use of NSAIDs. Gauifenesin PRN  - HFA albuterol Q6 hour PRN via MDI.   - DVT PPx: with therapeutic Lovenox in light of elevated d-dimer and previous hx of VTE (Improve score 5). Xarelto 10mg upon discharge  - Dr. Looney had goals of Care discussion with patient's daughter Ella; remains FULL CODE       FALL  -as stated above  -CT Head negative   -Fall precautions  ordered, bed alarm now on    COPD  Pulmonary Fibrosis  -Completed 5 day course of steroids  -Continue Symbocort/ Albuterol MDI  -Spiriva while in house  -Oxygen support as needed to maintain O2 >88%.  Currently weaned from 1-2 L/min.      Leukocytosis  -Resolved  -2/2 to steroids.     DYSURIA  -Resolved  -UA/Urine cx - NG     Hx DVT/PE:  -Continue with therapeutic lovenox 60mg BID for elevated D-dimer and COVID. Xarelto on dc  -D-dimer trending down (580)    HTN  -Continue metoprolol    Dementia  -A &O x 2 at baseline, more agitated today  -Continue to monitor  -No longer requiring restraints    Hypernatremia: Resolved.  -Encourage PO intake  -Monitor labs       Pain:  -Tramadol PRN    DVT ppx  -Lovenox 60 mg BID    Dysphagia:  -Continue modified diet: Pureed-[place]    Advance care planning  multiple GOC conversations held throughout hospital course and pt's daughter would like FULL CODE    DISPO:  Back to [facility] [place]- dc planning for today    however daughter concerned that insurance may not cover any more--> CM aware and will speak with daughter  updated daughter today of plan asthma exacerbation

## 2020-08-17 NOTE — ED PROVIDER NOTE - CLINICAL SUMMARY MEDICAL DECISION MAKING FREE TEXT BOX
pt with mild persistent asthma having increase in symptoms today, did not use rescue inhaler - give one duoneb and 1 dose prednisone - based on history, should f/u with PCP to discuss further management of chronic asthma symptoms, given acute exacerbation tonight, will add short course steroids

## 2020-08-24 ENCOUNTER — EMERGENCY (EMERGENCY)
Facility: HOSPITAL | Age: 55
LOS: 1 days | Discharge: HOME | End: 2020-08-24
Attending: EMERGENCY MEDICINE | Admitting: EMERGENCY MEDICINE
Payer: SELF-PAY

## 2020-08-24 VITALS
RESPIRATION RATE: 20 BRPM | DIASTOLIC BLOOD PRESSURE: 75 MMHG | SYSTOLIC BLOOD PRESSURE: 110 MMHG | OXYGEN SATURATION: 97 % | TEMPERATURE: 98 F | WEIGHT: 285.94 LBS | HEIGHT: 64 IN | HEART RATE: 89 BPM

## 2020-08-24 VITALS
SYSTOLIC BLOOD PRESSURE: 112 MMHG | DIASTOLIC BLOOD PRESSURE: 74 MMHG | RESPIRATION RATE: 18 BRPM | HEART RATE: 82 BPM | OXYGEN SATURATION: 98 % | TEMPERATURE: 98 F

## 2020-08-24 PROCEDURE — 99053 MED SERV 10PM-8AM 24 HR FAC: CPT

## 2020-08-24 PROCEDURE — 94640 AIRWAY INHALATION TREATMENT: CPT

## 2020-08-24 PROCEDURE — 71045 X-RAY EXAM CHEST 1 VIEW: CPT | Mod: 26

## 2020-08-24 PROCEDURE — U0003: CPT

## 2020-08-24 PROCEDURE — 99284 EMERGENCY DEPT VISIT MOD MDM: CPT

## 2020-08-24 PROCEDURE — 99283 EMERGENCY DEPT VISIT LOW MDM: CPT | Mod: 25

## 2020-08-24 PROCEDURE — 71045 X-RAY EXAM CHEST 1 VIEW: CPT

## 2020-08-24 RX ORDER — ALBUTEROL 90 UG/1
2 AEROSOL, METERED ORAL
Qty: 1 | Refills: 0
Start: 2020-08-24 | End: 2020-09-22

## 2020-08-24 RX ORDER — IPRATROPIUM/ALBUTEROL SULFATE 18-103MCG
3 AEROSOL WITH ADAPTER (GRAM) INHALATION ONCE
Refills: 0 | Status: DISCONTINUED | OUTPATIENT
Start: 2020-08-24 | End: 2020-08-24

## 2020-08-24 RX ORDER — IPRATROPIUM/ALBUTEROL SULFATE 18-103MCG
4 AEROSOL WITH ADAPTER (GRAM) INHALATION ONCE
Refills: 0 | Status: COMPLETED | OUTPATIENT
Start: 2020-08-24 | End: 2020-08-24

## 2020-08-24 RX ADMIN — Medication 4 PUFF(S): at 22:03

## 2020-08-24 RX ADMIN — Medication 60 MILLIGRAM(S): at 22:29

## 2020-08-24 NOTE — ED PROVIDER NOTE - OBJECTIVE STATEMENT
Patient c/o SOB for a week. States her asthma is acting up. Was seen here a week ago and given an albuterol inhaler. States it helps, but she gets jittery from it. Mild cough. No fever. No pain. No uri symptoms. Was given 3 days of prednisone when she was seen a week ago. No travel. No sick contacts. No leg pains or swelling. Had a CTA last month

## 2020-08-24 NOTE — ED PROVIDER NOTE - CARE PROVIDER_API CALL
Darien Carcamo  CRITICAL CARE MEDICINE  [address] [place] [address]  Phone: [phone]  Fax: [phone]  Follow Up Time:

## 2020-08-24 NOTE — ED ADULT NURSE NOTE - CADM POA PRESS ULCER
"Subjective:      Veronica Reyes is a 28 y.o. female who presents with Pharyngitis (cant eat x3 days )            HPI  Sore throat x 3 days. Taking Demetra [place] cold/flu med. States PND, no nasal congestion,  But mild cough, right ear pain. No fevers. No nasal spray or saline nasal rinse. No salt water gargle. Hurts to swallow, able to drink fluids.    PMH:  has a past medical history of Acne; Chlamydia infection (8/15/11); and Migraine. She also has no past medical history of ASTHMA or Diabetes.  MEDS:   Current Outpatient Prescriptions:   •  acyclovir (ZOVIRAX) 400 MG tablet, TAKE 1 TAB BY MOUTH 3 TIMES A DAY FOR 7 DAYS. (Patient not taking: Reported on 1/11/2019), Disp: 21 Tab, Rfl: 0  ALLERGIES:   Allergies   Allergen Reactions   • Bactrim Vomiting     hepatitis   • Imitrex [Sumatriptan Succinate]    • Morphine Sulfate Rash and Itching   • Tdap [Dipth, Acell Pertus, Tetanus]      seizure     SURGHX: History reviewed. No pertinent surgical history.  SOCHX:  reports that she has never smoked. She has never used smokeless tobacco. She reports that she drinks alcohol. She reports that she does not use drugs.  FH: Family history was reviewed, no pertinent findings to report      Review of Systems   Constitutional: Negative for chills, fever and malaise/fatigue.   HENT: Positive for congestion, ear pain and sore throat.    Eyes: Negative for discharge and redness.   Respiratory: Positive for cough. Negative for shortness of breath and wheezing.    Gastrointestinal: Negative for abdominal pain, constipation, diarrhea, nausea and vomiting.   Musculoskeletal: Negative for myalgias and neck pain.   Skin: Negative for itching and rash.   Neurological: Negative for dizziness, weakness and headaches.   Endo/Heme/Allergies: Negative for environmental allergies.   All other systems reviewed and are negative.         Objective:     /64   Pulse 88   Temp 36.8 °C (98.2 °F) (Temporal)   Resp 14   Ht 1.651 m (5' 5\")   Wt 68 " kg (150 lb)   SpO2 98%   BMI 24.96 kg/m²      Physical Exam   Constitutional: She is oriented to person, place, and time. Vital signs are normal. She appears well-developed and well-nourished. She is active and cooperative.  Non-toxic appearance. She does not have a sickly appearance. She does not appear ill. No distress.   HENT:   Head: Normocephalic.   Right Ear: External ear and ear canal normal. A middle ear effusion is present.   Left Ear: External ear and ear canal normal. A middle ear effusion is present.   Nose: Mucosal edema and rhinorrhea present. No sinus tenderness.   Mouth/Throat: Uvula is midline. Mucous membranes are dry. No uvula swelling. Posterior oropharyngeal erythema present.   Eyes: Pupils are equal, round, and reactive to light. Conjunctivae and EOM are normal.   Neck: Normal range of motion. Neck supple.   Cardiovascular: Normal rate and regular rhythm.    Pulmonary/Chest: Effort normal and breath sounds normal. No accessory muscle usage. No respiratory distress.   Musculoskeletal: Normal range of motion.   Neurological: She is alert and oriented to person, place, and time.   Skin: Skin is warm and dry. She is not diaphoretic.   Vitals reviewed.              Assessment/Plan:     1. Right ear pain      2. Sore throat    - POCT Rapid Strep A: NEG    3. PND (post-nasal drip)      4. Cough    5. Eustachian tube dysfunction, right    6. Non-allergic rhinitis        Increase water intake  May use Ibuprofen/Tylenol prn for any fever, body aches or throat pain  May take long acting antihistamine for seasonal allergy symptoms prn  May use saline nasal spray/estefania pot for nasal congestion prn  May use Nasacort/Flonase prn for nasal congestion  May use throat lozenges for throat discomfort  May gargle with salt water prn for throat discomfort  May drink smoothies for nutrition if too painful to swallow solid foods  Monitor for any sinus pain/pressure with sinus congestion with thick mucus production  and HA, cough, SOB, fever- need re-evaluation         No

## 2020-08-24 NOTE — ED PROVIDER NOTE - CLINICAL SUMMARY MEDICAL DECISION MAKING FREE TEXT BOX
Patient with persistent SOB for a week. Responds to albuterol. Will get cxr and treat symptoms. Will restart prednisone and reevaluate

## 2020-08-24 NOTE — ED ADULT NURSE NOTE - GASTROINTESTINAL ASSESSMENT
Physical Therapy Daily Treatment    Visit Count: 9  Plan of Care: Initial: 9/23/2019 Through: 11/18/2019  Insurance Information: [facility]  Co pay=None  Co Ins=80/20  Visits=20 PT MAX  Ded $ 5000  OOP $ 6500/820 Met  Auth=No     Referred by: Juan Saenz MD  Medical Diagnosis (from order):       Diagnosis Information             Diagnosis      717.7 (ICD-9-CM) - M22.42 (ICD-10-CM) - Patellofemoral chondrosis, left      716.96 (ICD-9-CM) - M17.12 (ICD-10-CM) - Arthritis of left knee                  Treatment Diagnosis: Knee Symptoms with increased pain/symptoms, impaired strength, impaired range of motion, impaired muscle length/flexibility, impaired tissue mobility, increased swelling, impaired joint mobility/play, impaired gait, impaired mobility, impaired activity tolerance, impaired rnage of motion      Date of Surgery: 9/19/2019 ; Surgery to be performed:  Left Total Knee Arthroplasty  Chart reviewed at time of initial evaluation (relevant co-morbidities, allergies, tests and medications listed):       SUBJECTIVE      Patient reported that his leg is a little stiff this afternoon, he reports that today it feels good but that it was a little sore after mondays session.     Current Pain (0-10 scale): 2    Functional Change: using cane more     OBJECTIVE   Knee Range of Motion (degrees):    Left Right Left   Flexion (135) 72* 132 89 at beginning of session, 98, after bicycle, 107 end of session   Extension (0-5) 3* 18 0    standard testing positions unless otherwise noted, Key: ranges are reported in active range of motion unless noted as AA=active assistive or P=passive range of motion, (norms), * denotes pain   Only those motions that were assessed are noted.        Treatment     Manual Therapy   Seated Tibiofemoral Posterior mobilization with IR to emphasize knee flexion grade III, IV 5 x 10 in progressing knee flexion positions  Supine (L) inguinal, popliteal, and ankle lymph node drainage with LE elevated x 15  minutes    Therapeutic Exercise:   Scifit recumbent bicycle, seat 18, x 8 minutes for neuro vascularization of tissue   Agonist & Antagonist Contract Relax Knee Flexion against therapist resistance, followed by eccentric knee flexion against therapist resistance x 20 throughout session  Sitting Knee Flexion Isometrics at end range hold 10 seconds x 20 throughout session to re-train  Mini squats at parallel bars with (B) hand support 3 x 10  Lunges at parallel bars with alternating foot forward 2 x 10  Ambulation with emphasis on knee extension in swing phase and knee extension in stance phase, 50 ft x 5      Skilled input: verbal instruction/cues, tactile instruction/cues, facilitation    Home Program:   9/23/19:   Heel slides Supine 10-20 2x/day  Quad sets 10, hold 5 seconds 2x/day  Heel slides sitting 10-20 2x/day  Bridges 10 2x/day  Knee extension sitting 10-20 2x/day   10/7/19: Standing Hamstring stretch 3 x 1 minute  10/9/19: Knee flexion isometric holds 10 x 10 seconds 2x/day    Writer verbally educated the patient and received verbal consent from the patient on hand placement, positioning of patient, and techniques to be performed today including therapist position for techniques, hand placement and palpation for techniques as described above and how they are pertinent to the patient's plan of care.      Suggestions for next session as indicated: progress per plan of care, advance range of motion of knee, continue with joint mobility, and hip strengthening as tolerated, continue decreasing swelling of LLE    ASSESSMENT     Patient displayed increased active knee flexion ROM after the bicycle indicating swelling is contributing to limited ROM, patient was educated to keep wearing compression sock. Patient displayed stability walking without the cane and was able to achieve a moderate walking speed with no concerns, educated to discontinue use of cane.     Pain after treatment (patient reported, 0-10 scale):  \"alright\"   Result of above outlined education: Needs reinforcement    THERAPY DAILY BILLING   Insurance: [employer] 2. N/A    Evaluation Procedures:  No evaluation codes were used on this date of service     Timed Procedures:  Manual Therapy, 23 minutes  Therapeutic Exercise, 17 minutes    Untimed Procedures:  No untimed codes were used on this date of service    Total Treatment Time: 40 minutes    I certify that I have directed and/or performed this entire treatment session. All clinical decisions and techniques were approved and/or performed by Federico Feliciano, PT, DPT, COMT, FAAOMPT.       - - -

## 2020-08-24 NOTE — ED PROVIDER NOTE - NSFOLLOWUPINSTRUCTIONS_ED_ALL_ED_FT
Asthma    WHAT YOU NEED TO KNOW:    What is asthma? Asthma is a lung disease that makes breathing difficult. Chronic inflammation and reactions to triggers narrow the airways in the lungs. Asthma can become life-threatening if it is not managed.     What is cough-variant asthma? Cough-variant asthma is a type of asthma that causes a dry cough that keeps coming back. A dry cough may be your only symptom, or you may also have chest tightness. These symptoms may be caused by exercise or exposure to odors, allergens, or respiratory tract infections. Cough-variant asthma is treated the same way as typical asthma.     What are the signs and symptoms of asthma?     Coughing       Wheezing       Shortness of breath       Chest tightness     What may trigger an asthma attack?     A cold, the flu, or a sinus infection       Exercise       Weather changes, especially cold, dry air      Smoking or secondhand smoke      Fumes from chemicals, dust, air pollution, or other small particles in the air      Pets, pollen, dust mites, or cockroaches    How is asthma diagnosed? Your healthcare provider will examine you and listen to your lungs. He or she will ask how often you have symptoms and what makes them worse. Tell him or her if you have trouble sleeping, exercising, or doing other activities because of shortness of breath. Your provider will ask about your allergies and past colds, and if anyone in your family has allergies or asthma. Tell your healthcare provider about medicines you take, including over-the-counter drugs and herbal supplements. You may need a chest x-ray to check for lung problems, or a lung function test. Lung function tests show how well you can breathe.     How is asthma treated?     Medicines decrease inflammation, open airways, and make it easier to breathe. Medicines may be inhaled, taken as a pill, or injected. Short-term medicines relieve your symptoms quickly. Long-term medicines are used to prevent future attacks. You may also need medicine to help control your allergies.       Allergy testing may find allergies that trigger an asthma attack. You may need allergy shots or medicine to control allergies that make your asthma worse.     How can I manage my symptoms and prevent future attacks?     Follow your Asthma Action Plan (AAP). This is a written plan that you and your healthcare provider create. It explains which medicine you need and when to change doses if necessary. It also explains how you can monitor symptoms and use a peak flow meter. The meter measures how well your lungs are working.       Manage other health conditions, such as allergies, acid reflux, and sleep apnea.       Identify and avoid triggers. These may include pets, dust mites, mold, and cockroaches.      Do not smoke or be around others who smoke. Nicotine and other chemicals in cigarettes and cigars can cause lung damage. Ask your healthcare provider for information if you currently smoke and need help to quit. E-cigarettes or smokeless tobacco still contain nicotine. Talk to your healthcare provider before you use these products.       Ask about the flu vaccine. The flu can make your asthma worse. You may need a yearly flu shot.    Call your local emergency number (911 in the ) if:     You have severe shortness of breath.       Your lips or nails turn blue or gray.       The skin around your neck and ribs pulls in with each breath.    When should I call my doctor?     You have shortness of breath, even after you take your short-term medicine as directed.       Your peak flow numbers are in the red zone of your AAP.      You run out of medicine before your next refill is due.      Your symptoms get worse.      You need to take more medicine than usual to control your symptoms.       You have questions or concerns about your condition or care.    CARE AGREEMENT:    You have the right to help plan your care. Learn about your health condition and how it may be treated. Discuss treatment options with your healthcare providers to decide what care you want to receive. You always have the right to refuse treatment.

## 2020-08-24 NOTE — ED PROVIDER NOTE - PATIENT PORTAL LINK FT
You can access the FollowMyHealth Patient Portal offered by [facility] by registering at the following website: [email or web address]. By joining [employer]’s FollowMyHealth portal, you will also be able to view your health information using other applications (apps) compatible with our system.

## 2020-08-25 PROBLEM — I10 ESSENTIAL (PRIMARY) HYPERTENSION: Chronic | Status: ACTIVE | Noted: 2020-08-17

## 2020-08-25 LAB — SARS-COV-2 RNA SPEC QL NAA+PROBE: SIGNIFICANT CHANGE UP

## 2020-08-26 NOTE — ED PROVIDER NOTE - CARDIOVASCULAR [-], MLM
Pt here for post-op   Pt states that she is still bleeding very light   Pharmacy verified  Good # [phone]  interp ID:[id number]  
no peripheral edema/no palpitations/no syncope

## 2020-09-03 ENCOUNTER — EMERGENCY (EMERGENCY)
Facility: HOSPITAL | Age: 55
LOS: 1 days | Discharge: HOME | End: 2020-09-03
Attending: EMERGENCY MEDICINE | Admitting: EMERGENCY MEDICINE
Payer: COMMERCIAL

## 2020-09-03 VITALS
OXYGEN SATURATION: 98 % | HEART RATE: 94 BPM | WEIGHT: 285.94 LBS | DIASTOLIC BLOOD PRESSURE: 82 MMHG | RESPIRATION RATE: 18 BRPM | HEIGHT: 64 IN | TEMPERATURE: 98 F | SYSTOLIC BLOOD PRESSURE: 151 MMHG

## 2020-09-03 VITALS
TEMPERATURE: 98 F | OXYGEN SATURATION: 96 % | RESPIRATION RATE: 17 BRPM | HEART RATE: 90 BPM | DIASTOLIC BLOOD PRESSURE: 60 MMHG | SYSTOLIC BLOOD PRESSURE: 136 MMHG

## 2020-09-03 LAB
ALBUMIN SERPL ELPH-MCNC: 2.9 G/DL — LOW (ref 3.3–5)
ALP SERPL-CCNC: 79 U/L — SIGNIFICANT CHANGE UP (ref 30–120)
ALT FLD-CCNC: 26 U/L DA — SIGNIFICANT CHANGE UP (ref 10–60)
ANION GAP SERPL CALC-SCNC: 3 MMOL/L — LOW (ref 5–17)
APPEARANCE UR: ABNORMAL
AST SERPL-CCNC: 13 U/L — SIGNIFICANT CHANGE UP (ref 10–40)
BASOPHILS # BLD AUTO: 0.02 K/UL — SIGNIFICANT CHANGE UP (ref 0–0.2)
BASOPHILS NFR BLD AUTO: 0.2 % — SIGNIFICANT CHANGE UP (ref 0–2)
BILIRUB SERPL-MCNC: 0.6 MG/DL — SIGNIFICANT CHANGE UP (ref 0.2–1.2)
BILIRUB UR-MCNC: NEGATIVE — SIGNIFICANT CHANGE UP
BUN SERPL-MCNC: 17 MG/DL — SIGNIFICANT CHANGE UP (ref 7–23)
CALCIUM SERPL-MCNC: 9.3 MG/DL — SIGNIFICANT CHANGE UP (ref 8.4–10.5)
CHLORIDE SERPL-SCNC: 104 MMOL/L — SIGNIFICANT CHANGE UP (ref 96–108)
CO2 SERPL-SCNC: 33 MMOL/L — HIGH (ref 22–31)
COLOR SPEC: YELLOW — SIGNIFICANT CHANGE UP
CREAT SERPL-MCNC: 1.1 MG/DL — SIGNIFICANT CHANGE UP (ref 0.5–1.3)
DIFF PNL FLD: ABNORMAL
EOSINOPHIL # BLD AUTO: 0.08 K/UL — SIGNIFICANT CHANGE UP (ref 0–0.5)
EOSINOPHIL NFR BLD AUTO: 0.8 % — SIGNIFICANT CHANGE UP (ref 0–6)
GLUCOSE SERPL-MCNC: 162 MG/DL — HIGH (ref 70–99)
GLUCOSE UR QL: NEGATIVE MG/DL — SIGNIFICANT CHANGE UP
HCG UR QL: NEGATIVE — SIGNIFICANT CHANGE UP
HCT VFR BLD CALC: 40.7 % — SIGNIFICANT CHANGE UP (ref 34.5–45)
HGB BLD-MCNC: 12.3 G/DL — SIGNIFICANT CHANGE UP (ref 11.5–15.5)
IMM GRANULOCYTES NFR BLD AUTO: 0.3 % — SIGNIFICANT CHANGE UP (ref 0–1.5)
KETONES UR-MCNC: NEGATIVE — SIGNIFICANT CHANGE UP
LEUKOCYTE ESTERASE UR-ACNC: ABNORMAL
LIDOCAIN IGE QN: 98 U/L — SIGNIFICANT CHANGE UP (ref 73–393)
LYMPHOCYTES # BLD AUTO: 2.14 K/UL — SIGNIFICANT CHANGE UP (ref 1–3.3)
LYMPHOCYTES # BLD AUTO: 21.3 % — SIGNIFICANT CHANGE UP (ref 13–44)
MCHC RBC-ENTMCNC: 24.9 PG — LOW (ref 27–34)
MCHC RBC-ENTMCNC: 30.2 GM/DL — LOW (ref 32–36)
MCV RBC AUTO: 82.4 FL — SIGNIFICANT CHANGE UP (ref 80–100)
MONOCYTES # BLD AUTO: 0.49 K/UL — SIGNIFICANT CHANGE UP (ref 0–0.9)
MONOCYTES NFR BLD AUTO: 4.9 % — SIGNIFICANT CHANGE UP (ref 2–14)
NEUTROPHILS # BLD AUTO: 7.3 K/UL — SIGNIFICANT CHANGE UP (ref 1.8–7.4)
NEUTROPHILS NFR BLD AUTO: 72.5 % — SIGNIFICANT CHANGE UP (ref 43–77)
NITRITE UR-MCNC: NEGATIVE — SIGNIFICANT CHANGE UP
NRBC # BLD: 0 /100 WBCS — SIGNIFICANT CHANGE UP (ref 0–0)
NT-PROBNP SERPL-SCNC: 12 PG/ML — SIGNIFICANT CHANGE UP (ref 0–125)
PH UR: 5 — SIGNIFICANT CHANGE UP (ref 5–8)
PLATELET # BLD AUTO: 204 K/UL — SIGNIFICANT CHANGE UP (ref 150–400)
POTASSIUM SERPL-MCNC: 3.1 MMOL/L — LOW (ref 3.5–5.3)
POTASSIUM SERPL-SCNC: 3.1 MMOL/L — LOW (ref 3.5–5.3)
PROT SERPL-MCNC: 6.5 G/DL — SIGNIFICANT CHANGE UP (ref 6–8.3)
PROT UR-MCNC: NEGATIVE MG/DL — SIGNIFICANT CHANGE UP
RBC # BLD: 4.94 M/UL — SIGNIFICANT CHANGE UP (ref 3.8–5.2)
RBC # FLD: 15.5 % — HIGH (ref 10.3–14.5)
SODIUM SERPL-SCNC: 140 MMOL/L — SIGNIFICANT CHANGE UP (ref 135–145)
SP GR SPEC: 1.01 — SIGNIFICANT CHANGE UP (ref 1.01–1.02)
TROPONIN I SERPL-MCNC: 0 NG/ML — LOW (ref 0.02–0.06)
UROBILINOGEN FLD QL: NEGATIVE MG/DL — SIGNIFICANT CHANGE UP
WBC # BLD: 10.06 K/UL — SIGNIFICANT CHANGE UP (ref 3.8–10.5)
WBC # FLD AUTO: 10.06 K/UL — SIGNIFICANT CHANGE UP (ref 3.8–10.5)

## 2020-09-03 PROCEDURE — 74177 CT ABD & PELVIS W/CONTRAST: CPT

## 2020-09-03 PROCEDURE — 71046 X-RAY EXAM CHEST 2 VIEWS: CPT | Mod: 26

## 2020-09-03 PROCEDURE — 80053 COMPREHEN METABOLIC PANEL: CPT

## 2020-09-03 PROCEDURE — 99284 EMERGENCY DEPT VISIT MOD MDM: CPT

## 2020-09-03 PROCEDURE — 99284 EMERGENCY DEPT VISIT MOD MDM: CPT | Mod: 25

## 2020-09-03 PROCEDURE — 36415 COLL VENOUS BLD VENIPUNCTURE: CPT

## 2020-09-03 PROCEDURE — 81025 URINE PREGNANCY TEST: CPT

## 2020-09-03 PROCEDURE — 96375 TX/PRO/DX INJ NEW DRUG ADDON: CPT

## 2020-09-03 PROCEDURE — 93005 ELECTROCARDIOGRAM TRACING: CPT

## 2020-09-03 PROCEDURE — 83880 ASSAY OF NATRIURETIC PEPTIDE: CPT

## 2020-09-03 PROCEDURE — 96374 THER/PROPH/DIAG INJ IV PUSH: CPT | Mod: XU

## 2020-09-03 PROCEDURE — 74177 CT ABD & PELVIS W/CONTRAST: CPT | Mod: 26

## 2020-09-03 PROCEDURE — 93010 ELECTROCARDIOGRAM REPORT: CPT

## 2020-09-03 PROCEDURE — 84484 ASSAY OF TROPONIN QUANT: CPT

## 2020-09-03 PROCEDURE — 81001 URINALYSIS AUTO W/SCOPE: CPT

## 2020-09-03 PROCEDURE — 71046 X-RAY EXAM CHEST 2 VIEWS: CPT

## 2020-09-03 PROCEDURE — 83690 ASSAY OF LIPASE: CPT

## 2020-09-03 PROCEDURE — 85027 COMPLETE CBC AUTOMATED: CPT

## 2020-09-03 RX ORDER — IOHEXOL 300 MG/ML
30 INJECTION, SOLUTION INTRAVENOUS ONCE
Refills: 0 | Status: COMPLETED | OUTPATIENT
Start: 2020-09-03 | End: 2020-09-03

## 2020-09-03 RX ORDER — ONDANSETRON 8 MG/1
1 TABLET, FILM COATED ORAL
Qty: 9 | Refills: 0
Start: 2020-09-03 | End: 2020-09-05

## 2020-09-03 RX ORDER — SODIUM CHLORIDE 9 MG/ML
1000 INJECTION INTRAMUSCULAR; INTRAVENOUS; SUBCUTANEOUS ONCE
Refills: 0 | Status: COMPLETED | OUTPATIENT
Start: 2020-09-03 | End: 2020-09-03

## 2020-09-03 RX ORDER — ONDANSETRON 8 MG/1
4 TABLET, FILM COATED ORAL ONCE
Refills: 0 | Status: COMPLETED | OUTPATIENT
Start: 2020-09-03 | End: 2020-09-03

## 2020-09-03 RX ORDER — POTASSIUM CHLORIDE 20 MEQ
10 PACKET (EA) ORAL ONCE
Refills: 0 | Status: COMPLETED | OUTPATIENT
Start: 2020-09-03 | End: 2020-09-03

## 2020-09-03 RX ORDER — FAMOTIDINE 10 MG/ML
20 INJECTION INTRAVENOUS ONCE
Refills: 0 | Status: COMPLETED | OUTPATIENT
Start: 2020-09-03 | End: 2020-09-03

## 2020-09-03 RX ADMIN — FAMOTIDINE 20 MILLIGRAM(S): 10 INJECTION INTRAVENOUS at 15:19

## 2020-09-03 RX ADMIN — ONDANSETRON 4 MILLIGRAM(S): 8 TABLET, FILM COATED ORAL at 15:19

## 2020-09-03 RX ADMIN — Medication 100 MILLIEQUIVALENT(S): at 15:57

## 2020-09-03 RX ADMIN — IOHEXOL 30 MILLILITER(S): 300 INJECTION, SOLUTION INTRAVENOUS at 15:19

## 2020-09-03 RX ADMIN — SODIUM CHLORIDE 500 MILLILITER(S): 9 INJECTION INTRAMUSCULAR; INTRAVENOUS; SUBCUTANEOUS at 15:19

## 2020-09-03 NOTE — ED PROVIDER NOTE - PATIENT PORTAL LINK FT
You can access the FollowMyHealth Patient Portal offered by [facility] by registering at the following website: [email or web address]. By joining [email or web address]’s FollowMyHealth portal, you will also be able to view your health information using other applications (apps) compatible with our system.

## 2020-09-03 NOTE — ED PROVIDER NOTE - OBJECTIVE STATEMENT
53 y/o F with hx of Asthma  Benign Essential Hypertension  GERD (Gastroesophageal Reflux Disease)  HTN (hypertension)  MVP (mitral valve prolapse)    Right-sided carotid artery disease presents with c/o palpitations, nausea, L side abdominal pain, generalized weakness x 2 days. Pt states that she was seen for asthma a week ago and given an inhaler then. States that she has intermittent LLQ pain for 2 days. Admits to intermittent bloody stools but states that it it related to hemorrhoids. Denies V/D, fever, chills, CP, SOB, headache, dizziness, numbness, tingling, focal weakness, recent travel, sick contacts, dysuria/frequency/hematuria, cough or other symptoms.

## 2020-09-03 NOTE — ED ADULT NURSE REASSESSMENT NOTE - NS ED NURSE REASSESS COMMENT FT1
pt comfortable and tolerated contrast no s/s allergic reaction pt without nausea and no vomiting  potassium riders started and pt tolerating
pt feels better and no vomiting since arrival pt re evaluated by md and to be d'c/d  iv d'c/d  no s/s infiltration upon removal  pt discharged stable and ambulatory in nad at present .d/c instruction reinforced and pt verbalized understanding vital signs as charted. pt advised can go home and set up account on patient portal where he will be able to access labs and test results and chart at any time to share with his doctors. pt given referrals from [facility] mandated for information on substance abuse  information and resources as well as information on depression and resources and office of mental health and  pt verbalized understanding

## 2020-09-03 NOTE — ED PROVIDER NOTE - CARE PROVIDER_API CALL
YOUR PMD,   Phone: (   )    -  Fax: (   )    -  Follow Up Time:     Brett Khalil  CARDIOVASCULAR DISEASE  [address], [place] [address]  Phone: [phone]  Fax: [phone]  Follow Up Time: 1-3 Days Brett Khalil  CARDIOVASCULAR DISEASE  [address], [address]  [place] [address]  Phone: [phone]  Fax: [phone]  Follow Up Time: 1-3 Days    YOUR PMD,   Phone: (   )    -  Fax: (   )    -  Follow Up Time:     Jagdish Davila (DO)  Internal Medicine  [address] [place] [address]  Phone: [phone]  Fax: [phone]  Follow Up Time: 1-3 Days

## 2020-09-03 NOTE — ED PROVIDER NOTE - NSFOLLOWUPINSTRUCTIONS_ED_ALL_ED_FT
Follow up with your PMD in 1-2 days for re-evaluation, ongoing care and treatment. Follow up with your cardiologist and gastroenterologist as discussed. If having worsening of symptoms or other related symptoms, RETURN TO THE ER IMMEDIATELY.

## 2020-09-03 NOTE — ED PROVIDER NOTE - PROGRESS NOTE DETAILS
Scribe IN for Dr. Martin: 53 y/o female with PMHx of HTN, MVP, GERD, right-sided CAD, Hx of IUD presents to the ED c/o palpitations and nausea x2 days. Pt was at [facility] ED for asthma x1 week ago, and was given an inhaler. Pt currently endorses left sided abd pain x2 days, diaphoresis, weakness, nausea, palpitations. Pt takes ASA everyday. Pt does have some bloody stools, but states it's associated with existing hemorrhoids. PCP: Arturo, Hx of IUD.  PE: Vitals normal, afebrile, no acute distress. Heart and lungs normal, abd obese, soft, non-tender, no mass or HSM. no CVA tenderness. Extremities, no edema.  IMPRESSION: Left abd pain, r/u diverticulitis. PLAN: Labs, urine, CT abd. Reevaluated patient at bedside.  Patient feeling much improved.  Discussed the results of all diagnostic testing in ED and copies of all reports given. Advised follow up with cardiologist and GI. Rx for zofran sent. Informed about left basilar opacity on CXR, pt states that she is aware of the findings and pcp has performed imaging for the same and is under observation. An opportunity to ask questions was given.  Discussed the importance of prompt, close medical follow-up.  Patient will return with any changes, concerns or persistent / worsening symptoms.  Understanding of all instructions verbalized.

## 2020-09-03 NOTE — ED PROVIDER NOTE - PROVIDER TOKENS
FREE:[LAST:[YOUR PMD],PHONE:[(   )    -],FAX:[(   )    -]],PROVIDER:[TOKEN:[[id number]],FOLLOWUP:[1-3 Days]] PROVIDER:[TOKEN:[[id number]],FOLLOWUP:[1-3 Days]],FREE:[LAST:[YOUR PMD],PHONE:[(   )    -],FAX:[(   )    -]],PROVIDER:[TOKEN:[[id number]],FOLLOWUP:[1-3 Days]]

## 2020-09-03 NOTE — ED ADULT NURSE NOTE - NSIMPLEMENTINTERV_GEN_ALL_ED
Implemented All Universal Safety Interventions:  [place] to call system. Call bell, personal items and telephone within reach. Instruct patient to call for assistance. Room bathroom lighting operational. Non-slip footwear when patient is off stretcher. Physically safe environment: no spills, clutter or unnecessary equipment. Stretcher in lowest position, wheels locked, appropriate side rails in place.

## 2020-09-03 NOTE — ED ADULT NURSE NOTE - OBJECTIVE STATEMENT
abd pains sudden onset today luq region c/o nausea no vomiting or fever or diarrhea  pt has had ct scans in past and has drank contrast without problem

## 2020-09-03 NOTE — ED PROVIDER NOTE - CLINICAL SUMMARY MEDICAL DECISION MAKING FREE TEXT BOX
55 y/o F c/o palpitations, generalized weakness, LLQ pain and nausea x 2 days. no f/c/v/d/cp/sob, VSS, +mild LLQ ttp, lungs cta B, will get ekg, cxr, labs, ct abdomen and pelvis r/o diverticulitis, reassess

## 2020-09-03 NOTE — ED ADULT NURSE NOTE - CHPI ED NUR SYMPTOMS NEG
no blood in stool/no vomiting/no abdominal distension/no chills/no hematuria/no fever/no diarrhea/no burning urination/no dysuria

## 2020-09-03 NOTE — ED PROVIDER NOTE - CONSTITUTIONAL, MLM
normal... Obese, well appearing, awake, alert, oriented to person, place, time/situation and in no apparent distress.

## 2020-09-09 ENCOUNTER — EMERGENCY (EMERGENCY)
Facility: HOSPITAL | Age: 55
LOS: 1 days | Discharge: HOME | End: 2020-09-09
Attending: EMERGENCY MEDICINE | Admitting: EMERGENCY MEDICINE
Payer: COMMERCIAL

## 2020-09-09 VITALS
TEMPERATURE: 99 F | HEIGHT: 64 IN | SYSTOLIC BLOOD PRESSURE: 137 MMHG | DIASTOLIC BLOOD PRESSURE: 88 MMHG | OXYGEN SATURATION: 100 % | RESPIRATION RATE: 19 BRPM | WEIGHT: 285.94 LBS | HEART RATE: 87 BPM

## 2020-09-09 VITALS
SYSTOLIC BLOOD PRESSURE: 105 MMHG | HEART RATE: 76 BPM | OXYGEN SATURATION: 98 % | DIASTOLIC BLOOD PRESSURE: 65 MMHG | TEMPERATURE: 98 F | RESPIRATION RATE: 16 BRPM

## 2020-09-09 LAB
ALBUMIN SERPL ELPH-MCNC: 3.2 G/DL — LOW (ref 3.3–5)
ALP SERPL-CCNC: 81 U/L — SIGNIFICANT CHANGE UP (ref 30–120)
ALT FLD-CCNC: 26 U/L DA — SIGNIFICANT CHANGE UP (ref 10–60)
ANION GAP SERPL CALC-SCNC: 3 MMOL/L — LOW (ref 5–17)
AST SERPL-CCNC: 8 U/L — LOW (ref 10–40)
BASOPHILS # BLD AUTO: 0.04 K/UL — SIGNIFICANT CHANGE UP (ref 0–0.2)
BASOPHILS NFR BLD AUTO: 0.4 % — SIGNIFICANT CHANGE UP (ref 0–2)
BILIRUB SERPL-MCNC: 0.5 MG/DL — SIGNIFICANT CHANGE UP (ref 0.2–1.2)
BUN SERPL-MCNC: 14 MG/DL — SIGNIFICANT CHANGE UP (ref 7–23)
CALCIUM SERPL-MCNC: 9.3 MG/DL — SIGNIFICANT CHANGE UP (ref 8.4–10.5)
CHLORIDE SERPL-SCNC: 103 MMOL/L — SIGNIFICANT CHANGE UP (ref 96–108)
CK SERPL-CCNC: 23 U/L — LOW (ref 26–192)
CK SERPL-CCNC: 26 U/L — SIGNIFICANT CHANGE UP (ref 26–192)
CO2 SERPL-SCNC: 35 MMOL/L — HIGH (ref 22–31)
CREAT SERPL-MCNC: 0.98 MG/DL — SIGNIFICANT CHANGE UP (ref 0.5–1.3)
D DIMER BLD IA.RAPID-MCNC: 167 NG/ML DDU — SIGNIFICANT CHANGE UP
EOSINOPHIL # BLD AUTO: 0.12 K/UL — SIGNIFICANT CHANGE UP (ref 0–0.5)
EOSINOPHIL NFR BLD AUTO: 1.2 % — SIGNIFICANT CHANGE UP (ref 0–6)
GLUCOSE SERPL-MCNC: 169 MG/DL — HIGH (ref 70–99)
HCT VFR BLD CALC: 38 % — SIGNIFICANT CHANGE UP (ref 34.5–45)
HGB BLD-MCNC: 11.7 G/DL — SIGNIFICANT CHANGE UP (ref 11.5–15.5)
IMM GRANULOCYTES NFR BLD AUTO: 0.3 % — SIGNIFICANT CHANGE UP (ref 0–1.5)
LIDOCAIN IGE QN: 79 U/L — SIGNIFICANT CHANGE UP (ref 73–393)
LYMPHOCYTES # BLD AUTO: 1.87 K/UL — SIGNIFICANT CHANGE UP (ref 1–3.3)
LYMPHOCYTES # BLD AUTO: 18.5 % — SIGNIFICANT CHANGE UP (ref 13–44)
MCHC RBC-ENTMCNC: 25.6 PG — LOW (ref 27–34)
MCHC RBC-ENTMCNC: 30.8 GM/DL — LOW (ref 32–36)
MCV RBC AUTO: 83.2 FL — SIGNIFICANT CHANGE UP (ref 80–100)
MONOCYTES # BLD AUTO: 0.5 K/UL — SIGNIFICANT CHANGE UP (ref 0–0.9)
MONOCYTES NFR BLD AUTO: 4.9 % — SIGNIFICANT CHANGE UP (ref 2–14)
NEUTROPHILS # BLD AUTO: 7.55 K/UL — HIGH (ref 1.8–7.4)
NEUTROPHILS NFR BLD AUTO: 74.7 % — SIGNIFICANT CHANGE UP (ref 43–77)
NRBC # BLD: 0 /100 WBCS — SIGNIFICANT CHANGE UP (ref 0–0)
PLATELET # BLD AUTO: 205 K/UL — SIGNIFICANT CHANGE UP (ref 150–400)
POTASSIUM SERPL-MCNC: 3.9 MMOL/L — SIGNIFICANT CHANGE UP (ref 3.5–5.3)
POTASSIUM SERPL-SCNC: 3.9 MMOL/L — SIGNIFICANT CHANGE UP (ref 3.5–5.3)
PROT SERPL-MCNC: 6.8 G/DL — SIGNIFICANT CHANGE UP (ref 6–8.3)
RBC # BLD: 4.57 M/UL — SIGNIFICANT CHANGE UP (ref 3.8–5.2)
RBC # FLD: 15.9 % — HIGH (ref 10.3–14.5)
SODIUM SERPL-SCNC: 141 MMOL/L — SIGNIFICANT CHANGE UP (ref 135–145)
TROPONIN I SERPL-MCNC: 0 NG/ML — LOW (ref 0.02–0.06)
TROPONIN I SERPL-MCNC: 0 NG/ML — LOW (ref 0.02–0.06)
WBC # BLD: 10.11 K/UL — SIGNIFICANT CHANGE UP (ref 3.8–10.5)
WBC # FLD AUTO: 10.11 K/UL — SIGNIFICANT CHANGE UP (ref 3.8–10.5)

## 2020-09-09 PROCEDURE — 74174 CTA ABD&PLVS W/CONTRAST: CPT | Mod: 26

## 2020-09-09 PROCEDURE — 71046 X-RAY EXAM CHEST 2 VIEWS: CPT | Mod: 26

## 2020-09-09 PROCEDURE — 71275 CT ANGIOGRAPHY CHEST: CPT | Mod: 26

## 2020-09-09 PROCEDURE — 99285 EMERGENCY DEPT VISIT HI MDM: CPT

## 2020-09-09 PROCEDURE — 74174 CTA ABD&PLVS W/CONTRAST: CPT

## 2020-09-09 PROCEDURE — 36415 COLL VENOUS BLD VENIPUNCTURE: CPT

## 2020-09-09 PROCEDURE — 71275 CT ANGIOGRAPHY CHEST: CPT

## 2020-09-09 PROCEDURE — 93010 ELECTROCARDIOGRAM REPORT: CPT

## 2020-09-09 PROCEDURE — 85379 FIBRIN DEGRADATION QUANT: CPT

## 2020-09-09 PROCEDURE — 71046 X-RAY EXAM CHEST 2 VIEWS: CPT

## 2020-09-09 PROCEDURE — 93005 ELECTROCARDIOGRAM TRACING: CPT

## 2020-09-09 PROCEDURE — 82550 ASSAY OF CK (CPK): CPT

## 2020-09-09 PROCEDURE — 80053 COMPREHEN METABOLIC PANEL: CPT

## 2020-09-09 PROCEDURE — 84484 ASSAY OF TROPONIN QUANT: CPT

## 2020-09-09 PROCEDURE — 99284 EMERGENCY DEPT VISIT MOD MDM: CPT | Mod: 25

## 2020-09-09 PROCEDURE — 83690 ASSAY OF LIPASE: CPT

## 2020-09-09 PROCEDURE — 85025 COMPLETE CBC W/AUTO DIFF WBC: CPT

## 2020-09-09 RX ORDER — ASPIRIN/CALCIUM CARB/MAGNESIUM 324 MG
324 TABLET ORAL ONCE
Refills: 0 | Status: DISCONTINUED | OUTPATIENT
Start: 2020-09-09 | End: 2020-09-09

## 2020-09-09 NOTE — ED ADULT NURSE NOTE - OBJECTIVE STATEMENT
Pt came in for nausea and chest pain started this morning after breakfast. Pt denies any SOB No vomiting No abdominal  pain.

## 2020-09-09 NOTE — ED ADULT NURSE REASSESSMENT NOTE - NS ED NURSE REASSESS COMMENT FT1
Patient  revisited and reevaluated. Pt remain stable. Pt seen and examined by Dr Khalil Pt updated with the plan of care. Pt verbalized understanding.

## 2020-09-09 NOTE — ED ADULT NURSE NOTE - NS ED NURSE LEVEL OF CONSCIOUSNESS AFFECT
01/30/17 1731   Final Note   Assessment Type Final Discharge Note   Discharge Disposition Home-Health   Discharge planning education complete? Yes   Hospital Follow Up  Appt(s) scheduled? Yes   Discharge plans and expectations educations in teach back method with documentation complete? Yes   Offered [facility] -- Bedside Delivery? Yes   Referral to Outpatient Case Management complete? n/a   Referral to / orders for Home Health Complete? Yes   30 day supply of medicines given at discharge, if documented non-compliance / non-adherence? n/a   Any social issues identified prior to discharge? No   Did you assess the readiness or willingness of the family or caregiver to support self management of care? Yes   Right Care Referral Info   Post Acute Recommendation Home-care   Referral Type [facility]      Appropriate

## 2020-09-09 NOTE — ED PROVIDER NOTE - PATIENT PORTAL LINK FT
You can access the FollowMyHealth Patient Portal offered by [facility] by registering at the following website: [email or web address]. By joining [employer]’s FollowMyHealth portal, you will also be able to view your health information using other applications (apps) compatible with our system.

## 2020-09-09 NOTE — ED PROVIDER NOTE - CARE PROVIDER_API CALL
Brett Khalil  CARDIOVASCULAR DISEASE  [address]  [place] [address]  Phone: [phone]  Fax: [phone]  Follow Up Time:     Jeni Ha  INTERNAL MEDICINE  [address] [place] [address]  Phone: [phone]  Fax: [phone]  Follow Up Time:

## 2020-09-09 NOTE — CONSULT NOTE ADULT - SUBJECTIVE AND OBJECTIVE BOX
History of Present Illness: The patient is a 54 year old female with a history of asthma, mitral valve prolapse, carotid stenosis who presents with chest pain. She states after breakfast she was sitting down when she had sudden onset left-sided chest discomfort described as a pressure. There was also right-sided back pain. There was some associated shortness of breath and palpitations. She took an aspirin and came to ED. She states around 7-8 months ago she had a stress test which was normal.    Past Medical/Surgical History:  Asthma, mitral valve prolapse, carotid stenosis    Medications:  Home Medications:  Aspirin Enteric Coated 81 mg oral delayed release tablet: 1 tab(s) orally once a day (09 Sep 2020 10:23)  atorvastatin 40 mg oral tablet: 1 tab(s) orally once a day (at bedtime) (09 Sep 2020 10:23)  iron: 1 tab(s) orally once a day (09 Sep 2020 10:23)  losartan 100 mg oral tablet: 1 tab(s) orally once a day (09 Sep 2020 10:23)  nabumetone 750 mg oral tablet: 1 tab(s) orally 2 times a day (09 Sep 2020 10:23)  ProAir HFA 90 mcg/inh inhalation aerosol: 2 puff(s) inhaled 4 times a day, As Needed (09 Sep 2020 10:23)  propranolol 20 mg oral tablet: 1 tab(s) orally 2 times a day (09 Sep 2020 10:23)  Symbicort 160 mcg-4.5 mcg/inh inhalation aerosol: 2 puff(s) inhaled 2 times a day (09 Sep 2020 10:23)      Family History: Non-contributory family history of premature cardiovascular atherosclerotic disease    Social History: No tobacco, alcohol or drug use    Review of Systems:  General: No fevers, chills, weight loss or gain  Skin: No rashes, color changes  Cardiovascular: No chest pain, orthopnea  Respiratory: No shortness of breath, cough  Gastrointestinal: No nausea, abdominal pain  Genitourinary: No incontinence, pain with urination  Musculoskeletal: No pain, swelling, decreased range of motion  Neurological: No headache, weakness  Psychiatric: No depression, anxiety  Endocrine: No weight loss or gain, increased thirst  All other systems are comprehensively negative.    Physical Exam:  Vitals:        Vital Signs Last 24 Hrs  T(C): 37.2 (09 Sep 2020 10:15), Max: 37.2 (09 Sep 2020 10:15)  T(F): 99 (09 Sep 2020 10:15), Max: 99 (09 Sep 2020 10:15)  HR: 87 (09 Sep 2020 10:15) (87 - 87)  BP: 137/88 (09 Sep 2020 10:15) (137/88 - 137/88)  BP(mean): --  RR: 19 (09 Sep 2020 10:15) (19 - 19)  SpO2: 100% (09 Sep 2020 10:15) (100% - 100%)  General: NAD  HEENT: MMM  Neck: No JVD, no carotid bruit  Lungs: CTAB  CV: RRR, nl S1/S2, no M/R/G  Abdomen: S/NT/ND, +BS  Extremities: No LE edema, no cyanosis  Neuro: AAOx3, non-focal  Skin: No rash    Labs:                        11.7   10.11 )-----------( 205      ( 09 Sep 2020 11:20 )             38.0                   ECG: NSR, normal axis, no ST abnormality

## 2020-09-09 NOTE — ED ADULT TRIAGE NOTE - CHIEF COMPLAINT QUOTE
"I am having chest pains that started when having breakfast. The pains went to my back. I took my aspirin and my propranolol but then felt dizzy."

## 2020-09-09 NOTE — ED PROVIDER NOTE - PROGRESS NOTE DETAILS
Pt seen by Dr MARIO Khalil - check ce 2p, outpt fu if labs ok At length discussion with patient regarding nature of the patient's symptoms. Discussed results of all diagnostic testing in ED. Discussed chest pain, Shortness of breath, Dizziness, syncope /  near syncope precautions and instructions. Patient demonstrates understanding that a cardiac cause of the symptoms has not been totally excluded, but at this time there is no evidence to warrant an inpatient workup.  Discussed the importance of continued follow-up with Cardiology as outpatient to complete workup.

## 2020-09-09 NOTE — ED PROVIDER NOTE - CARE PROVIDERS DIRECT ADDRESSES
,DirectAddress_Unknown,[email or web address]

## 2020-09-09 NOTE — ED ADULT NURSE NOTE - NSIMPLEMENTINTERV_GEN_ALL_ED
Implemented All Universal Safety Interventions:  [place] to call system. Call bell, personal items and telephone within reach. Instruct patient to call for assistance. Room bathroom lighting operational. Non-slip footwear when patient is off stretcher. Physically safe environment: no spills, clutter or unnecessary equipment. Stretcher in lowest position, wheels locked, appropriate side rails in place.

## 2020-09-09 NOTE — ED ADULT TRIAGE NOTE - MEANS OF ARRIVAL
History and Physical (focused and Comprehensive)    Carmelita Casarez  1962    Chief Complaint (with details of present illness): Painless, progressive loss of vision consistent with Nuclear Sclerotic Cataract Right  eye.     History (past and present):  Past Medical History:   Diagnosis Date   • Arthritis    • Breast cancer (CMS/HCC)     breast = left breast   • Cataract    • Graves disease    • High cholesterol        Surgeries:  Past Surgical History:   Procedure Laterality Date   • Colonoscopy     • Foot/toes surgery proc unlisted Bilateral     Unspecified foot/toes procedure  x5 total   • Mastectomy partial Left     Breast Lumpectomy   • Rotator cuff repair Left        Prior to Admission medications    Medication Sig Start Date End Date Taking? Authorizing Provider   anastrozole (ARIMIDEX) 1 MG tablet Take 1 mg by mouth daily.   Yes Outside Provider   levothyroxine (SYNTHROID, LEVOTHROID) 150 MCG tablet Take 150 mcg by mouth daily.   Yes Outside Provider   atorvastatin (LIPITOR) 40 MG tablet Take 40 mg by mouth daily.   Yes Outside Provider   moxifloxacin (VIGAMOX) 0.5 % ophthalmic solution Place 1 drop into right eye 3 times daily. Start 3 days prior to surgery and bring to surgery 3/16/18  Yes Surekha Raygoza MD   nepafenac (NEVANAC) 0.1 % ophthalmic suspension Place 1 drop into right eye 3 times daily. 3/16/18  Yes Surekha Raygoza MD   propranolol (INDERAL) 20 MG tablet Take 20 mg by mouth 3 times daily.   Yes Outside Provider   prednisoLONE acetate (PRED FORTE, OMNIPRED) 1 % ophthalmic suspension Place 1 drop into right eye 3 times daily. Start drops AFTER surgery and continue until gone. 3/16/18   Surekha Raygoza MD       ALLERGIES:   Allergen Reactions   • Vicodin [Hydrocodone-Acetaminophen] PRURITUS     itchy       Physical Exam:  Heent:  Right  eye: VA: 20/ 40-2    SPH: 20/20  BAT: 20/80-1  Lens: 2+Milky  Nuclear sclerosis  Teeth: deferred  Neck: deferred  Abdomen: deferred  Back:  deferred  Extremities: deferred  Genitalia: deferred                 Comprehensive: N/A  Family History: Non contributory  Social History: Non contributory    Impression:  Visually significant Nuclear Sclerotic Cataract Right  eye      Risks, benefits and alternatives discussed with the patient.     Planned course of action:  Cataract extraction with IOL Right  eye    Surekha Raygoza MD  4/11/2018     ambulatory

## 2020-09-09 NOTE — CONSULT NOTE ADULT - ASSESSMENT
The patient is a 54 year old female with a history of asthma, mitral valve prolapse, carotid stenosis who presents with chest pain.    Plan:  - Chest pain is atypical and there is some tenderness on exam making a musculoskeletal etiology more likely  - ECG with no evidence of ischemia or infarction  - Rule out an acute MI with two sets of cardiac enzymes  - D-dimer negative making PE unlikely  - CTA chest ordered to r/o dissection  - If above work-up negative, patient can be discharged from a cardiac perspective and follow-up as outpatient

## 2020-09-09 NOTE — ED PROVIDER NOTE - OBJECTIVE STATEMENT
53 yo F p/w co sudden onset chest heaviness rad to back - onset at rest. No numb/ting/focal weak. No rad to arms / legs. Pt with mild assoc dyspnea. Some assoc palpitations. Pt states pain now markedly improved - only minimal discomfort now. onset ~ 1.5 hrs pta. No recent HAILE / easy fatigue. Some gen fatigue noted yesterday however. No other cp, no exertional sx past few weeks. No recent travel / sick contacts. No known COVID infxn / recent exposure. NO agg/allev factors. NO other inj or co.

## 2020-09-09 NOTE — ED PROVIDER NOTE - MUSCULOSKELETAL, MLM
Spine appears normal, range of motion is not limited, no muscle or joint tenderness, no c/c/e. no calf tend / swelling.

## 2020-09-19 ENCOUNTER — EMERGENCY (EMERGENCY)
Facility: HOSPITAL | Age: 55
LOS: 1 days | Discharge: HOME | End: 2020-09-19
Attending: EMERGENCY MEDICINE | Admitting: EMERGENCY MEDICINE
Payer: COMMERCIAL

## 2020-09-19 VITALS
SYSTOLIC BLOOD PRESSURE: 160 MMHG | WEIGHT: 281.97 LBS | HEIGHT: 64 IN | RESPIRATION RATE: 19 BRPM | OXYGEN SATURATION: 97 % | TEMPERATURE: 99 F | HEART RATE: 102 BPM | DIASTOLIC BLOOD PRESSURE: 102 MMHG

## 2020-09-19 VITALS
DIASTOLIC BLOOD PRESSURE: 89 MMHG | OXYGEN SATURATION: 98 % | TEMPERATURE: 98 F | HEART RATE: 88 BPM | SYSTOLIC BLOOD PRESSURE: 155 MMHG | RESPIRATION RATE: 16 BRPM

## 2020-09-19 LAB
ALBUMIN SERPL ELPH-MCNC: 3.3 G/DL — SIGNIFICANT CHANGE UP (ref 3.3–5)
ALP SERPL-CCNC: 88 U/L — SIGNIFICANT CHANGE UP (ref 30–120)
ALT FLD-CCNC: 32 U/L DA — SIGNIFICANT CHANGE UP (ref 10–60)
ANION GAP SERPL CALC-SCNC: 6 MMOL/L — SIGNIFICANT CHANGE UP (ref 5–17)
APPEARANCE UR: CLEAR — SIGNIFICANT CHANGE UP
AST SERPL-CCNC: 18 U/L — SIGNIFICANT CHANGE UP (ref 10–40)
BASOPHILS # BLD AUTO: 0.02 K/UL — SIGNIFICANT CHANGE UP (ref 0–0.2)
BASOPHILS NFR BLD AUTO: 0.2 % — SIGNIFICANT CHANGE UP (ref 0–2)
BILIRUB SERPL-MCNC: 0.4 MG/DL — SIGNIFICANT CHANGE UP (ref 0.2–1.2)
BILIRUB UR-MCNC: ABNORMAL
BUN SERPL-MCNC: 17 MG/DL — SIGNIFICANT CHANGE UP (ref 7–23)
CALCIUM SERPL-MCNC: 9.8 MG/DL — SIGNIFICANT CHANGE UP (ref 8.4–10.5)
CHLORIDE SERPL-SCNC: 101 MMOL/L — SIGNIFICANT CHANGE UP (ref 96–108)
CO2 SERPL-SCNC: 35 MMOL/L — HIGH (ref 22–31)
COLOR SPEC: YELLOW — SIGNIFICANT CHANGE UP
CREAT SERPL-MCNC: 0.93 MG/DL — SIGNIFICANT CHANGE UP (ref 0.5–1.3)
DIFF PNL FLD: ABNORMAL
EOSINOPHIL # BLD AUTO: 0.05 K/UL — SIGNIFICANT CHANGE UP (ref 0–0.5)
EOSINOPHIL NFR BLD AUTO: 0.6 % — SIGNIFICANT CHANGE UP (ref 0–6)
GLUCOSE SERPL-MCNC: 154 MG/DL — HIGH (ref 70–99)
GLUCOSE UR QL: NEGATIVE MG/DL — SIGNIFICANT CHANGE UP
HCT VFR BLD CALC: 38.2 % — SIGNIFICANT CHANGE UP (ref 34.5–45)
HGB BLD-MCNC: 11.7 G/DL — SIGNIFICANT CHANGE UP (ref 11.5–15.5)
IMM GRANULOCYTES NFR BLD AUTO: 0.3 % — SIGNIFICANT CHANGE UP (ref 0–1.5)
KETONES UR-MCNC: ABNORMAL
LEUKOCYTE ESTERASE UR-ACNC: ABNORMAL
LYMPHOCYTES # BLD AUTO: 1.93 K/UL — SIGNIFICANT CHANGE UP (ref 1–3.3)
LYMPHOCYTES # BLD AUTO: 22 % — SIGNIFICANT CHANGE UP (ref 13–44)
MCHC RBC-ENTMCNC: 25.3 PG — LOW (ref 27–34)
MCHC RBC-ENTMCNC: 30.6 GM/DL — LOW (ref 32–36)
MCV RBC AUTO: 82.7 FL — SIGNIFICANT CHANGE UP (ref 80–100)
MONOCYTES # BLD AUTO: 0.47 K/UL — SIGNIFICANT CHANGE UP (ref 0–0.9)
MONOCYTES NFR BLD AUTO: 5.4 % — SIGNIFICANT CHANGE UP (ref 2–14)
NEUTROPHILS # BLD AUTO: 6.28 K/UL — SIGNIFICANT CHANGE UP (ref 1.8–7.4)
NEUTROPHILS NFR BLD AUTO: 71.5 % — SIGNIFICANT CHANGE UP (ref 43–77)
NITRITE UR-MCNC: NEGATIVE — SIGNIFICANT CHANGE UP
NRBC # BLD: 0 /100 WBCS — SIGNIFICANT CHANGE UP (ref 0–0)
PH UR: 6.5 — SIGNIFICANT CHANGE UP (ref 5–8)
PLATELET # BLD AUTO: 302 K/UL — SIGNIFICANT CHANGE UP (ref 150–400)
POTASSIUM SERPL-MCNC: 3.1 MMOL/L — LOW (ref 3.5–5.3)
POTASSIUM SERPL-SCNC: 3.1 MMOL/L — LOW (ref 3.5–5.3)
PROT SERPL-MCNC: 7.3 G/DL — SIGNIFICANT CHANGE UP (ref 6–8.3)
PROT UR-MCNC: 30 MG/DL
RBC # BLD: 4.62 M/UL — SIGNIFICANT CHANGE UP (ref 3.8–5.2)
RBC # FLD: 15.9 % — HIGH (ref 10.3–14.5)
SODIUM SERPL-SCNC: 142 MMOL/L — SIGNIFICANT CHANGE UP (ref 135–145)
SP GR SPEC: 1.02 — SIGNIFICANT CHANGE UP (ref 1.01–1.02)
UROBILINOGEN FLD QL: NEGATIVE MG/DL — SIGNIFICANT CHANGE UP
WBC # BLD: 8.78 K/UL — SIGNIFICANT CHANGE UP (ref 3.8–10.5)
WBC # FLD AUTO: 8.78 K/UL — SIGNIFICANT CHANGE UP (ref 3.8–10.5)

## 2020-09-19 PROCEDURE — 80053 COMPREHEN METABOLIC PANEL: CPT

## 2020-09-19 PROCEDURE — 36415 COLL VENOUS BLD VENIPUNCTURE: CPT

## 2020-09-19 PROCEDURE — 96375 TX/PRO/DX INJ NEW DRUG ADDON: CPT

## 2020-09-19 PROCEDURE — 99284 EMERGENCY DEPT VISIT MOD MDM: CPT | Mod: 25

## 2020-09-19 PROCEDURE — 85025 COMPLETE CBC W/AUTO DIFF WBC: CPT

## 2020-09-19 PROCEDURE — 96365 THER/PROPH/DIAG IV INF INIT: CPT

## 2020-09-19 PROCEDURE — 81001 URINALYSIS AUTO W/SCOPE: CPT

## 2020-09-19 PROCEDURE — 99284 EMERGENCY DEPT VISIT MOD MDM: CPT

## 2020-09-19 PROCEDURE — 96361 HYDRATE IV INFUSION ADD-ON: CPT

## 2020-09-19 RX ORDER — SODIUM CHLORIDE 9 MG/ML
1000 INJECTION INTRAMUSCULAR; INTRAVENOUS; SUBCUTANEOUS ONCE
Refills: 0 | Status: COMPLETED | OUTPATIENT
Start: 2020-09-19 | End: 2020-09-19

## 2020-09-19 RX ORDER — POTASSIUM CHLORIDE 20 MEQ
40 PACKET (EA) ORAL ONCE
Refills: 0 | Status: COMPLETED | OUTPATIENT
Start: 2020-09-19 | End: 2020-09-19

## 2020-09-19 RX ORDER — CEFTRIAXONE 500 MG/1
1000 INJECTION, POWDER, FOR SOLUTION INTRAMUSCULAR; INTRAVENOUS ONCE
Refills: 0 | Status: COMPLETED | OUTPATIENT
Start: 2020-09-19 | End: 2020-09-19

## 2020-09-19 RX ORDER — CEFUROXIME AXETIL 250 MG
1 TABLET ORAL
Qty: 20 | Refills: 0
Start: 2020-09-19 | End: 2020-09-28

## 2020-09-19 RX ORDER — PANTOPRAZOLE SODIUM 20 MG/1
40 TABLET, DELAYED RELEASE ORAL ONCE
Refills: 0 | Status: COMPLETED | OUTPATIENT
Start: 2020-09-19 | End: 2020-09-19

## 2020-09-19 RX ORDER — ONDANSETRON 8 MG/1
4 TABLET, FILM COATED ORAL ONCE
Refills: 0 | Status: COMPLETED | OUTPATIENT
Start: 2020-09-19 | End: 2020-09-19

## 2020-09-19 RX ADMIN — CEFTRIAXONE 1000 MILLIGRAM(S): 500 INJECTION, POWDER, FOR SOLUTION INTRAMUSCULAR; INTRAVENOUS at 13:00

## 2020-09-19 RX ADMIN — SODIUM CHLORIDE 1000 MILLILITER(S): 9 INJECTION INTRAMUSCULAR; INTRAVENOUS; SUBCUTANEOUS at 12:15

## 2020-09-19 RX ADMIN — PANTOPRAZOLE SODIUM 40 MILLIGRAM(S): 20 TABLET, DELAYED RELEASE ORAL at 11:45

## 2020-09-19 RX ADMIN — SODIUM CHLORIDE 1000 MILLILITER(S): 9 INJECTION INTRAMUSCULAR; INTRAVENOUS; SUBCUTANEOUS at 13:15

## 2020-09-19 RX ADMIN — ONDANSETRON 4 MILLIGRAM(S): 8 TABLET, FILM COATED ORAL at 11:35

## 2020-09-19 RX ADMIN — CEFTRIAXONE 100 MILLIGRAM(S): 500 INJECTION, POWDER, FOR SOLUTION INTRAMUSCULAR; INTRAVENOUS at 12:30

## 2020-09-19 RX ADMIN — Medication 40 MILLIEQUIVALENT(S): at 12:03

## 2020-09-19 RX ADMIN — SODIUM CHLORIDE 1000 MILLILITER(S): 9 INJECTION INTRAMUSCULAR; INTRAVENOUS; SUBCUTANEOUS at 11:15

## 2020-09-19 NOTE — ED PROVIDER NOTE - ATTENDING CONTRIBUTION TO CARE
Pt is a 55 yo female who presents to the ED with a cc of possible dehydration.  PMHx of Asthma    Benign Essential Hypertension, GERD (Gastroesophageal Reflux Disease), HTN (hypertension), MVP (mitral valve prolapse), Right-sided carotid artery disease.  Pt reports that she was in her normal state of health yesterday.  She reports that today when she awoke she had one episode of dizziness which she described as the room spinning upon standing up (denies any further episodes of dizziness since). She also noted that her mouth was dry, her skin felt dry, she had some associated nausea, dysuria, and suprapubic pressure. Denies any fevers but reports chills.  Denies V/D/C, CP, SOB, abd pain.  Pt denies cough.  Denies noting gross hematuria. On exam pt lying in bed has received IVF and states that she feels much improved, dry MM noted, heart RR, lungs CTA, abd soft NT/ND although obesity limits exam.  Pt with s/s concerning for possible dehydration in the setting of UTI.  Will obtain screening labs, hydrate and check UA/ culture.  Will monitor

## 2020-09-19 NOTE — ED PROVIDER NOTE - PATIENT PORTAL LINK FT
You can access the FollowMyHealth Patient Portal offered by Health system by registering at the following website: [email or web address]. By joining [employer]’s FollowMyHealth portal, you will also be able to view your health information using other applications (apps) compatible with our system.

## 2020-09-19 NOTE — ED PROVIDER NOTE - OBJECTIVE STATEMENT
54 y female presents with symptoms of dehydration, states her she has noted dry mouth, dry skin, nausea, dehydration symptoms x 1 month, she saw her PMD Dr Kiko Morris, saw him few weeks, ago, he gave her a prescription for blood work, she never had that done.  denies chest pain, sob, nvd, cough, fever, abdominal pain.  nonsmoker, no etoh , states she takes atorvastatin  PMH:  Asthma    Benign Essential Hypertension    GERD (Gastroesophageal Reflux Disease)    HTN (hypertension)    MVP (mitral valve prolapse)    Right-sided carotid artery disease 54 y female presents with symptoms of dehydration, states she has noted dry mouth, dry skin, nausea, dehydration symptoms x 1 month, she saw her PMD Dr Kiko Morris, saw him few weeks, ago, he gave her a prescription for blood work, she never had that done.  denies chest pain, sob, nvd, cough, fever, abdominal pain.  nonsmoker, no etoh , states she takes atorvastatin  PMH:  Asthma    Benign Essential Hypertension    GERD (Gastroesophageal Reflux Disease)    HTN (hypertension)    MVP (mitral valve prolapse)    Right-sided carotid artery disease 54 y female presents with symptoms of dehydration, states she has noted dry mouth, dry skin, nausea, dehydration symptoms x 1 month, she saw her PMD Dr Kiko Morris, saw him few weeks, ago, he gave her a prescription for blood work, she never had that done.  denies chest pain, sob, nvd, cough, fever, abdominal pain.  nonsmoker, no etoh , states she took propanolol and asa 30 minutes ago  PMH:  Asthma    Benign Essential Hypertension    GERD (Gastroesophageal Reflux Disease)    HTN (hypertension)    MVP (mitral valve prolapse)    Right-sided carotid artery disease

## 2020-09-19 NOTE — ED ADULT NURSE NOTE - OBJECTIVE STATEMENT
Feels dry and has dry lips and frequent urination. Has episodes of "not having saliva". Ongoing for about 1 months.

## 2020-09-19 NOTE — ED PROVIDER NOTE - PROGRESS NOTE DETAILS
patient resting comfortably, results discussed, uti, rx ceftin sent to pharmacy, advised follow up with her pmd Dr carrizales, call monday to arrange follow up, copy of results given, any concerns return to ED

## 2020-09-19 NOTE — ED PROVIDER NOTE - CHPI ED SYMPTOMS NEG
no pain/no loss of consciousness/no fever/no headache/no chills/no decreased eating/drinking/no back pain/no dizziness/no vomiting

## 2020-09-19 NOTE — ED PROVIDER NOTE - GENITOURINARY BLADDER
Patient and family refusing  services at this time. Son present at bedside.   non-distended/non-tender

## 2020-09-19 NOTE — ED PROVIDER NOTE - CARE PROVIDER_API CALL
PMD Dr Morris,   Phone: (   )    -  Fax: (   )    -  Established Patient  Follow Up Time: 1-3 Days

## 2020-09-19 NOTE — ED PROVIDER NOTE - CARE PLAN
Principal Discharge DX:	Dehydration symptoms   Principal Discharge DX:	Dehydration symptoms  Secondary Diagnosis:	UTI (urinary tract infection)

## 2020-09-19 NOTE — ED PROVIDER NOTE - PROVIDER TOKENS
FREE:[LAST:[PMD Dr Morris],PHONE:[(   )    -],FAX:[(   )    -],FOLLOWUP:[1-3 Days],ESTABLISHEDPATIENT:[T]]

## 2020-09-28 ENCOUNTER — EMERGENCY (EMERGENCY)
Facility: HOSPITAL | Age: 55
LOS: 1 days | Discharge: HOME | End: 2020-09-28
Attending: EMERGENCY MEDICINE | Admitting: EMERGENCY MEDICINE
Payer: COMMERCIAL

## 2020-09-28 VITALS
DIASTOLIC BLOOD PRESSURE: 87 MMHG | HEIGHT: 64 IN | SYSTOLIC BLOOD PRESSURE: 145 MMHG | WEIGHT: 281.97 LBS | TEMPERATURE: 99 F | RESPIRATION RATE: 22 BRPM | HEART RATE: 93 BPM | OXYGEN SATURATION: 98 %

## 2020-09-28 VITALS
SYSTOLIC BLOOD PRESSURE: 117 MMHG | TEMPERATURE: 98 F | HEART RATE: 79 BPM | RESPIRATION RATE: 18 BRPM | OXYGEN SATURATION: 98 % | DIASTOLIC BLOOD PRESSURE: 76 MMHG

## 2020-09-28 PROCEDURE — 99283 EMERGENCY DEPT VISIT LOW MDM: CPT

## 2020-09-28 PROCEDURE — 99282 EMERGENCY DEPT VISIT SF MDM: CPT

## 2020-09-28 NOTE — ED PROVIDER NOTE - NSFOLLOWUPINSTRUCTIONS_ED_ALL_ED_FT
Ansiedad    LO QUE NECESITA SABER:    La ansiedad es velia afección que provoca que usted se sienta extremadamente preocupado o nervioso. Los sentimientos son esquivel ino que pueden causar problemas en renu actividades diarias o el sueño. La ansiedad puede desencadenarse por algo que teme o puede ocurrir sin velia causa. El estrés familiar o laboral, fumar, la cafeína y el alcohol pueden aumentar carbajal riesgo para sufrir ansiedad. Ciertos medicamentos o condiciones de vane también pueden aumentar carbajal riesgo. La ansiedad puede convertirse en velia afección de larga duración si no se controla ni se trata.    INSTRUCCIONES SOBRE EL LILLIAM HOSPITALARIA:    Llame al 911 si:  •Usted tiene dolor de pecho, presión o pesadez que pueden llegar a propagarse a renu hombros, brazos, mandíbula, rimma o espalda      •Usted siente deseos de lastimarse o lastimar a alguien más.      Comuníquese con carbajal médico si:  •Renu síntomas empeoran o no mejoran con el tratamiento.      •Carbajal ansiedad vicki que realice renu actividades diarias.      •Tiene nuevos síntomas desde carbajal última consulta.      •Usted tiene preguntas o inquietudes acerca de carbajal condición o cuidado.      Medicamentos:  •Los medicamentospara ayudarle a sentirse más calmado y relajado y disminuir renu síntomas.      •[place] renu medicamentos charisse se le haya indicado.Consulte con carbajal médico si usted korina que carbajal medicamento no le está ayudando o si presenta efectos secundarios. Infórmele si es alérgico a algún medicamento. Mantenga velia lista actualizada de los medicamentos, las vitaminas y los productos herbales que lavonne. Incluya los siguientes datos de los medicamentos: cantidad, frecuencia y motivo de administración. Traiga con usted la lista o los envases de las píldoras a renu citas de seguimiento. Lleve la lista de los medicamentos con usted en marnie de velia emergencia.      Programe velia hemanth con carbajal médico dentro de 2 semanas o charisse se le indique:Anote renu preguntas para que se acuerde de hacerlas hiwot renu visitas.    Maneje la ansiedad:  •Hable con alguien sobre carbajal ansiedad.Carbajal médico puede sugerirle que reciba consejería. La terapia cognitiva conceptual puede ayudarlo a entender y cambiar la forma que usted reacciona a los eventos que provocan renu síntomas. Usted podría sentirse más cómodo hablando con un familiar o amigo sobre carbajal ansiedad. Elija a alguien que usted sepa que será comprensivo y alentador.      •Aprenda a relajarse.Las actividades charisse el ejercicio, meditación o escuchar música pueden ayudarlo a relajarse. Pase tiempo con amigos, o clifford cosas que disfruta.      •Practique la respiración profunda.La respiración profunda puede ayudarlo a relajarse cuando se sienta ansioso. Enfóquese en respirar lento y profundo varias veces al día, o hiwot un ataque de ansiedad. Respire por la nariz y exhale por la boca.      •Korina velia rutina para dormir.El sueño regular puede ayudarlo a sentirse más tranquilo hiwot el día. Vaya a dormirse y levántese a la misma hora todos los días. No donnell televisión ni use el ordenador giovany antes de acostarse. Carbajal habitación debe ser confortable, oscura y silenciosa.      •Consuma alimentos saludables y variados.Los alimentos saludables incluyen frutas, verduras, productos lácteos bajos en grasa, dorie magras, pescado, panes integrales y frijoles cocidos. Los alimentos saludables pueden ayudarlo a sentir menos ansidedad y tener más energía.      •Realice actividad física con regularidad.El ejercicio puede ayudarlo a aumentar carbajal nivel de energía. El ejercicio también puede elevar carbajal estado de ánimo y ayudarlo a dormir mejor. Carbajal médico puede ayudarle a crear un plan de ejercicios.      •No fume.La nicotina y otros químicos en los cigarrillos y cigarros pueden aumentar la ansiedad. Pida información a carbajal médico si usted actualmente fuma y necesita ayuda para dejar de fumar. Los cigarrillos electrónicos o el tabaco sin humo igualmente contienen nicotina. Consulte con carbajal médico antes de utilizar estos productos.      •No consuma cafeína.La cafeína puede empeorar renu síntomas. No consuma alimentos o bebidas que tengan el propósito de aumentar carbajal nivel de energía.      •Limite o no consuma bebidas alcohólicas.Pregúntele a carbajal médico si usted puede calin alcohol. Es posible que usted no pueda ingerir alcohol si lavonne ciertos medicamentos para la ansiedad o la depresión. Limite el consumo de alcohol a 1 trago por día si es froy. Si usted es hombre, limite el consumo de alcohol a 2 tragos por día. Un trago equivale a 12 onzas de cerveza, 5 onzas de vino o 1 onza y ½ de licor.      •No use drogas.Las drogas también pueden agravar la ansiedad. También pueden dificultar el manejo de la ansiedad. Hable con carbajal médico si usted consume drogas y necesita ayuda para dejarlas.

## 2020-09-28 NOTE — ED PROVIDER NOTE - PATIENT PORTAL LINK FT
You can access the FollowMyHealth Patient Portal offered by [facility] by registering at the following website: [email or web address]. By joining [employer]’s FollowMyHealth portal, you will also be able to view your health information using other applications (apps) compatible with our system.

## 2020-09-28 NOTE — ED PROVIDER NOTE - OBJECTIVE STATEMENT
54 y.o. F c/o difficulty breathing, states she was woken by a call from her cousin in [place], that her father there, who has Leukemia, now has Covid, this news frightened 54 y.o. F c/o difficulty breathing, states she was woken by a call from her cousin in [place], that her father there, who has Leukemia, now has Covid, this news frightened her, she started breathing fast and mouth got very dry, took 1 puff of her inhaler without change, her son brought her to ED

## 2020-09-28 NOTE — ED PROVIDER NOTE - CLINICAL SUMMARY MEDICAL DECISION MAKING FREE TEXT BOX
felt sob after hearing news that her father is sick in [place], lungs clear, appears to be panic attack, will let pt rest and reassess

## 2020-09-28 NOTE — ED ADULT NURSE REASSESSMENT NOTE - NS ED NURSE REASSESS COMMENT FT1
pt drank water without difficulty, appears comfortable , VSS, reevaluated by MD, pt d/c home with f/u instructions.

## 2020-09-28 NOTE — ED ADULT NURSE NOTE - NSIMPLEMENTINTERV_GEN_ALL_ED
Implemented All Universal Safety Interventions:  Park to call system. Call bell, personal items and telephone within reach. Instruct patient to call for assistance. Room bathroom lighting operational. Non-slip footwear when patient is off stretcher. Physically safe environment: no spills, clutter or unnecessary equipment. Stretcher in lowest position, wheels locked, appropriate side rails in place.

## 2020-09-28 NOTE — ED ADULT NURSE NOTE - OBJECTIVE STATEMENT
pt walked in c/o unable to swallow saliva, states she woke up with a phone call from [place] that her father who has leukemia contracted covid-19 . pt feels like difficulty breathing and  unable to swallow saliva.

## 2020-10-12 ENCOUNTER — EMERGENCY (EMERGENCY)
Facility: HOSPITAL | Age: 55
LOS: 1 days | Discharge: HOME | End: 2020-10-12
Attending: EMERGENCY MEDICINE | Admitting: EMERGENCY MEDICINE
Payer: COMMERCIAL

## 2020-10-12 VITALS
SYSTOLIC BLOOD PRESSURE: 121 MMHG | RESPIRATION RATE: 24 BRPM | HEIGHT: 64 IN | WEIGHT: 272.05 LBS | DIASTOLIC BLOOD PRESSURE: 80 MMHG | TEMPERATURE: 98 F | HEART RATE: 90 BPM | OXYGEN SATURATION: 100 %

## 2020-10-12 VITALS
RESPIRATION RATE: 20 BRPM | OXYGEN SATURATION: 98 % | DIASTOLIC BLOOD PRESSURE: 64 MMHG | SYSTOLIC BLOOD PRESSURE: 148 MMHG | HEART RATE: 90 BPM

## 2020-10-12 LAB
ALBUMIN SERPL ELPH-MCNC: 3 G/DL — LOW (ref 3.3–5)
ALP SERPL-CCNC: 92 U/L — SIGNIFICANT CHANGE UP (ref 30–120)
ALT FLD-CCNC: 34 U/L DA — SIGNIFICANT CHANGE UP (ref 10–60)
ANION GAP SERPL CALC-SCNC: 8 MMOL/L — SIGNIFICANT CHANGE UP (ref 5–17)
APTT BLD: 32.8 SEC — SIGNIFICANT CHANGE UP (ref 27.5–35.5)
AST SERPL-CCNC: 17 U/L — SIGNIFICANT CHANGE UP (ref 10–40)
BASOPHILS # BLD AUTO: 0.03 K/UL — SIGNIFICANT CHANGE UP (ref 0–0.2)
BASOPHILS NFR BLD AUTO: 0.3 % — SIGNIFICANT CHANGE UP (ref 0–2)
BILIRUB SERPL-MCNC: 0.4 MG/DL — SIGNIFICANT CHANGE UP (ref 0.2–1.2)
BUN SERPL-MCNC: 12 MG/DL — SIGNIFICANT CHANGE UP (ref 7–23)
CALCIUM SERPL-MCNC: 9.5 MG/DL — SIGNIFICANT CHANGE UP (ref 8.4–10.5)
CHLORIDE SERPL-SCNC: 104 MMOL/L — SIGNIFICANT CHANGE UP (ref 96–108)
CO2 SERPL-SCNC: 31 MMOL/L — SIGNIFICANT CHANGE UP (ref 22–31)
CREAT SERPL-MCNC: 0.97 MG/DL — SIGNIFICANT CHANGE UP (ref 0.5–1.3)
EOSINOPHIL # BLD AUTO: 0.09 K/UL — SIGNIFICANT CHANGE UP (ref 0–0.5)
EOSINOPHIL NFR BLD AUTO: 1 % — SIGNIFICANT CHANGE UP (ref 0–6)
GLUCOSE SERPL-MCNC: 148 MG/DL — HIGH (ref 70–99)
HCT VFR BLD CALC: 36.6 % — SIGNIFICANT CHANGE UP (ref 34.5–45)
HGB BLD-MCNC: 11.1 G/DL — LOW (ref 11.5–15.5)
IMM GRANULOCYTES NFR BLD AUTO: 0.4 % — SIGNIFICANT CHANGE UP (ref 0–1.5)
INR BLD: 1.06 RATIO — SIGNIFICANT CHANGE UP (ref 0.88–1.16)
LYMPHOCYTES # BLD AUTO: 1.78 K/UL — SIGNIFICANT CHANGE UP (ref 1–3.3)
LYMPHOCYTES # BLD AUTO: 19.2 % — SIGNIFICANT CHANGE UP (ref 13–44)
MCHC RBC-ENTMCNC: 24.4 PG — LOW (ref 27–34)
MCHC RBC-ENTMCNC: 30.3 GM/DL — LOW (ref 32–36)
MCV RBC AUTO: 80.6 FL — SIGNIFICANT CHANGE UP (ref 80–100)
MONOCYTES # BLD AUTO: 0.46 K/UL — SIGNIFICANT CHANGE UP (ref 0–0.9)
MONOCYTES NFR BLD AUTO: 5 % — SIGNIFICANT CHANGE UP (ref 2–14)
NEUTROPHILS # BLD AUTO: 6.86 K/UL — SIGNIFICANT CHANGE UP (ref 1.8–7.4)
NEUTROPHILS NFR BLD AUTO: 74.1 % — SIGNIFICANT CHANGE UP (ref 43–77)
NRBC # BLD: 0 /100 WBCS — SIGNIFICANT CHANGE UP (ref 0–0)
PLATELET # BLD AUTO: 270 K/UL — SIGNIFICANT CHANGE UP (ref 150–400)
POTASSIUM SERPL-MCNC: 3.1 MMOL/L — LOW (ref 3.5–5.3)
POTASSIUM SERPL-SCNC: 3.1 MMOL/L — LOW (ref 3.5–5.3)
PROT SERPL-MCNC: 7.3 G/DL — SIGNIFICANT CHANGE UP (ref 6–8.3)
PROTHROM AB SERPL-ACNC: 12.8 SEC — SIGNIFICANT CHANGE UP (ref 10.6–13.6)
RBC # BLD: 4.54 M/UL — SIGNIFICANT CHANGE UP (ref 3.8–5.2)
RBC # FLD: 15.2 % — HIGH (ref 10.3–14.5)
SODIUM SERPL-SCNC: 143 MMOL/L — SIGNIFICANT CHANGE UP (ref 135–145)
TROPONIN I SERPL-MCNC: 0 NG/ML — LOW (ref 0.02–0.06)
WBC # BLD: 9.26 K/UL — SIGNIFICANT CHANGE UP (ref 3.8–10.5)
WBC # FLD AUTO: 9.26 K/UL — SIGNIFICANT CHANGE UP (ref 3.8–10.5)

## 2020-10-12 PROCEDURE — 99284 EMERGENCY DEPT VISIT MOD MDM: CPT

## 2020-10-12 PROCEDURE — 71045 X-RAY EXAM CHEST 1 VIEW: CPT

## 2020-10-12 PROCEDURE — 84484 ASSAY OF TROPONIN QUANT: CPT

## 2020-10-12 PROCEDURE — 93005 ELECTROCARDIOGRAM TRACING: CPT

## 2020-10-12 PROCEDURE — 36415 COLL VENOUS BLD VENIPUNCTURE: CPT

## 2020-10-12 PROCEDURE — 70496 CT ANGIOGRAPHY HEAD: CPT

## 2020-10-12 PROCEDURE — 96365 THER/PROPH/DIAG IV INF INIT: CPT | Mod: XU

## 2020-10-12 PROCEDURE — 71045 X-RAY EXAM CHEST 1 VIEW: CPT | Mod: 26

## 2020-10-12 PROCEDURE — 70450 CT HEAD/BRAIN W/O DYE: CPT | Mod: 26,59

## 2020-10-12 PROCEDURE — 70496 CT ANGIOGRAPHY HEAD: CPT | Mod: 26

## 2020-10-12 PROCEDURE — 96361 HYDRATE IV INFUSION ADD-ON: CPT

## 2020-10-12 PROCEDURE — 85025 COMPLETE CBC W/AUTO DIFF WBC: CPT

## 2020-10-12 PROCEDURE — 70498 CT ANGIOGRAPHY NECK: CPT

## 2020-10-12 PROCEDURE — 80053 COMPREHEN METABOLIC PANEL: CPT

## 2020-10-12 PROCEDURE — 96375 TX/PRO/DX INJ NEW DRUG ADDON: CPT

## 2020-10-12 PROCEDURE — 70498 CT ANGIOGRAPHY NECK: CPT | Mod: 26

## 2020-10-12 PROCEDURE — 70450 CT HEAD/BRAIN W/O DYE: CPT

## 2020-10-12 PROCEDURE — 99284 EMERGENCY DEPT VISIT MOD MDM: CPT | Mod: 25

## 2020-10-12 PROCEDURE — 93010 ELECTROCARDIOGRAM REPORT: CPT

## 2020-10-12 PROCEDURE — 85610 PROTHROMBIN TIME: CPT

## 2020-10-12 PROCEDURE — 85730 THROMBOPLASTIN TIME PARTIAL: CPT

## 2020-10-12 PROCEDURE — 82962 GLUCOSE BLOOD TEST: CPT

## 2020-10-12 RX ORDER — ONDANSETRON 8 MG/1
1 TABLET, FILM COATED ORAL
Qty: 10 | Refills: 0
Start: 2020-10-12 | End: 2020-10-14

## 2020-10-12 RX ORDER — ONDANSETRON 8 MG/1
4 TABLET, FILM COATED ORAL ONCE
Refills: 0 | Status: COMPLETED | OUTPATIENT
Start: 2020-10-12 | End: 2020-10-12

## 2020-10-12 RX ORDER — SODIUM CHLORIDE 9 MG/ML
1000 INJECTION INTRAMUSCULAR; INTRAVENOUS; SUBCUTANEOUS ONCE
Refills: 0 | Status: COMPLETED | OUTPATIENT
Start: 2020-10-12 | End: 2020-10-12

## 2020-10-12 RX ORDER — POTASSIUM CHLORIDE 20 MEQ
40 PACKET (EA) ORAL ONCE
Refills: 0 | Status: COMPLETED | OUTPATIENT
Start: 2020-10-12 | End: 2020-10-12

## 2020-10-12 RX ORDER — MECLIZINE HCL 12.5 MG
1 TABLET ORAL
Qty: 20 | Refills: 0
Start: 2020-10-12

## 2020-10-12 RX ORDER — MECLIZINE HCL 12.5 MG
25 TABLET ORAL ONCE
Refills: 0 | Status: COMPLETED | OUTPATIENT
Start: 2020-10-12 | End: 2020-10-12

## 2020-10-12 RX ORDER — ASPIRIN/CALCIUM CARB/MAGNESIUM 324 MG
325 TABLET ORAL ONCE
Refills: 0 | Status: COMPLETED | OUTPATIENT
Start: 2020-10-12 | End: 2020-10-12

## 2020-10-12 RX ADMIN — SODIUM CHLORIDE 1000 MILLILITER(S): 9 INJECTION INTRAMUSCULAR; INTRAVENOUS; SUBCUTANEOUS at 21:45

## 2020-10-12 RX ADMIN — SODIUM CHLORIDE 500 MILLILITER(S): 9 INJECTION INTRAMUSCULAR; INTRAVENOUS; SUBCUTANEOUS at 19:49

## 2020-10-12 RX ADMIN — Medication 325 MILLIGRAM(S): at 20:25

## 2020-10-12 RX ADMIN — ONDANSETRON 4 MILLIGRAM(S): 8 TABLET, FILM COATED ORAL at 21:15

## 2020-10-12 RX ADMIN — Medication 25 MILLIGRAM(S): at 19:53

## 2020-10-12 RX ADMIN — ONDANSETRON 4 MILLIGRAM(S): 8 TABLET, FILM COATED ORAL at 20:00

## 2020-10-12 RX ADMIN — Medication 40 MILLIEQUIVALENT(S): at 20:25

## 2020-10-12 RX ADMIN — ONDANSETRON 104 MILLIGRAM(S): 8 TABLET, FILM COATED ORAL at 20:45

## 2020-10-12 NOTE — ED PROVIDER NOTE - OBJECTIVE STATEMENT
55 y/o female with PMHx of CAD, HTN, Mitral valve prolapse, Asthma, GERD presents to the ED c/o dizziness with associated n/v. States approximately 45 minutes PTA, developed dizziness, described as room-spinning sensation, while in car. Associated dizziness with n/v. No fevers, no change in hearing, no ear pain, has some chronic mild cough associated with asthma, which improves with inhaler. No other URI symptoms recently. Felt weak and shaky in legs, and cold and sweaty. In ED, pt currently feeling weak, feels dizziness and nausea again when sitting up on stretcher.      No other complaints at this time. NKDA. PCP: Kiko Morris

## 2020-10-12 NOTE — ED ADULT NURSE NOTE - OBJECTIVE STATEMENT
I was coming back from [place] and I felt dizzy and nauseous. I vomited once, took Zofran. Denies nausea at this time, denies pain. Felt gas in her stomach. I was coming back from [place] and I felt dizzy and nauseous. States she vomited once, took Zofran. Denies nausea at this time, denies pain. Felt gas in her stomach. States she felt weak and shaky in legs, and cold and sweaty.

## 2020-10-12 NOTE — ED PROVIDER NOTE - CLINICAL SUMMARY MEDICAL DECISION MAKING FREE TEXT BOX
55 y/o female developed vertigo approximately 45 minutes PTA. Likely benign positional. Given pt's comorbidities, will r/o stroke.

## 2020-10-12 NOTE — ED PROVIDER NOTE - PATIENT PORTAL LINK FT
You can access the FollowMyHealth Patient Portal offered by [facility] by registering at the following website: [email or web address]. By joining [employer]’s FollowMyHealth portal, you will also be able to view your health information using other applications (apps) compatible with our system.

## 2020-10-12 NOTE — ED PROVIDER NOTE - CARE PROVIDER_API CALL
Dereje Andrew)  Otolaryngology  [address], [address]  [place] [address]  Phone: [phone]  Fax: [phone]  Follow Up Time: 1-3 Days

## 2020-11-02 PROCEDURE — 99283 EMERGENCY DEPT VISIT LOW MDM: CPT

## 2020-11-03 ENCOUNTER — EMERGENCY (EMERGENCY)
Facility: HOSPITAL | Age: 55
LOS: 1 days | Discharge: HOME | End: 2020-11-03
Attending: EMERGENCY MEDICINE | Admitting: EMERGENCY MEDICINE
Payer: COMMERCIAL

## 2020-11-03 VITALS
RESPIRATION RATE: 18 BRPM | OXYGEN SATURATION: 100 % | SYSTOLIC BLOOD PRESSURE: 128 MMHG | DIASTOLIC BLOOD PRESSURE: 65 MMHG | HEART RATE: 78 BPM

## 2020-11-03 VITALS
SYSTOLIC BLOOD PRESSURE: 130 MMHG | HEIGHT: 64 IN | WEIGHT: 270.95 LBS | OXYGEN SATURATION: 99 % | HEART RATE: 82 BPM | DIASTOLIC BLOOD PRESSURE: 78 MMHG | RESPIRATION RATE: 22 BRPM | TEMPERATURE: 98 F

## 2020-11-03 PROCEDURE — 93010 ELECTROCARDIOGRAM REPORT: CPT

## 2020-11-03 PROCEDURE — 93005 ELECTROCARDIOGRAM TRACING: CPT

## 2020-11-03 PROCEDURE — 99283 EMERGENCY DEPT VISIT LOW MDM: CPT | Mod: 25

## 2020-11-03 RX ADMIN — Medication 1 MILLIGRAM(S): at 02:04

## 2020-11-03 NOTE — ED PROVIDER NOTE - OBJECTIVE STATEMENT
54yo female who presents with anxiety tonite. pt states she got a call from [employer] from her niece who told her that her father had an MI, father has been fighting covid the last 2 weeks, pt started to feel nervous and anxious, feeling weak in her legs with palpitations, no sob. spoke with the  who states the story is that the father passed tonite and the pt has yet to find out, he is asking for some anti anxiety so she can go home and be with her family

## 2020-11-03 NOTE — ED PROVIDER NOTE - PATIENT PORTAL LINK FT
You can access the FollowMyHealth Patient Portal offered by [facility] by registering at the following website: [email or web address]. By joining [employer]’s FollowMyHealth portal, you will also be able to view your health information using other applications (apps) compatible with our system.

## 2020-11-03 NOTE — ED PROVIDER NOTE - NSFOLLOWUPINSTRUCTIONS_ED_ALL_ED_FT
Ansiedad    LO QUE NECESITA SABER:    La ansiedad es velia afección que provoca que usted se sienta extremadamente preocupado o nervioso. Los sentimientos son esquivel ino que pueden causar problemas en renu actividades diarias o el sueño. La ansiedad puede desencadenarse por algo que teme o puede ocurrir sin velia causa. El estrés familiar o laboral, fumar, la cafeína y el alcohol pueden aumentar carbajal riesgo para sufrir ansiedad. Ciertos medicamentos o condiciones de vane también pueden aumentar carbajal riesgo. La ansiedad puede convertirse en velia afección de larga duración si no se controla ni se trata.    INSTRUCCIONES SOBRE EL LILLIAM HOSPITALARIA:    Llame al número de emergencias local (911 en los [place]) si:  •Usted tiene dolor de pecho, presión o pesadez que pueden llegar a propagarse a renu hombros, brazos, mandíbula, rimma o espalda      •Usted siente deseos de lastimarse o lastimar a alguien más.      Llame a carbajal médico si:  •Renu síntomas empeoran o no mejoran con el tratamiento.      •Carbajal ansiedad vicki que realice rneu actividades diarias.      •Tiene nuevos síntomas desde carbajal última consulta.      •Usted tiene preguntas o inquietudes acerca de carbajal condición o cuidado.      Medicamentos:  •Los medicamentospara ayudarle a sentirse más calmado y relajado y disminuir renu síntomas.      •[place] renu medicamentos charisse se le haya indicado.Consulte con carbajal médico si usted korina que carbajal medicamento no le está ayudando o si presenta efectos secundarios. Infórmele si es alérgico a algún medicamento. Mantenga velia lista actualizada de los medicamentos, las vitaminas y los productos herbales que lavonne. Incluya los siguientes datos de los medicamentos: cantidad, frecuencia y motivo de administración. Traiga con usted la lista o los envases de las píldoras a renu citas de seguimiento. Lleve la lista de los medicamentos con usted en marnie de velia emergencia.      Maneje la ansiedad:  •Hable con alguien sobre carbajal ansiedad.Carbajal médico puede sugerirle que reciba consejería. La terapia cognitiva conceptual puede ayudarlo a entender y cambiar la forma que usted reacciona a los eventos que provocan renu síntomas. Usted podría sentirse más cómodo hablando con un familiar o amigo sobre carbajal ansiedad. Elija a alguien que usted sepa que será comprensivo y alentador.      •Aprenda a relajarse.Las actividades charisse el ejercicio, meditación o escuchar música pueden ayudarlo a relajarse. Pase tiempo con amigos, o clifford cosas que disfruta.      •Practique la respiración profunda.La respiración profunda puede ayudarlo a relajarse cuando se sienta ansioso. Enfóquese en respirar lento y profundo varias veces al día, o hiwot un ataque de ansiedad. Respire por la nariz y exhale por la boca.      •[place] velia rutina para dormir.El sueño regular puede ayudarlo a sentirse más tranquilo hiwot el día. Vaya a dormirse y levántese a la misma hora todos los días. No donnell televisión ni use el ordenador giovany antes de acostarse. Carbajal habitación debe ser confortable, oscura y silenciosa.      •Consuma alimentos saludables y variados.Los alimentos saludables incluyen frutas, verduras, productos lácteos bajos en grasa, dorie magras, pescado, panes integrales y frijoles cocidos. Los alimentos saludables pueden ayudarlo a sentir menos ansidedad y tener más energía.  Alimentos saludables           •Realice actividad física con regularidad.El ejercicio puede ayudarlo a aumentar carbajal nivel de energía. El ejercicio también puede elevar carbajal estado de ánimo y ayudarlo a dormir mejor. Carbajal médico puede ayudarle a crear un plan de ejercicios.  Caminar para ejercitarse           •No fume.La nicotina y otros químicos en los cigarrillos y cigarros pueden aumentar la ansiedad. Pida información a carbajal médico si usted actualmente fuma y necesita ayuda para dejar de fumar. Los cigarrillos electrónicos o el tabaco sin humo igualmente contienen nicotina. Consulte con carbajal médico antes de utilizar estos productos.      •No consuma cafeína.La cafeína puede empeorar renu síntomas. No consuma alimentos o bebidas que tengan el propósito de aumentar carbajal nivel de energía.      •Limite o no consuma bebidas alcohólicas.Pregúntele a carbajal médico si usted puede calin alcohol. Es posible que usted no pueda ingerir alcohol si lavonne ciertos medicamentos para la ansiedad o la depresión. Limite el consumo de alcohol a 1 trago por día si es froy. Si usted es hombre, limite el consumo de alcohol a 2 tragos por día. Un trago equivale a 12 onzas de cerveza, 5 onzas de vino o 1 onza y ½ de licor.      •No use drogas.Las drogas también pueden agravar la ansiedad. También pueden dificultar el manejo de la ansiedad. Hable con carbajal médico si usted consume drogas y necesita ayuda para dejarlas.      Acuda a renu consultas de control con carbajal médico dentro de 2 semanas o según le indicaron.Anote renu preguntas para que se acuerde de hacerlas hiwot renu visitas.

## 2020-11-03 NOTE — ED PROVIDER NOTE - CONSTITUTIONAL APPEARANCE HYGIENE, MLM
well appearing Principal Discharge DX:	Tension headache  Assessment and plan of treatment:	- Take valium 5mg up to 3 times per day as needed for muscle spasm. Do no drive while taking this medication  - Take tylenol 1000mg as needed for pain every 6 hours  - Take Motrin 600mg every 6 hours as needed for pain. Take with food.  - Return to ER if you experience worsening headache, vomiting, loss of consciousness, or signs of infection such as high fever or chills  - Otherwise follow up with neurology outpatient [phone]

## 2020-11-11 ENCOUNTER — EMERGENCY (EMERGENCY)
Facility: HOSPITAL | Age: 55
LOS: 1 days | Discharge: HOME | End: 2020-11-11
Attending: EMERGENCY MEDICINE | Admitting: EMERGENCY MEDICINE
Payer: COMMERCIAL

## 2020-11-11 VITALS
SYSTOLIC BLOOD PRESSURE: 143 MMHG | WEIGHT: 268.08 LBS | HEIGHT: 64 IN | OXYGEN SATURATION: 97 % | HEART RATE: 91 BPM | RESPIRATION RATE: 18 BRPM | DIASTOLIC BLOOD PRESSURE: 89 MMHG | TEMPERATURE: 99 F

## 2020-11-11 VITALS
HEART RATE: 89 BPM | OXYGEN SATURATION: 97 % | RESPIRATION RATE: 16 BRPM | DIASTOLIC BLOOD PRESSURE: 82 MMHG | TEMPERATURE: 98 F | SYSTOLIC BLOOD PRESSURE: 116 MMHG

## 2020-11-11 VITALS
TEMPERATURE: 98 F | SYSTOLIC BLOOD PRESSURE: 108 MMHG | HEIGHT: 63 IN | RESPIRATION RATE: 19 BRPM | HEART RATE: 83 BPM | WEIGHT: 268.08 LBS | OXYGEN SATURATION: 97 % | DIASTOLIC BLOOD PRESSURE: 68 MMHG

## 2020-11-11 VITALS
HEART RATE: 78 BPM | TEMPERATURE: 98 F | RESPIRATION RATE: 18 BRPM | DIASTOLIC BLOOD PRESSURE: 58 MMHG | SYSTOLIC BLOOD PRESSURE: 126 MMHG | OXYGEN SATURATION: 100 %

## 2020-11-11 LAB
ALBUMIN SERPL ELPH-MCNC: 2.9 G/DL — LOW (ref 3.3–5)
ALP SERPL-CCNC: 86 U/L — SIGNIFICANT CHANGE UP (ref 30–120)
ALT FLD-CCNC: 22 U/L DA — SIGNIFICANT CHANGE UP (ref 10–60)
ANION GAP SERPL CALC-SCNC: 5 MMOL/L — SIGNIFICANT CHANGE UP (ref 5–17)
ANION GAP SERPL CALC-SCNC: 6 MMOL/L — SIGNIFICANT CHANGE UP (ref 5–17)
AST SERPL-CCNC: 12 U/L — SIGNIFICANT CHANGE UP (ref 10–40)
BASOPHILS # BLD AUTO: 0.02 K/UL — SIGNIFICANT CHANGE UP (ref 0–0.2)
BASOPHILS NFR BLD AUTO: 0.2 % — SIGNIFICANT CHANGE UP (ref 0–2)
BILIRUB SERPL-MCNC: 0.3 MG/DL — SIGNIFICANT CHANGE UP (ref 0.2–1.2)
BUN SERPL-MCNC: 16 MG/DL — SIGNIFICANT CHANGE UP (ref 7–23)
BUN SERPL-MCNC: 17 MG/DL — SIGNIFICANT CHANGE UP (ref 7–23)
CALCIUM SERPL-MCNC: 9.2 MG/DL — SIGNIFICANT CHANGE UP (ref 8.4–10.5)
CALCIUM SERPL-MCNC: 9.3 MG/DL — SIGNIFICANT CHANGE UP (ref 8.4–10.5)
CHLORIDE SERPL-SCNC: 105 MMOL/L — SIGNIFICANT CHANGE UP (ref 96–108)
CHLORIDE SERPL-SCNC: 107 MMOL/L — SIGNIFICANT CHANGE UP (ref 96–108)
CO2 SERPL-SCNC: 31 MMOL/L — SIGNIFICANT CHANGE UP (ref 22–31)
CO2 SERPL-SCNC: 31 MMOL/L — SIGNIFICANT CHANGE UP (ref 22–31)
CREAT SERPL-MCNC: 0.92 MG/DL — SIGNIFICANT CHANGE UP (ref 0.5–1.3)
CREAT SERPL-MCNC: 1.04 MG/DL — SIGNIFICANT CHANGE UP (ref 0.5–1.3)
EOSINOPHIL # BLD AUTO: 0.1 K/UL — SIGNIFICANT CHANGE UP (ref 0–0.5)
EOSINOPHIL NFR BLD AUTO: 1.2 % — SIGNIFICANT CHANGE UP (ref 0–6)
GLUCOSE SERPL-MCNC: 116 MG/DL — HIGH (ref 70–99)
GLUCOSE SERPL-MCNC: 127 MG/DL — HIGH (ref 70–99)
HCG UR QL: NEGATIVE — SIGNIFICANT CHANGE UP
HCT VFR BLD CALC: 36.1 % — SIGNIFICANT CHANGE UP (ref 34.5–45)
HGB BLD-MCNC: 11 G/DL — LOW (ref 11.5–15.5)
IMM GRANULOCYTES NFR BLD AUTO: 0.4 % — SIGNIFICANT CHANGE UP (ref 0–1.5)
LIDOCAIN IGE QN: 91 U/L — SIGNIFICANT CHANGE UP (ref 73–393)
LYMPHOCYTES # BLD AUTO: 2.4 K/UL — SIGNIFICANT CHANGE UP (ref 1–3.3)
LYMPHOCYTES # BLD AUTO: 28 % — SIGNIFICANT CHANGE UP (ref 13–44)
MAGNESIUM SERPL-MCNC: 1.8 MG/DL — SIGNIFICANT CHANGE UP (ref 1.6–2.6)
MCHC RBC-ENTMCNC: 23.7 PG — LOW (ref 27–34)
MCHC RBC-ENTMCNC: 30.5 GM/DL — LOW (ref 32–36)
MCV RBC AUTO: 77.8 FL — LOW (ref 80–100)
MONOCYTES # BLD AUTO: 0.46 K/UL — SIGNIFICANT CHANGE UP (ref 0–0.9)
MONOCYTES NFR BLD AUTO: 5.4 % — SIGNIFICANT CHANGE UP (ref 2–14)
NEUTROPHILS # BLD AUTO: 5.55 K/UL — SIGNIFICANT CHANGE UP (ref 1.8–7.4)
NEUTROPHILS NFR BLD AUTO: 64.8 % — SIGNIFICANT CHANGE UP (ref 43–77)
NRBC # BLD: 0 /100 WBCS — SIGNIFICANT CHANGE UP (ref 0–0)
NT-PROBNP SERPL-SCNC: 52 PG/ML — SIGNIFICANT CHANGE UP (ref 0–125)
PLATELET # BLD AUTO: 300 K/UL — SIGNIFICANT CHANGE UP (ref 150–400)
POTASSIUM SERPL-MCNC: 2.9 MMOL/L — CRITICAL LOW (ref 3.5–5.3)
POTASSIUM SERPL-MCNC: 3.8 MMOL/L — SIGNIFICANT CHANGE UP (ref 3.5–5.3)
POTASSIUM SERPL-SCNC: 2.9 MMOL/L — CRITICAL LOW (ref 3.5–5.3)
POTASSIUM SERPL-SCNC: 3.8 MMOL/L — SIGNIFICANT CHANGE UP (ref 3.5–5.3)
PROT SERPL-MCNC: 7 G/DL — SIGNIFICANT CHANGE UP (ref 6–8.3)
RBC # BLD: 4.64 M/UL — SIGNIFICANT CHANGE UP (ref 3.8–5.2)
RBC # FLD: 15.8 % — HIGH (ref 10.3–14.5)
SODIUM SERPL-SCNC: 142 MMOL/L — SIGNIFICANT CHANGE UP (ref 135–145)
SODIUM SERPL-SCNC: 143 MMOL/L — SIGNIFICANT CHANGE UP (ref 135–145)
TROPONIN I SERPL-MCNC: 0 NG/ML — LOW (ref 0.02–0.06)
TROPONIN I SERPL-MCNC: 0 NG/ML — LOW (ref 0.02–0.06)
WBC # BLD: 8.56 K/UL — SIGNIFICANT CHANGE UP (ref 3.8–10.5)
WBC # FLD AUTO: 8.56 K/UL — SIGNIFICANT CHANGE UP (ref 3.8–10.5)

## 2020-11-11 PROCEDURE — 80048 BASIC METABOLIC PNL TOTAL CA: CPT

## 2020-11-11 PROCEDURE — 85025 COMPLETE CBC W/AUTO DIFF WBC: CPT

## 2020-11-11 PROCEDURE — 36415 COLL VENOUS BLD VENIPUNCTURE: CPT

## 2020-11-11 PROCEDURE — 99284 EMERGENCY DEPT VISIT MOD MDM: CPT

## 2020-11-11 PROCEDURE — 71045 X-RAY EXAM CHEST 1 VIEW: CPT | Mod: 26

## 2020-11-11 PROCEDURE — 99283 EMERGENCY DEPT VISIT LOW MDM: CPT

## 2020-11-11 PROCEDURE — 93005 ELECTROCARDIOGRAM TRACING: CPT

## 2020-11-11 PROCEDURE — 99284 EMERGENCY DEPT VISIT MOD MDM: CPT | Mod: 25

## 2020-11-11 PROCEDURE — 84484 ASSAY OF TROPONIN QUANT: CPT

## 2020-11-11 PROCEDURE — 81025 URINE PREGNANCY TEST: CPT

## 2020-11-11 PROCEDURE — 71045 X-RAY EXAM CHEST 1 VIEW: CPT

## 2020-11-11 PROCEDURE — 83690 ASSAY OF LIPASE: CPT

## 2020-11-11 PROCEDURE — 80053 COMPREHEN METABOLIC PANEL: CPT

## 2020-11-11 PROCEDURE — 93010 ELECTROCARDIOGRAM REPORT: CPT

## 2020-11-11 PROCEDURE — 83735 ASSAY OF MAGNESIUM: CPT

## 2020-11-11 PROCEDURE — 96365 THER/PROPH/DIAG IV INF INIT: CPT

## 2020-11-11 PROCEDURE — 83880 ASSAY OF NATRIURETIC PEPTIDE: CPT

## 2020-11-11 RX ORDER — POTASSIUM CHLORIDE 20 MEQ
10 PACKET (EA) ORAL ONCE
Refills: 0 | Status: COMPLETED | OUTPATIENT
Start: 2020-11-11 | End: 2020-11-11

## 2020-11-11 RX ORDER — POTASSIUM CHLORIDE 20 MEQ
40 PACKET (EA) ORAL DAILY
Refills: 0 | Status: DISCONTINUED | OUTPATIENT
Start: 2020-11-11 | End: 2020-11-15

## 2020-11-11 RX ORDER — ACETAMINOPHEN 500 MG
650 TABLET ORAL ONCE
Refills: 0 | Status: COMPLETED | OUTPATIENT
Start: 2020-11-11 | End: 2020-11-11

## 2020-11-11 RX ORDER — SUCRALFATE 1 G
1 TABLET ORAL ONCE
Refills: 0 | Status: COMPLETED | OUTPATIENT
Start: 2020-11-11 | End: 2020-11-11

## 2020-11-11 RX ADMIN — Medication 100 MILLIEQUIVALENT(S): at 19:27

## 2020-11-11 RX ADMIN — Medication 650 MILLIGRAM(S): at 16:33

## 2020-11-11 RX ADMIN — Medication 40 MILLIEQUIVALENT(S): at 17:53

## 2020-11-11 RX ADMIN — Medication 10 MILLIEQUIVALENT(S): at 19:45

## 2020-11-11 RX ADMIN — Medication 10 MILLIEQUIVALENT(S): at 19:27

## 2020-11-11 RX ADMIN — Medication 650 MILLIGRAM(S): at 17:15

## 2020-11-11 RX ADMIN — Medication 1 GRAM(S): at 16:33

## 2020-11-11 RX ADMIN — Medication 100 MILLIEQUIVALENT(S): at 17:54

## 2020-11-11 NOTE — ED ADULT TRIAGE NOTE - CHIEF COMPLAINT QUOTE
Discharged from [facility] 45 minutes ago and instructed to follow-up with cardiologist, has scheduled appt for 11/18, states continued dizziness.

## 2020-11-11 NOTE — ED PROVIDER NOTE - NSFOLLOWUPINSTRUCTIONS_ED_ALL_ED_FT
Hypotension    WHAT YOU NEED TO KNOW:    Hypotension is a condition that causes your blood pressure (BP) to drop lower than it should be. Hypotension may be mild, serious, or life-threatening.    DISCHARGE INSTRUCTIONS:    Medicines:   •Alpha-adrenoreceptor agonists: These medicines may increase your BP and decrease your symptoms. Take these medicines as directed.       •Steroids: This medicine helps prevent salt loss from your body. Steroids may also help increase the amount of fluid in your body and raise your BP.      •Vasopressors: These medicines help constrict (make smaller) your blood vessels and increase your BP. Vasopressor medicines may increase the blood flow to your brain and help decrease your symptoms.      •Antidiuretic hormone: This medicine helps control your BP and helps decrease your need to urinate during the night.       •Antiparkinson medicine: This medicine may help increase your standing BP and decrease your symptoms.      •Take your medicine as directed. Contact your healthcare provider if you think your medicine is not helping or if you have side effects. Tell him of her if you are allergic to any medicine. Keep a list of the medicines, vitamins, and herbs you take. Include the amounts, and when and why you take them. Bring the list or the pill bottles to follow-up visits. Carry your medicine list with you in case of an emergency.      Follow up with your healthcare provider or specialist as directed: Write down your questions so you remember to ask them during your visits.    Check your blood pressure: You may need to check your BP at home. Record the results and bring them with you to follow-up visits. Ask when and how often to check your BP. You may need to wear a BP monitor for up to 24 hours. This will record your blood pressure during your normal daily activities. The monitor will take your BP every 15 to 30 minutes. Try to keep still while your BP is taken. Avoid heavy activity, such as exercise, while you are wearing the monitor.    How to take a Blood Pressure         Manage your symptoms:   •Change positions slowly: When you get out of bed, sit up first, then slowly move your legs to the side of the bed. If you are not having any symptoms, slowly stand up. If you have symptoms, sit down right away.      •Exercise and do physical counter maneuvers: Ask your healthcare provider or specialist about the best exercise plan for you. Physical counter maneuvers may help to increase your BP and increase blood flow to your heart. They include crossing your legs, squatting, and bending at the waist. You can also rise up on your toes while you are standing, and tighten your thigh muscles.      •Drink liquids as directed: Ask your healthcare provider or specialist how much liquid to drink each day and which liquids are best for you. Drink 500 milliliters (½ liter) of liquid quickly in the morning or before meals to help increase your BP. Your healthcare provider may tell you to drink 2 cups of coffee with, or after, breakfast and lunch. The caffeine in the coffee can help prevent a drop in your BP. Your healthcare provider may also give you caffeine pills.       •Change how you eat meals: If your BP drops after eating large meals, try to eat smaller meals more often. Eat foods low in carbohydrates and cholesterol to help prevent BP drops after you eat. Ask if you need to increase the amount of sodium (salt) you eat each day.      •Raise the head of your bed: Raise the head of your bed 4 to 8 inches. This may help prevent morning BP drops and decrease the need to urinate during the night.      Avoid things that make your hypotension worse:   •Do not drink alcohol: Alcohol can make your symptoms worse. Ask for information if you need help quitting.      •Avoid straining: Activities and movements that cause you to strain can cause a drop in your BP. Activities to avoid include lifting, coughing, and other movements that increase the feeling of pressure in your chest.      •Avoid the heat: This can cause a decrease in your BP. Stay inside during very hot days, or limit the amount of time you are outside. Do not take hot baths.      Contact your healthcare provider or specialist if:   •You vomit several times or have diarrhea, and you cannot drink liquid.      •You have a fever.       •You have new or increased symptoms, such as dizziness, weakness, or fainting.      •Your legs, ankles, and feet are swollen, or you gain weight for no known reason.      •You have questions or concerns about your condition or care.      Return to the emergency department if:   •You become confused or cannot speak.      •You urinate very little or not at all.      •You have a seizure.      •You have chest pain or trouble breathing.      •You have changes in vision or cannot see.

## 2020-11-11 NOTE — ED ADULT NURSE NOTE - OBJECTIVE STATEMENT
Pt came in for chest pain radiating to her left arm x 10minutes PTA. No fever No SOB No chills No diaphoresis

## 2020-11-11 NOTE — ED PROVIDER NOTE - OBJECTIVE STATEMENT
56yo female who presents with dizziness today. pt was just seen and worked up at [place] for same complaints, had blood work and was sent home to follow up with her cardiologist and she got home and felt dizzy, pt states her BP has been up and down, yesterady she did not take her losartan but today she did, no chest pain or sob, no other complaints.

## 2020-11-11 NOTE — ED ADULT NURSE NOTE - OBJECTIVE STATEMENT
Patient complaining of dizziness was seen and evaluated at [facility] and was discharged when she reached home still feeling dizzy was seen and evaluated by MD Vaughn no new order made.

## 2020-11-11 NOTE — ED PROVIDER NOTE - OBJECTIVE STATEMENT
54 y/o female with PMHx of mitral valve prolapse, CAD, Asthma, GERD, HTN presents to the ED c/o CP. Pt reports chest pressure radiating to left arm since 10 minutes PTA, while sitting on sofa. Pt took Losartan and 81mg ASA this morning PTA. Pt went to pulmonologist yesterday and reported to be hypotensive, and pt took Losartan only 50 minutes PTA today. Pt also notes mild HA. Pt notes chest pressure has since resolved in ED. Denies SOB, abd pain, n/v. Pt reports history of GERD but is compliant with medication. 54 y/o female with PMHx of mitral valve prolapse, CAD, Asthma, GERD, HTN presents to the ED c/o CP. Pt reports chest pressure radiating to left arm which began 10 minutes PTA, while sitting on sofa. Pt took Losartan and additional ASA 81 mg after onset of symptoms with some improvement. She also reports that she had taken ASA 81 mg this morning as well. Pt went to pulmonologist yesterday and was told that she was hypotensive. It was recommended that she hold her Losartan today and so she believes that her symptoms may have been due to an elevated blood pressure and confirms that symptoms have been improving since taking her Losartan. Pt also notes mild HA. Pt notes chest pressure has since resolved in ED but she has a residual vague burning sensation to the mid sternal region. Denies fever, chills, SOB, abd pain, n/v. Pt reports history of GERD but is compliant with medication.

## 2020-11-11 NOTE — ED PROVIDER NOTE - CLINICAL SUMMARY MEDICAL DECISION MAKING FREE TEXT BOX
54 y/o female presenting with burning CP, improving. Appears to be more reflux related, but will obtain screening labs, EKG, troponin, and monitor pt. Of note, pt does have a history of Anxiety, although she reports she did not feel overly anxious at the time.

## 2020-11-11 NOTE — ED ADULT NURSE NOTE - NSIMPLEMENTINTERV_GEN_ALL_ED
Implemented All Universal Safety Interventions:  [place] to call system. Call bell, personal items and telephone within reach. Instruct patient to call for assistance. Room bathroom lighting operational. Non-slip footwear when patient is off stretcher. Physically safe environment: no spills, clutter or unnecessary equipment. Stretcher in lowest position, wheels locked, appropriate side rails in place.

## 2020-11-11 NOTE — ED PROVIDER NOTE - CLINICAL SUMMARY MEDICAL DECISION MAKING FREE TEXT BOX
56yo female with hypotension, dizziness due to medication adjustment, supportive care, d/c home follow up with cardiologist, advised pt to take losartan 25mg BID vs 50mg BID and check her BP regularly

## 2020-11-11 NOTE — ED PROVIDER NOTE - PATIENT PORTAL LINK FT
You can access the FollowMyHealth Patient Portal offered by [facility] by registering at the following website: [email or web address]. By joining [facility]’s FollowMyHealth portal, you will also be able to view your health information using other applications (apps) compatible with our system.

## 2020-11-11 NOTE — ED PROVIDER NOTE - ENMT, MLM
Airway patent, Nasal mucosa clear. Mouth with normal mucosa. Throat has no vesicles, no oropharyngeal exudates and uvula is midline. +Soft cervical collar in place for reported "nerve problem in neck" Airway patent, Nasal mucosa clear. Mouth with normal mucosa. +Soft cervical collar in place for reported "nerve problem in neck"

## 2020-11-11 NOTE — ED ADULT NURSE REASSESSMENT NOTE - NS ED NURSE REASSESS COMMENT FT1
Patient  revisited and reevaluated. Pt remain stable. Pt updated with the plan of care. Pt verbalized understanding.

## 2020-11-11 NOTE — ED PROVIDER NOTE - SHIFT CHANGE DETAILS
pending repeat troponin, and recheck of potassium.  Will re-elv pt prior to discharge. At this time pt resting comfortably with no complaints she has been ambulating without ataxia

## 2020-11-11 NOTE — ED PROVIDER NOTE - PATIENT PORTAL LINK FT
You can access the FollowMyHealth Patient Portal offered by [facility] by registering at the following website: [email or web address]. By joining [employer]’s FollowMyHealth portal, you will also be able to view your health information using other applications (apps) compatible with our system.

## 2020-11-22 ENCOUNTER — EMERGENCY (EMERGENCY)
Facility: HOSPITAL | Age: 55
LOS: 1 days | Discharge: HOME | End: 2020-11-22
Attending: EMERGENCY MEDICINE | Admitting: EMERGENCY MEDICINE
Payer: COMMERCIAL

## 2020-11-22 VITALS
TEMPERATURE: 98 F | SYSTOLIC BLOOD PRESSURE: 153 MMHG | DIASTOLIC BLOOD PRESSURE: 84 MMHG | RESPIRATION RATE: 20 BRPM | OXYGEN SATURATION: 98 % | WEIGHT: 270.07 LBS | HEART RATE: 86 BPM | HEIGHT: 63 IN

## 2020-11-22 VITALS
TEMPERATURE: 99 F | HEART RATE: 80 BPM | RESPIRATION RATE: 16 BRPM | OXYGEN SATURATION: 99 % | SYSTOLIC BLOOD PRESSURE: 140 MMHG | DIASTOLIC BLOOD PRESSURE: 82 MMHG

## 2020-11-22 PROCEDURE — 99283 EMERGENCY DEPT VISIT LOW MDM: CPT | Mod: 25

## 2020-11-22 PROCEDURE — 99283 EMERGENCY DEPT VISIT LOW MDM: CPT

## 2020-11-22 PROCEDURE — 96372 THER/PROPH/DIAG INJ SC/IM: CPT

## 2020-11-22 RX ORDER — KETOROLAC TROMETHAMINE 30 MG/ML
15 SYRINGE (ML) INJECTION ONCE
Refills: 0 | Status: DISCONTINUED | OUTPATIENT
Start: 2020-11-22 | End: 2020-11-22

## 2020-11-22 RX ORDER — ACETAMINOPHEN 500 MG
650 TABLET ORAL ONCE
Refills: 0 | Status: COMPLETED | OUTPATIENT
Start: 2020-11-22 | End: 2020-11-22

## 2020-11-22 RX ADMIN — Medication 15 MILLIGRAM(S): at 20:46

## 2020-11-22 RX ADMIN — Medication 650 MILLIGRAM(S): at 20:46

## 2020-11-22 NOTE — ED PROVIDER NOTE - PROGRESS NOTE DETAILS
patient refused ct head, states she had a ct last month.  advised follow up with her cardio in [facility], call tomorrow to discuss her medications, patient states she has appt for MRI on Tuesday, and cardio appt on Wednesday. Scribe AS for Dr. Bruce: 56 y/o female with a PMHx of HTN, MVP, GERD, Asthma presents to the ED c/o HA. Denies N/V, fevers. No falls or trauma to her head. Pt is scheduled to have an MRI in two days. PE: Awake, alert, neuro exam normal.

## 2020-11-22 NOTE — ED PROVIDER NOTE - CHPI ED SYMPTOMS NEG
no tingling/no nausea/no numbness/no weakness/no decreased eating/drinking/no dizziness/no fever/no vomiting/no chills

## 2020-11-22 NOTE — ED PROVIDER NOTE - PATIENT PORTAL LINK FT
You can access the FollowMyHealth Patient Portal offered by [facility] by registering at the following website: [email or web address]. By joining [facility]’s FollowMyHealth portal, you will also be able to view your health information using other applications (apps) compatible with our system.

## 2020-11-22 NOTE — ED PROVIDER NOTE - NSFOLLOWUPINSTRUCTIONS_ED_ALL_ED_FT
follow up with your cardiologist , [facility]  call tomorrow morning to discuss  your medication with your cardiologist  any concerns return to ED

## 2020-11-22 NOTE — ED PROVIDER NOTE - OBJECTIVE STATEMENT
55 y female presents with generalized headache x 1 day , states she was advised by her cardiologist in [facility] , cannot recall name of cardio, to stop her bp medication x 1 week, related to her potassium being low, states she is taking potassium,  denies blurred vision , is presently wearing a soft collar for her chronic neck pain, states her neurologist advised she wear the soft collar for support,  states she otherwise feels well, no nvd, no fever, no chest pain, no sob.  state she did not try any otc tylenol or motrin for her headache. states she eats and drinks well.

## 2020-11-22 NOTE — ED PROVIDER NOTE - ATTENDING CONTRIBUTION TO CARE
Codey Bruce MD: I have personally performed a face to face diagnostic evaluation on this patient.  I have reviewed the PA note and agree with the history, exam, and plan of care, except as noted.  History and Exam by me shows same findings as documented

## 2020-11-22 NOTE — ED ADULT NURSE NOTE - CHPI ED NUR SYMPTOMS NEG
no vomiting/no blurred vision/no confusion/no weakness/no loss of consciousness/no dizziness/no fever/no change in level of consciousness/no nausea/no numbness

## 2020-11-22 NOTE — ED PROVIDER NOTE - NS_ ATTENDINGSCRIBEDETAILS _ED_A_ED_FT
Codey Bruce MD - The scribe's documentation has been prepared under my direction and personally reviewed by me in its entirety. I confirm that the note above accurately reflects all work, treatment, procedures, and medical decision making performed by me.

## 2020-11-22 NOTE — ED PROVIDER NOTE - CLINICAL SUMMARY MEDICAL DECISION MAKING FREE TEXT BOX
headache x 1 day, hx of htn,  no neurological findings,  will give im pain med and tylenol,  patient refused ct,  patient to follow up with her cardiologist

## 2021-01-01 NOTE — ED PROVIDER NOTE - CHPI ED SYMPTOMS POS
HEADACHE - Patient presented with a fall    - US doppler LE negative DVT   - Bilateral hip x ray showed no bone pathology   - CT head no acute event  - CT cervical spine no fractures   - C/w Tylenol, tramadol and flexeril for pain management   - Pain management on board   - F/u echo   - PT recommends DAVID

## 2021-03-08 NOTE — ED PROVIDER NOTE - EKG #1 DATE/TIME
Continue Regimen: Tretinoin BIW-TIW at night
Detail Level: Simple
Initiate Treatment: Plexion cleanser QD\\nOnexton QAM
Otc Regimen: Vitex Berry vitamin \\nLimit dairy intake
07-Jan-2020 13:35

## 2021-04-11 ENCOUNTER — TRANSCRIPTION ENCOUNTER (OUTPATIENT)
Age: 56
End: 2021-04-11

## 2021-06-10 NOTE — ED PROVIDER NOTE - NS ED ATTENDING STATEMENT MOD
[FreeTextEntry8] : Mr. MARY TILLEY is a 43 year M who comes in for an acute visit.\par Patient states he went to [place] from April 14 until May or tea and on May 10 he started with symptoms of diarrhea. He had 2-3 episodes per day and took over-the-counter medication in [place] including Buscapina (tylenol/caffeine/prilamine) and Enterohermania (probiotic) which helped with his symptoms. His diarrhea has greatly improved and his last episode was yesterday was one time, loose watery and nonbloody. He does have some mild abdominal pain with diarrhea episodes. \par He did also recently have his first benzathine penicillin injection and had the side effect of pain at the site of injection and is following up with the [facility] regarding this.\par Patient denies any cp, sob,nausea, vomiting, palpitations, fever, chills.\par  Attending Only

## 2021-10-10 NOTE — ED PROVIDER NOTE - CPE EDP CARDIAC NORM
PAST MEDICAL HISTORY:  Depressive disorder     Lumbar herniated disc     Primary lateral sclerosis      normal...

## 2022-03-08 NOTE — ED ADULT NURSE NOTE - LOCATION
Subjective    This patient is seen and examined this morning in his room resting comfortably currently on room air no acute respiratory distress appreciated.  REVIEW OF SYSTEMS:  Review of Systems   Constitutional: Negative for chills, diaphoresis, fatigue and fever.   HENT: Negative for congestion, ear pain, hearing loss, rhinorrhea, sinus pressure, sinus pain, sneezing and sore throat.    Eyes: Negative for photophobia and visual disturbance.   Respiratory: Negative for cough, shortness of breath and wheezing.    Cardiovascular: Negative for chest pain, palpitations and leg swelling.   Gastrointestinal: Negative for abdominal distention and abdominal pain.   Endocrine: Negative for polyuria.   Genitourinary: Negative for difficulty urinating, frequency and urgency.   Musculoskeletal: Negative for arthralgias, gait problem and neck pain.   Skin: Negative for color change and rash.   Neurological: Negative for dizziness, seizures, weakness, light-headedness, numbness and headaches.   Psychiatric/Behavioral: Negative for agitation and behavioral problems.       I/O's    Intake/Output Summary (Last 24 hours) at 3/8/2022 0826  Last data filed at 3/8/2022 0300  Gross per 24 hour   Intake 42.27 ml   Output 2180 ml   Net -2137.73 ml       Last Recorded Vitals  Blood pressure 116/73, pulse 78, temperature 97.3 °F (36.3 °C), temperature source Oral, resp. rate 17, height 5' 10\" (1.778 m), weight 72.7 kg (160 lb 4.4 oz), SpO2 97 %.  Body mass index is 23 kg/m².      Physical Exam  Vitals and nursing note reviewed.   Constitutional:       Appearance: Normal appearance. He is well-developed. He is not ill-appearing.   HENT:      Head: Normocephalic.      Right Ear: External ear normal.      Left Ear: External ear normal.      Nose: Nose normal.      Mouth/Throat:      Mouth: Mucous membranes are moist.   Eyes:      Conjunctiva/sclera: Conjunctivae normal.   Cardiovascular:      Rate and Rhythm: Normal rate and regular rhythm.       Heart sounds: Normal heart sounds.   Pulmonary:      Effort: Pulmonary effort is normal. No respiratory distress.      Breath sounds: Normal breath sounds.   Abdominal:      General: Bowel sounds are normal. There is no distension.      Palpations: Abdomen is soft.   Musculoskeletal:         General: Normal range of motion.      Cervical back: Normal range of motion and neck supple.   Skin:     General: Skin is warm and dry.   Neurological:      Mental Status: He is alert. Mental status is at baseline.           Labs   Recent Labs   Lab 03/08/22  0529 03/07/22  0805 03/06/22  2339   SODIUM 138 136 134*   POTASSIUM 4.3 4.3 3.5   CHLORIDE 107 106 102   CO2 26 25 25   BUN 18 17 20   CREATININE 0.76 0.81 0.99   GLUCOSE 138* 143* 213*   CALCIUM 8.7 8.7 8.8      Recent Labs   Lab 03/08/22  0529 03/07/22  0805 03/06/22  2339   SODIUM 138 136 134*   CHLORIDE 107 106 102   CO2 26 25 25   BUN 18 17 20   CREATININE 0.76 0.81 0.99   CALCIUM 8.7 8.7 8.8   ALBUMIN  --   --  4.1   BILIRUBIN  --   --  0.7   ALKPT  --   --  47   GPT  --   --  33   AST  --   --  24   GLUCOSE 138* 143* 213*      Recent Labs   Lab 03/08/22  0529 03/07/22  0805   WBC 16.0* 11.3*   RBC  --  4.39*   HGB 13.4 13.6   HCT  --  39.8    284        Imaging      XR CHEST PA OR AP 1 VIEW   Final Result by Parth Clinton MD (03/07 0631)      1.  Satisfactory positioning of endotracheal and enteric tubes.   2.  No radiographic evidence of an acute cardiopulmonary process.         Electronically Signed by: PARTH CLINTON M.D.    Signed on: 3/7/2022 6:31 AM                 ASSESSMENT/ PLAN  Acute hypercapnic respiratory failure   Currently extubated on room air  Status asthmaticus   Patient refusing duo nebs   Changed to inhaled albuterol via hhn  Positive cocaine use  Positive amphetamine use    I discussed case great detail with the patient as well as with the nurse at the bedside for my perspective patient is medically stable for discharge will discuss  with pulmonology tapering dose of steroids and maintenance medications for status asthmaticus.  Additionally I have spoken to the patient in great detail about his need to refrain from use of cocaine amphetamines and to follow treatment plan upon discharge.    DVT Prophylaxis   as per orders    Smoking Cessation  Ready to quit: Not Answered  Counseling given: Not Answered            Marty Avitia, DO     generalized

## 2022-03-26 NOTE — ED ADULT NURSE NOTE - CHPI ED NUR SYMPTOMS NEG
no
no chest pain/no chills/no edema/no headache/no body aches/no diaphoresis/no fever/no hemoptysis/no wheezing

## 2022-06-06 ENCOUNTER — NON-APPOINTMENT (OUTPATIENT)
Age: 57
End: 2022-06-06

## 2022-06-12 ENCOUNTER — NON-APPOINTMENT (OUTPATIENT)
Age: 57
End: 2022-06-12

## 2022-06-14 NOTE — ED ADULT TRIAGE NOTE - HEIGHT IN INCHES
Patient seen.  Labs and vitals reviewed.  K 5.8 -  Treated with medications.  Awaiting gregory catheter placement.   3
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
## 2022-10-13 ENCOUNTER — EMERGENCY (EMERGENCY)
Facility: HOSPITAL | Age: 57
LOS: 1 days | Discharge: HOME | End: 2022-10-13
Attending: EMERGENCY MEDICINE | Admitting: EMERGENCY MEDICINE
Payer: COMMERCIAL

## 2022-10-13 VITALS
DIASTOLIC BLOOD PRESSURE: 84 MMHG | TEMPERATURE: 99 F | HEIGHT: 64 IN | RESPIRATION RATE: 16 BRPM | HEART RATE: 88 BPM | OXYGEN SATURATION: 97 % | WEIGHT: 268.08 LBS | SYSTOLIC BLOOD PRESSURE: 127 MMHG

## 2022-10-13 VITALS
HEART RATE: 69 BPM | RESPIRATION RATE: 18 BRPM | DIASTOLIC BLOOD PRESSURE: 76 MMHG | SYSTOLIC BLOOD PRESSURE: 113 MMHG | TEMPERATURE: 98 F | OXYGEN SATURATION: 98 %

## 2022-10-13 PROCEDURE — 72128 CT CHEST SPINE W/O DYE: CPT | Mod: MA

## 2022-10-13 PROCEDURE — 70450 CT HEAD/BRAIN W/O DYE: CPT | Mod: MA

## 2022-10-13 PROCEDURE — 73562 X-RAY EXAM OF KNEE 3: CPT | Mod: 26,50

## 2022-10-13 PROCEDURE — 70450 CT HEAD/BRAIN W/O DYE: CPT | Mod: 26,MA

## 2022-10-13 PROCEDURE — 71046 X-RAY EXAM CHEST 2 VIEWS: CPT | Mod: 26

## 2022-10-13 PROCEDURE — 73060 X-RAY EXAM OF HUMERUS: CPT

## 2022-10-13 PROCEDURE — 73030 X-RAY EXAM OF SHOULDER: CPT

## 2022-10-13 PROCEDURE — 73060 X-RAY EXAM OF HUMERUS: CPT | Mod: 26,RT

## 2022-10-13 PROCEDURE — 72125 CT NECK SPINE W/O DYE: CPT | Mod: 26,MA

## 2022-10-13 PROCEDURE — 73130 X-RAY EXAM OF HAND: CPT | Mod: 26,RT

## 2022-10-13 PROCEDURE — 73562 X-RAY EXAM OF KNEE 3: CPT

## 2022-10-13 PROCEDURE — 72125 CT NECK SPINE W/O DYE: CPT | Mod: MA

## 2022-10-13 PROCEDURE — 72128 CT CHEST SPINE W/O DYE: CPT | Mod: 26,MA

## 2022-10-13 PROCEDURE — 71046 X-RAY EXAM CHEST 2 VIEWS: CPT

## 2022-10-13 PROCEDURE — 73110 X-RAY EXAM OF WRIST: CPT | Mod: 26,RT

## 2022-10-13 PROCEDURE — 73030 X-RAY EXAM OF SHOULDER: CPT | Mod: 26,RT

## 2022-10-13 PROCEDURE — 73130 X-RAY EXAM OF HAND: CPT

## 2022-10-13 PROCEDURE — 99285 EMERGENCY DEPT VISIT HI MDM: CPT

## 2022-10-13 PROCEDURE — 99284 EMERGENCY DEPT VISIT MOD MDM: CPT | Mod: 25

## 2022-10-13 PROCEDURE — 73110 X-RAY EXAM OF WRIST: CPT

## 2022-10-13 RX ORDER — CYCLOBENZAPRINE HYDROCHLORIDE 10 MG/1
10 TABLET, FILM COATED ORAL ONCE
Refills: 0 | Status: COMPLETED | OUTPATIENT
Start: 2022-10-13 | End: 2022-10-13

## 2022-10-13 RX ORDER — IBUPROFEN 200 MG
1 TABLET ORAL
Qty: 20 | Refills: 0
Start: 2022-10-13 | End: 2022-10-17

## 2022-10-13 RX ORDER — CYCLOBENZAPRINE HYDROCHLORIDE 10 MG/1
1 TABLET, FILM COATED ORAL
Qty: 15 | Refills: 0
Start: 2022-10-13 | End: 2022-10-17

## 2022-10-13 RX ORDER — OXYCODONE AND ACETAMINOPHEN 5; 325 MG/1; MG/1
1 TABLET ORAL ONCE
Refills: 0 | Status: DISCONTINUED | OUTPATIENT
Start: 2022-10-13 | End: 2022-10-13

## 2022-10-13 RX ORDER — LIDOCAINE 4 G/100G
1 CREAM TOPICAL
Qty: 5 | Refills: 0
Start: 2022-10-13 | End: 2022-10-17

## 2022-10-13 RX ADMIN — OXYCODONE AND ACETAMINOPHEN 1 TABLET(S): 5; 325 TABLET ORAL at 19:40

## 2022-10-13 RX ADMIN — CYCLOBENZAPRINE HYDROCHLORIDE 10 MILLIGRAM(S): 10 TABLET, FILM COATED ORAL at 19:10

## 2022-10-13 RX ADMIN — OXYCODONE AND ACETAMINOPHEN 1 TABLET(S): 5; 325 TABLET ORAL at 19:10

## 2022-10-13 NOTE — ED PROVIDER NOTE - NSICDXFAMILYHX_GEN_ALL_CORE_FT
FAMILY HISTORY:  Father  Still living? Unknown  Family history of hypertension, Age at diagnosis: Age Unknown    Mother  Still living? Unknown  Family history of hypertension, Age at diagnosis: Age Unknown  Family history of uterine cancer, Age at diagnosis: Age Unknown

## 2022-10-13 NOTE — ED PROVIDER NOTE - NSICDXPASTMEDICALHX_GEN_ALL_CORE_FT
PAST MEDICAL HISTORY:  Asthma     Benign Essential Hypertension     GERD (Gastroesophageal Reflux Disease)     HTN (hypertension)     MVP (mitral valve prolapse)     Right-sided carotid artery disease       DM (diabetes mellitus)

## 2022-10-13 NOTE — ED PROVIDER NOTE - CARE PROVIDER_API CALL
Jona Harvey)  Orthopaedic Surgery; Sports Medicine  [address] [facility], [address] [place] [address]  Phone: [phone]  Fax: [phone]  Follow Up Time:
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
## 2022-10-16 ENCOUNTER — EMERGENCY (EMERGENCY)
Facility: HOSPITAL | Age: 57
LOS: 1 days | Discharge: HOME | End: 2022-10-16
Attending: EMERGENCY MEDICINE | Admitting: EMERGENCY MEDICINE
Payer: COMMERCIAL

## 2022-10-16 VITALS
TEMPERATURE: 98 F | SYSTOLIC BLOOD PRESSURE: 122 MMHG | RESPIRATION RATE: 18 BRPM | DIASTOLIC BLOOD PRESSURE: 77 MMHG | OXYGEN SATURATION: 98 % | HEART RATE: 88 BPM

## 2022-10-16 VITALS
SYSTOLIC BLOOD PRESSURE: 108 MMHG | WEIGHT: 259.93 LBS | HEART RATE: 92 BPM | OXYGEN SATURATION: 95 % | RESPIRATION RATE: 20 BRPM | DIASTOLIC BLOOD PRESSURE: 76 MMHG | TEMPERATURE: 99 F | HEIGHT: 64 IN

## 2022-10-16 PROCEDURE — 99283 EMERGENCY DEPT VISIT LOW MDM: CPT

## 2022-10-16 RX ORDER — ACETAMINOPHEN 500 MG
650 TABLET ORAL ONCE
Refills: 0 | Status: COMPLETED | OUTPATIENT
Start: 2022-10-16 | End: 2022-10-16

## 2022-10-16 RX ADMIN — Medication 650 MILLIGRAM(S): at 20:15

## 2022-10-16 RX ADMIN — Medication 650 MILLIGRAM(S): at 19:42
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
## 2022-10-16 NOTE — ED PROVIDER NOTE - NSFOLLOWUPINSTRUCTIONS_ED_ALL_ED_FT
Lesión en la arsenio en los adultos    Head Injury, Adult       Hay muchos tipos de lesiones en la arsenio. Las lesiones en la arsenio pueden ser tan leves charisse un chichón pequeño o pueden ser un problema médico grave. Algunas de las lesiones graves en la arsenio son:  •Lesión que provoque un impacto en el cerebro (conmoción cerebral).      •Un moretón (contusión) en el cerebro. [place] significa que hay hemorragia en el cerebro que puede causar hinchazón.      •Fisura en el cráneo (fractura de cráneo).      •Hemorragia en el cerebro que se acumula, se coagula y forma velia protuberancia (hematoma).      Después de velia lesión en la arsenio, la mayoría de los problemas ocurren dentro de las primeras 24 horas, thanh los efectos secundarios pueden aparecer entre 7 y 10 días después de la lesión. Es importante controlar carbajal afección para adán si hay cambios. Es posible que deban observarlo en el departamento de emergencias o en el servicio de atención urgente, o también puede ser que deban hospitalizarlo.      ¿Cuáles son las causas?    Hay muchas causas posibles de velia lesión en la arsenio. Las lesiones graves en la arsenio puede deberse a un accidente automovilístico, a accidentes en bicicleta o motocicleta, a lesiones deportivas, a caídas y a ser golpeado por un objeto.      ¿Cuáles son los síntomas?    Los síntomas de lesión en la arsenio incluyen velia contusión, un chichón o sangrado en el lugar de la lesión. Otros síntomas físicos pueden ser:  •Dolor de arsenio.      •Náuseas o vómitos.      •Mareos.      •Visión borrosa o doble.      •Sentir incomodidad cerca de luces brillantes o ruidos ino.      •Convulsiones.      •Cansancio.      •Dificultad para despertarse.      •Pérdida de la conciencia.      Los síntomas mentales o emocionales pueden incluir los siguientes:  •Irritabilidad.      •Confusión y problemas de memoria.      •Falta de atención y concentración.      •Cambios en los hábitos de alimentación o en el sueño.      •Sentir ansiedad o depresión.        ¿Cómo se diagnostica?    Esta afección suele diagnosticarse en función de los síntomas, de velia descripción de la lesión y de un examen físico. También pueden hacerle estudios de diagnóstico por imágenes, charisse velia resonancia magnética (RM) o velia exploración por tomografía computarizada (TC).      ¿Cómo se trata?    El tratamiento de esta afección depende de la gravedad y el tipo de lesión que sufrió. El objetivo principal del tratamiento es prevenir complicaciones y darle tiempo al cerebro para que se recupere.    Lesión de arsenio leve     Si usted sufre velia lesión de arsenio leve, es posible que lo envíen a casa, y el tratamiento puede incluir lo siguiente:  •Observación. Un adulto responsable debe quedarse con usted hiwot 24 horas después de producida la lesión y controlarlo con frecuencia.      •[place] físico.      •[place] cerebral.      •Analgésicos.      Lesión de arsenio grave    Si tiene velia lesión de arsenio grave, el tratamiento puede incluir lo siguiente:•Observación minuciosa. [place] incluye hospitalización con la siguiente atención:  •Exámenes físicos frecuentes.      •Controles frecuentes del funcionamiento del cerebro y el sistema nervioso (estado neurológico).      •Control de la presión arterial y los niveles de oxígeno.        •Medicamentos para aliviar el dolor, prevenir las convulsiones y disminuir la hinchazón del cerebro.      •Protección de las vías respiratorias y asistencia respiratoria. [place] puede incluir un respirador.      •Tratamientos para controlar y tratar la hinchazón dentro del cerebro.    •Cirugía de cerebro. Esta puede ser necesaria en los siguientes casos:  •Extraer velia acumulación de montrell o coágulos de montrell.      •Interrumpir el sangrado.      •Retirar velia parte del cráneo para dejar espacio a fin de que el cerebro se hinche.          Siga estas instrucciones en carbajal casa:    Actividad     •Descanse y evite actividades que gisell físicamente difíciles o agotadoras.      •Asegúrese de dormir lo suficiente.    •Deje que el cerebro descanse limitando las actividades que requieran pensar mucho o prestar atención, charisse las siguientes:  •Mirar televisión.      •Jugar juegos de memoria y armar rompecabezas.      •Tareas para el hogar o trabajos relacionados con el empleo.      •Trabajar en la computadora, usar las redes sociales y enviar mensajes de texto.        •Evite actividades que puedan causar otra lesión en la arsenio, charisse practicar deportes, hasta que el médico lo autorice. Puede ser peligroso tener otra lesión en la arsenio antes de que se haya recuperado de la primera.      •Pregúntele al médico cuándo puede retomar renu actividades habituales, incluidos el trabajo o el estudio. Pídale al médico un plan escalonado para retomar renu actividades de forma gradual.      •Consulte a carbajal médico sobre cuándo puede conducir, andar en bicicleta o usar maquinaria pesada. La capacidad para reaccionar puede ser más lenta luego de velia lesión cerebral. No realice estas actividades si se siente mareado.        Estilo de marco      • No martha alcohol hasta que el médico lo autorice. No consuma drogas. El alcohol y ciertas drogas pueden demorar carbajal recuperación y ponerlo en riesgo de nuevas lesiones.      •Si le resulta más difícil que lo habitual recordar las cosas, escríbalas.      •Trate de hacer velia cosa por vez si se distrae con facilidad.      •Consulte con familiares y amigos si debe calin decisiones importantes.      •Cuénteles sobre carbajal lesión, los síntomas y las restricciones a renu amigos, a carbajal carole, a un colega de confianza y a carbajal gerente en el trabajo. Pídales que observen si aparecen nuevos problemas o empeoran los existentes.      Instrucciones generales     •Cool los medicamentos de venta myla y los recetados solamente charisse se lo haya indicado el médico.      •Pídale a alguien que lo acompañe hiwot 24 horas después de la lesión en la arsenio. Esta persona debe observar cambios en los síntomas y estar preparada para obtener ayuda médica.      •Concurra a todas las visitas de seguimiento charisse se lo haya indicado el médico. [place] es importante.        ¿Cómo se vicki?    •Trabaje para mejorar el equilibrio y la fuerza a fin de evitar caídas.      •Use el cinturón de seguridad cuando se encuentre en un vehículo en movimiento.      •Use un calr al andar en bicicleta, esquiar o practicar cualquier otro deporte o actividad que tenga riesgo de lesiones.    •Si jordan alcohol:•Limite la cantidad que jordan:  •De 0 a 1 medida por día para las mujeres que no estén embarazadas.      •De 0 a 2 medidas por día para los hombres.        •Esté atento a la cantidad de alcohol que hay en las bebidas que lavonne. En los [place], velia medida equivale a velia botella de cerveza de 12 oz (355 ml), un vaso de vino de 5 oz (148 ml) o un vaso de velia bebida alcohólica de don graduación de 1½ oz (44 ml).      •Cool medidas de seguridad en carbajal hogar, por ejemplo:  •Mantenga los pisos y las escaleras en orden.      •Coloque barras para sostén en los cheri y pasamanos en las escaleras.      •Ponga alfombras antideslizantes en pisos y bañeras.      •Mejore la iluminación en las zonas de penumbra.          Dónde buscar más información    •Centers for Disease Control and Prevention (Centros para el Control y la Prevención de Enfermedades): www.cdc.gov        Solicite ayuda de inmediato si:  •Tiene lo siguiente:  •Dolor de arsenio intenso que no desaparece con los medicamentos.      •Dificultad para caminar o debilidad en los brazos o las piernas.      •Secreción transparente o con montrell que proviene de la nariz o de los oídos.      •Cambios en la visión.      •Velia convulsión.      •Mayor confusión o irritabilidad.        •Renu síntomas empeoran.      •Está más somnoliento de lo normal o tiene dificultad para mantenerse despierto.      •Pierde el equilibrio.      •Las pupilas cambian de tamaño.      •Arrastra las palabras.      •Carbajal mareo empeora.      •Vomita.      Estos síntomas pueden representar un problema grave que constituye velia emergencia. No espere a adán si los síntomas desaparecen. Solicite atención médica de inmediato. Comuníquese con el servicio de emergencias de carbajal localidad (911 en los [place]). No conduzca por renu propios medios hasta el hospital.       Resumen    •Las lesiones en la arsenio pueden ser leves o pueden ser un problema médico grave que requiere atención inmediata.      •El tratamiento de esta afección depende de la gravedad y el tipo de lesión que sufrió.      •Pídale a alguien que lo acompañe hiwot 24 horas después de la lesión y que donnell cómo está usted con frecuencia.      •Pregúntele al médico cuándo puede retomar renu actividades habituales, incluidos el trabajo o el estudio.      •La prevención de lesiones en la arsenio incluye el uso de cinturón de seguridad en un vehículo motorizado, el uso de carl en bicicleta, limitar el consumo de alcohol y calin medidas de seguridad en el hogar.      Esta información no tiene charisse fin reemplazar el consejo del médico. Asegúrese de hacerle al médico cualquier pregunta que tenga.        Dolor en la pared torácica    Chest Wall Pain      El dolor en la pared torácica se produce en los huesos y los músculos del pecho o alrededor de estos. A veces, velia lesión causa jocelyne dolor. La tos en exceso o el uso excesivo de los músculos de los brazos y del pecho también pueden causar dolor en la pared torácica. En ocasiones, la causa puede ser desconocida. Jocelyne dolor puede durar varias semanas.      Siga estas indicaciones en carbajal casa:      Control del dolor, la rigidez y la hinchazón   A bag of ice on a towel on the skin. •Si se lo indican, aplique hielo sobre la conner del dolor:  •Ponga el hielo en velia bolsa plástica.      •Coloque velia toalla entre la piel y la bolsa.      •Coloque el hielo hiwot 20 minutos, 2 a 3 veces al día.        Actividad     •Sagrario reposo charisse se lo haya indicado el médico.      •Evite las actividades que le causan dolor. Estas pueden ser aquellas que requieren el uso de los músculos del tórax, los abdominales o los laterales para levantar objetos pesados. Pregúntele al médico qué actividades son seguras para usted.        Instrucciones generales   A do not smoke cigarettes sign.   •Use los medicamentos de venta myla y los recetados solamente charisse se lo haya indicado el médico.      • No consuma ningún producto que contenga nicotina o tabaco, charisse cigarrillos, cigarrillos electrónicos y tabaco de mascar. Estos pueden retrasar la cicatrización después de velia lesión. Si necesita ayuda para dejar de fumar, consulte al médico.      •Concurra a todas las visitas de seguimiento charisse se lo haya indicado el médico. [place] es importante.        Comuníquese con un médico si:    •Tiene fiebre.      •El dolor de pecho empeora.      •Aparecen nuevos síntomas.        Solicite ayuda inmediatamente si:    •Tiene náuseas o vómitos.      •Transpira o tiene sensación de desvanecimiento.      •Tiene tos con mucosidad de los pulmones (esputo) o expectora montrell al toser.      •Le falta el aire.      Estos síntomas pueden representar un problema grave que constituye velia emergencia. No espere a adán si los síntomas desaparecen. Solicite atención médica de inmediato. Comuníquese con el servicio de emergencias de carbaajl localidad (911 en los [place]). No conduzca por renu propios medios hasta el hospital.       Resumen    •El dolor en la pared torácica se produce en los huesos y los músculos del pecho o alrededor de estos.      •Según la causa, el tratamiento consistirá en la aplicación de hielo, reposo, medicamentos y la suspensión de las actividades que causan dolor.      •Comuníquese con un médico si tiene fiebre, dolor en el pecho que empeora o síntomas nuevos.      •Solicite ayuda de inmediato si siente que se va a desvanecer o le falta el aire. Estos síntomas pueden indicar velia emergencia.      Esta información no tiene charisse fin reemplazar el consejo del médico. Asegúrese de hacerle al médico cualquier pregunta que tenga.

## 2022-10-16 NOTE — ED PROVIDER NOTE - PATIENT PORTAL LINK FT
You can access the FollowMyHealth Patient Portal offered by [facility] by registering at the following website: [email or web address]. By joining [employer]’s FollowMyHealth portal, you will also be able to view your health information using other applications (apps) compatible with our system.
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
## 2022-10-16 NOTE — ED PROVIDER NOTE - CHPI ED SYMPTOMS NEG
no difficulty with speech, abdominal pain, spinal pain or neck pain, vomiting/no loss of consciousness/no difficulty bearing weight

## 2022-10-16 NOTE — ED PROVIDER NOTE - SKIN, MLM
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  See pathology results.  Referral placed.   pt showing part of her skin on the chest where she feels the burning, do not note ecchymosis or erythema.

## 2022-11-16 ENCOUNTER — NON-APPOINTMENT (OUTPATIENT)
Age: 57
End: 2022-11-16

## 2023-01-04 ENCOUNTER — EMERGENCY (EMERGENCY)
Facility: HOSPITAL | Age: 58
LOS: 1 days | Discharge: HOME | End: 2023-01-04
Attending: EMERGENCY MEDICINE | Admitting: EMERGENCY MEDICINE
Payer: COMMERCIAL

## 2023-01-04 VITALS
TEMPERATURE: 98 F | OXYGEN SATURATION: 99 % | RESPIRATION RATE: 18 BRPM | DIASTOLIC BLOOD PRESSURE: 72 MMHG | SYSTOLIC BLOOD PRESSURE: 118 MMHG | HEART RATE: 85 BPM

## 2023-01-04 VITALS
WEIGHT: 268.08 LBS | HEART RATE: 86 BPM | RESPIRATION RATE: 20 BRPM | TEMPERATURE: 99 F | HEIGHT: 64 IN | SYSTOLIC BLOOD PRESSURE: 106 MMHG | DIASTOLIC BLOOD PRESSURE: 64 MMHG | OXYGEN SATURATION: 98 %

## 2023-01-04 PROBLEM — E11.9 TYPE 2 DIABETES MELLITUS WITHOUT COMPLICATIONS: Chronic | Status: ACTIVE | Noted: 2022-10-19

## 2023-01-04 LAB
ALBUMIN SERPL ELPH-MCNC: 3.1 G/DL — LOW (ref 3.3–5)
ALP SERPL-CCNC: 83 U/L — SIGNIFICANT CHANGE UP (ref 30–120)
ALT FLD-CCNC: 23 U/L DA — SIGNIFICANT CHANGE UP (ref 10–60)
ANION GAP SERPL CALC-SCNC: 10 MMOL/L — SIGNIFICANT CHANGE UP (ref 5–17)
APPEARANCE UR: ABNORMAL
AST SERPL-CCNC: 21 U/L — SIGNIFICANT CHANGE UP (ref 10–40)
BASOPHILS # BLD AUTO: 0.03 K/UL — SIGNIFICANT CHANGE UP (ref 0–0.2)
BASOPHILS NFR BLD AUTO: 0.3 % — SIGNIFICANT CHANGE UP (ref 0–2)
BILIRUB SERPL-MCNC: 0.5 MG/DL — SIGNIFICANT CHANGE UP (ref 0.2–1.2)
BILIRUB UR-MCNC: ABNORMAL
BUN SERPL-MCNC: 11 MG/DL — SIGNIFICANT CHANGE UP (ref 7–23)
CALCIUM SERPL-MCNC: 9.4 MG/DL — SIGNIFICANT CHANGE UP (ref 8.4–10.5)
CHLORIDE SERPL-SCNC: 101 MMOL/L — SIGNIFICANT CHANGE UP (ref 96–108)
CO2 SERPL-SCNC: 28 MMOL/L — SIGNIFICANT CHANGE UP (ref 22–31)
COLOR SPEC: YELLOW — SIGNIFICANT CHANGE UP
CREAT SERPL-MCNC: 1.02 MG/DL — SIGNIFICANT CHANGE UP (ref 0.5–1.3)
DIFF PNL FLD: NEGATIVE — SIGNIFICANT CHANGE UP
EGFR: 65 ML/MIN/1.73M2 — SIGNIFICANT CHANGE UP
EOSINOPHIL # BLD AUTO: 0.21 K/UL — SIGNIFICANT CHANGE UP (ref 0–0.5)
EOSINOPHIL NFR BLD AUTO: 1.8 % — SIGNIFICANT CHANGE UP (ref 0–6)
FLUAV AG NPH QL: SIGNIFICANT CHANGE UP
FLUBV AG NPH QL: SIGNIFICANT CHANGE UP
GLUCOSE SERPL-MCNC: 130 MG/DL — HIGH (ref 70–99)
GLUCOSE UR QL: NEGATIVE MG/DL — SIGNIFICANT CHANGE UP
HCG SERPL-ACNC: 2 MIU/ML — SIGNIFICANT CHANGE UP
HCT VFR BLD CALC: 36.4 % — SIGNIFICANT CHANGE UP (ref 34.5–45)
HGB BLD-MCNC: 10.9 G/DL — LOW (ref 11.5–15.5)
IMM GRANULOCYTES NFR BLD AUTO: 0.5 % — SIGNIFICANT CHANGE UP (ref 0–0.9)
KETONES UR-MCNC: ABNORMAL
LACTATE SERPL-SCNC: 1.7 MMOL/L — SIGNIFICANT CHANGE UP (ref 0.7–2)
LEUKOCYTE ESTERASE UR-ACNC: ABNORMAL
LIDOCAIN IGE QN: 61 U/L — LOW (ref 73–393)
LYMPHOCYTES # BLD AUTO: 2.65 K/UL — SIGNIFICANT CHANGE UP (ref 1–3.3)
LYMPHOCYTES # BLD AUTO: 23.2 % — SIGNIFICANT CHANGE UP (ref 13–44)
MCHC RBC-ENTMCNC: 21.5 PG — LOW (ref 27–34)
MCHC RBC-ENTMCNC: 29.9 GM/DL — LOW (ref 32–36)
MCV RBC AUTO: 71.7 FL — LOW (ref 80–100)
MONOCYTES # BLD AUTO: 0.67 K/UL — SIGNIFICANT CHANGE UP (ref 0–0.9)
MONOCYTES NFR BLD AUTO: 5.9 % — SIGNIFICANT CHANGE UP (ref 2–14)
NEUTROPHILS # BLD AUTO: 7.82 K/UL — HIGH (ref 1.8–7.4)
NEUTROPHILS NFR BLD AUTO: 68.3 % — SIGNIFICANT CHANGE UP (ref 43–77)
NITRITE UR-MCNC: NEGATIVE — SIGNIFICANT CHANGE UP
NRBC # BLD: 0 /100 WBCS — SIGNIFICANT CHANGE UP (ref 0–0)
PH UR: 6.5 — SIGNIFICANT CHANGE UP (ref 5–8)
PLATELET # BLD AUTO: 370 K/UL — SIGNIFICANT CHANGE UP (ref 150–400)
POTASSIUM SERPL-MCNC: 2.8 MMOL/L — CRITICAL LOW (ref 3.5–5.3)
POTASSIUM SERPL-SCNC: 2.8 MMOL/L — CRITICAL LOW (ref 3.5–5.3)
PROT SERPL-MCNC: 7.6 G/DL — SIGNIFICANT CHANGE UP (ref 6–8.3)
PROT UR-MCNC: 100 MG/DL
RBC # BLD: 5.08 M/UL — SIGNIFICANT CHANGE UP (ref 3.8–5.2)
RBC # FLD: 19.1 % — HIGH (ref 10.3–14.5)
RSV RNA NPH QL NAA+NON-PROBE: SIGNIFICANT CHANGE UP
SARS-COV-2 RNA SPEC QL NAA+PROBE: SIGNIFICANT CHANGE UP
SODIUM SERPL-SCNC: 139 MMOL/L — SIGNIFICANT CHANGE UP (ref 135–145)
SP GR SPEC: 1.01 — SIGNIFICANT CHANGE UP (ref 1.01–1.02)
UROBILINOGEN FLD QL: NEGATIVE MG/DL — SIGNIFICANT CHANGE UP
WBC # BLD: 11.44 K/UL — HIGH (ref 3.8–10.5)
WBC # FLD AUTO: 11.44 K/UL — HIGH (ref 3.8–10.5)

## 2023-01-04 PROCEDURE — 74177 CT ABD & PELVIS W/CONTRAST: CPT | Mod: 26,MA

## 2023-01-04 PROCEDURE — 80053 COMPREHEN METABOLIC PANEL: CPT

## 2023-01-04 PROCEDURE — 96365 THER/PROPH/DIAG IV INF INIT: CPT | Mod: 59

## 2023-01-04 PROCEDURE — 83690 ASSAY OF LIPASE: CPT

## 2023-01-04 PROCEDURE — 85025 COMPLETE CBC W/AUTO DIFF WBC: CPT

## 2023-01-04 PROCEDURE — 36415 COLL VENOUS BLD VENIPUNCTURE: CPT

## 2023-01-04 PROCEDURE — 99284 EMERGENCY DEPT VISIT MOD MDM: CPT | Mod: 25

## 2023-01-04 PROCEDURE — 99285 EMERGENCY DEPT VISIT HI MDM: CPT

## 2023-01-04 PROCEDURE — 84702 CHORIONIC GONADOTROPIN TEST: CPT

## 2023-01-04 PROCEDURE — 83605 ASSAY OF LACTIC ACID: CPT

## 2023-01-04 PROCEDURE — 96360 HYDRATION IV INFUSION INIT: CPT | Mod: 59

## 2023-01-04 PROCEDURE — 74177 CT ABD & PELVIS W/CONTRAST: CPT | Mod: MA

## 2023-01-04 PROCEDURE — 96366 THER/PROPH/DIAG IV INF ADDON: CPT

## 2023-01-04 PROCEDURE — 81001 URINALYSIS AUTO W/SCOPE: CPT

## 2023-01-04 PROCEDURE — 87637 SARSCOV2&INF A&B&RSV AMP PRB: CPT

## 2023-01-04 RX ORDER — ALBUTEROL 90 UG/1
0 AEROSOL, METERED ORAL
Qty: 0 | Refills: 0 | DISCHARGE

## 2023-01-04 RX ORDER — METRONIDAZOLE 500 MG
1 TABLET ORAL
Qty: 14 | Refills: 0
Start: 2023-01-04 | End: 2023-01-10

## 2023-01-04 RX ORDER — POTASSIUM CHLORIDE 20 MEQ
10 PACKET (EA) ORAL ONCE
Refills: 0 | Status: COMPLETED | OUTPATIENT
Start: 2023-01-04 | End: 2023-01-04

## 2023-01-04 RX ORDER — POTASSIUM CHLORIDE 20 MEQ
40 PACKET (EA) ORAL ONCE
Refills: 0 | Status: COMPLETED | OUTPATIENT
Start: 2023-01-04 | End: 2023-01-04

## 2023-01-04 RX ORDER — NABUMETONE 750 MG
2 TABLET ORAL
Qty: 0 | Refills: 0 | DISCHARGE

## 2023-01-04 RX ORDER — ATORVASTATIN CALCIUM 80 MG/1
1 TABLET, FILM COATED ORAL
Qty: 0 | Refills: 0 | DISCHARGE

## 2023-01-04 RX ORDER — BUDESONIDE AND FORMOTEROL FUMARATE DIHYDRATE 160; 4.5 UG/1; UG/1
2 AEROSOL RESPIRATORY (INHALATION)
Qty: 0 | Refills: 0 | DISCHARGE

## 2023-01-04 RX ORDER — ATORVASTATIN CALCIUM 80 MG/1
0 TABLET, FILM COATED ORAL
Qty: 0 | Refills: 0 | DISCHARGE

## 2023-01-04 RX ORDER — MECLIZINE HCL 12.5 MG
0 TABLET ORAL
Qty: 0 | Refills: 0 | DISCHARGE

## 2023-01-04 RX ORDER — NABUMETONE 750 MG
1 TABLET ORAL
Qty: 0 | Refills: 0 | DISCHARGE

## 2023-01-04 RX ORDER — ASPIRIN/CALCIUM CARB/MAGNESIUM 324 MG
1 TABLET ORAL
Qty: 0 | Refills: 0 | DISCHARGE

## 2023-01-04 RX ORDER — POTASSIUM CHLORIDE 20 MEQ
2 PACKET (EA) ORAL
Qty: 8 | Refills: 0
Start: 2023-01-04 | End: 2023-01-05

## 2023-01-04 RX ORDER — ALBUTEROL 90 UG/1
2 AEROSOL, METERED ORAL
Qty: 0 | Refills: 0 | DISCHARGE

## 2023-01-04 RX ORDER — ASPIRIN/CALCIUM CARB/MAGNESIUM 324 MG
0 TABLET ORAL
Qty: 0 | Refills: 0 | DISCHARGE

## 2023-01-04 RX ORDER — PROPRANOLOL HCL 160 MG
1 CAPSULE, EXTENDED RELEASE 24HR ORAL
Qty: 0 | Refills: 0 | DISCHARGE

## 2023-01-04 RX ORDER — SODIUM CHLORIDE 9 MG/ML
1000 INJECTION INTRAMUSCULAR; INTRAVENOUS; SUBCUTANEOUS
Refills: 0 | Status: COMPLETED | OUTPATIENT
Start: 2023-01-04 | End: 2023-01-04

## 2023-01-04 RX ORDER — LOSARTAN POTASSIUM 100 MG/1
1 TABLET, FILM COATED ORAL
Qty: 0 | Refills: 0 | DISCHARGE

## 2023-01-04 RX ORDER — LOSARTAN POTASSIUM 100 MG/1
0 TABLET, FILM COATED ORAL
Qty: 0 | Refills: 0 | DISCHARGE

## 2023-01-04 RX ORDER — LACTOBACILLUS ACIDOPHILUS 100MM CELL
2 CAPSULE ORAL
Qty: 28 | Refills: 0
Start: 2023-01-04 | End: 2023-01-10

## 2023-01-04 RX ADMIN — SODIUM CHLORIDE 1000 MILLILITER(S): 9 INJECTION INTRAMUSCULAR; INTRAVENOUS; SUBCUTANEOUS at 18:00

## 2023-01-04 RX ADMIN — SODIUM CHLORIDE 1000 MILLILITER(S): 9 INJECTION INTRAMUSCULAR; INTRAVENOUS; SUBCUTANEOUS at 16:48

## 2023-01-04 RX ADMIN — Medication 30 MILLILITER(S): at 19:12

## 2023-01-04 RX ADMIN — SODIUM CHLORIDE 1000 MILLILITER(S): 9 INJECTION INTRAMUSCULAR; INTRAVENOUS; SUBCUTANEOUS at 17:36

## 2023-01-04 RX ADMIN — Medication 100 MILLIEQUIVALENT(S): at 18:18

## 2023-01-04 RX ADMIN — Medication 40 MILLIEQUIVALENT(S): at 18:17

## 2023-01-04 RX ADMIN — Medication 10 MILLIEQUIVALENT(S): at 19:36

## 2023-01-04 NOTE — ED PROVIDER NOTE - NSFOLLOWUPINSTRUCTIONS_ED_ALL_ED_FT
1. Follow-up with your Primary Medical Doctor , Dr Franco. Call tomorrow for prompt follow-up.  2. Return to Emergency room for any worsening or persistent pain, weakness, fever, vomiting, diarrhea, unable to eat / drink, weak or dizzy, or any other concerning symptoms.  3. See attached instruction sheets for additional information, including information regarding signs and symptoms to look out for, reasons to seek immediate care and other important instructions.  4.  Plenty of fluids  5.  Flagyl twice daily for 7 days  6.  Zofran as needed for nausea  7.  Lactobacillus twice daily for 7 days  8. Potassium twice daily for 2 days    1. Seguimiento con carbajal médico de cabecera, el Dr. Franco. Llame mañana para un seguimiento rápido.  2. Regrese a la cory de emergencias por cualquier empeoramiento o dolor persistente, debilidad, fiebre, vómitos, diarrea, incapacidad para comer/beber, debilidad o mareos, o cualquier otro síntoma preocupante.  3. Consulte las hojas de instrucciones adjuntas para obtener información adicional, incluida información sobre los signos y síntomas a los que debe prestar atención, los motivos para buscar atención inmediata y otras instrucciones importantes.  4. Mucho líquido  5. Flagyl dos veces al día hiwot 7 días  6. Zofran según sea necesario para las náuseas  7. Lactobacillus dos veces al día hiwot 7 días  8. Potasio dos veces al día hiwot 2 días

## 2023-01-04 NOTE — ED ADULT NURSE REASSESSMENT NOTE - NS ED NURSE REASSESS COMMENT FT1
PT states that pain has improved s/p Maalox.  Safety maintained.
report received from Kunal STALLINGS .  Pt is resting, c/o epigastric pain.  Pt currently receiving IV K rider, IV side intact, no signs of erythema.  Pt provided with medications as per MD order.
Pt able to tolerate PO intake - No vomiting and diarrhea noted. Pt revisited. Pt seen and re-examined by Dr Galdamez . Pt medicated as ordered.

## 2023-01-04 NOTE — ED PROVIDER NOTE - OBJECTIVE STATEMENT
57-year-old female with history of hypertension, prediabetes, hypercholesterolemia, asthma, status post hernia repair 10 years ago, status post cholecystectomy 7 years ago presents with nausea, on and off vomiting with loose watery nonbloody diarrhea for the past 5 days.  Patient complains of persistent periumbilical/supraumbilical abdominal pain.  No chest pain or shortness of breath.  No dyspnea on exertion has easy fatigue.  No known suspect foods.  No known sick contacts.  No cough/URI.  No known COVID exposures.  Patient previously vaccinated for COVID.  No aggravating or alleviating factors otherwise noted.  No other acute complaints at this time.

## 2023-01-04 NOTE — ED PROVIDER NOTE - NSICDXPASTMEDICALHX_GEN_ALL_CORE_FT
PAST MEDICAL HISTORY:  Asthma     Benign Essential Hypertension     DM (diabetes mellitus)     GERD (Gastroesophageal Reflux Disease)     HTN (hypertension)     MVP (mitral valve prolapse)     Right-sided carotid artery disease

## 2023-01-04 NOTE — ED PROVIDER NOTE - DIFFERENTIAL DIAGNOSIS
Rule out acute abdominal infection, appendicitis, colitis, pancreatitis, enteritis or other acute pathology. Differential Diagnosis

## 2023-01-04 NOTE — ED PROVIDER NOTE - PATIENT PORTAL LINK FT
You can access the FollowMyHealth Patient Portal offered by [facility] by registering at the following website: [email or web address]. By joining [employer]’s FollowMyHealth portal, you will also be able to view your health information using other applications (apps) compatible with our system.

## 2023-01-04 NOTE — ED ADULT NURSE NOTE - OBJECTIVE STATEMENT
Pt came in for vomiting , diarrhea and frequent abdominal pain x 5 days now. Pt denies any fever of chills

## 2023-01-04 NOTE — ED PROVIDER NOTE - CLINICAL SUMMARY MEDICAL DECISION MAKING FREE TEXT BOX
Abdominal pain with vomiting and diarrhea for 5 days, no known sick contacts.  We will check labs, CT, IV fluids, reeval.

## 2023-01-22 ENCOUNTER — EMERGENCY (EMERGENCY)
Facility: HOSPITAL | Age: 58
LOS: 1 days | Discharge: HOME | End: 2023-01-22
Attending: EMERGENCY MEDICINE | Admitting: EMERGENCY MEDICINE
Payer: COMMERCIAL

## 2023-01-22 VITALS
RESPIRATION RATE: 18 BRPM | SYSTOLIC BLOOD PRESSURE: 132 MMHG | OXYGEN SATURATION: 98 % | HEART RATE: 78 BPM | TEMPERATURE: 98 F | DIASTOLIC BLOOD PRESSURE: 78 MMHG

## 2023-01-22 VITALS
HEART RATE: 95 BPM | SYSTOLIC BLOOD PRESSURE: 134 MMHG | DIASTOLIC BLOOD PRESSURE: 78 MMHG | OXYGEN SATURATION: 98 % | RESPIRATION RATE: 18 BRPM | HEIGHT: 64 IN | WEIGHT: 266.1 LBS | TEMPERATURE: 98 F

## 2023-01-22 LAB
ALBUMIN SERPL ELPH-MCNC: 3 G/DL — LOW (ref 3.3–5)
ALP SERPL-CCNC: 73 U/L — SIGNIFICANT CHANGE UP (ref 30–120)
ALT FLD-CCNC: 30 U/L DA — SIGNIFICANT CHANGE UP (ref 10–60)
ANION GAP SERPL CALC-SCNC: 6 MMOL/L — SIGNIFICANT CHANGE UP (ref 5–17)
AST SERPL-CCNC: 18 U/L — SIGNIFICANT CHANGE UP (ref 10–40)
BASOPHILS # BLD AUTO: 0.03 K/UL — SIGNIFICANT CHANGE UP (ref 0–0.2)
BASOPHILS NFR BLD AUTO: 0.3 % — SIGNIFICANT CHANGE UP (ref 0–2)
BILIRUB SERPL-MCNC: 0.6 MG/DL — SIGNIFICANT CHANGE UP (ref 0.2–1.2)
BUN SERPL-MCNC: 13 MG/DL — SIGNIFICANT CHANGE UP (ref 7–23)
CALCIUM SERPL-MCNC: 9.2 MG/DL — SIGNIFICANT CHANGE UP (ref 8.4–10.5)
CHLORIDE SERPL-SCNC: 102 MMOL/L — SIGNIFICANT CHANGE UP (ref 96–108)
CK SERPL-CCNC: 29 U/L — SIGNIFICANT CHANGE UP (ref 26–192)
CO2 SERPL-SCNC: 31 MMOL/L — SIGNIFICANT CHANGE UP (ref 22–31)
CREAT SERPL-MCNC: 0.82 MG/DL — SIGNIFICANT CHANGE UP (ref 0.5–1.3)
EGFR: 84 ML/MIN/1.73M2 — SIGNIFICANT CHANGE UP
EOSINOPHIL # BLD AUTO: 0.12 K/UL — SIGNIFICANT CHANGE UP (ref 0–0.5)
EOSINOPHIL NFR BLD AUTO: 1.3 % — SIGNIFICANT CHANGE UP (ref 0–6)
GLUCOSE SERPL-MCNC: 134 MG/DL — HIGH (ref 70–99)
HCT VFR BLD CALC: 33.5 % — LOW (ref 34.5–45)
HGB BLD-MCNC: 9.7 G/DL — LOW (ref 11.5–15.5)
IMM GRANULOCYTES NFR BLD AUTO: 0.2 % — SIGNIFICANT CHANGE UP (ref 0–0.9)
LYMPHOCYTES # BLD AUTO: 2.55 K/UL — SIGNIFICANT CHANGE UP (ref 1–3.3)
LYMPHOCYTES # BLD AUTO: 27.7 % — SIGNIFICANT CHANGE UP (ref 13–44)
MCHC RBC-ENTMCNC: 21.4 PG — LOW (ref 27–34)
MCHC RBC-ENTMCNC: 29 GM/DL — LOW (ref 32–36)
MCV RBC AUTO: 73.8 FL — LOW (ref 80–100)
MONOCYTES # BLD AUTO: 0.63 K/UL — SIGNIFICANT CHANGE UP (ref 0–0.9)
MONOCYTES NFR BLD AUTO: 6.8 % — SIGNIFICANT CHANGE UP (ref 2–14)
NEUTROPHILS # BLD AUTO: 5.87 K/UL — SIGNIFICANT CHANGE UP (ref 1.8–7.4)
NEUTROPHILS NFR BLD AUTO: 63.7 % — SIGNIFICANT CHANGE UP (ref 43–77)
NRBC # BLD: 0 /100 WBCS — SIGNIFICANT CHANGE UP (ref 0–0)
PLATELET # BLD AUTO: 358 K/UL — SIGNIFICANT CHANGE UP (ref 150–400)
POTASSIUM SERPL-MCNC: 3.6 MMOL/L — SIGNIFICANT CHANGE UP (ref 3.5–5.3)
POTASSIUM SERPL-SCNC: 3.6 MMOL/L — SIGNIFICANT CHANGE UP (ref 3.5–5.3)
PROT SERPL-MCNC: 7.1 G/DL — SIGNIFICANT CHANGE UP (ref 6–8.3)
RBC # BLD: 4.54 M/UL — SIGNIFICANT CHANGE UP (ref 3.8–5.2)
RBC # FLD: 19.5 % — HIGH (ref 10.3–14.5)
SODIUM SERPL-SCNC: 139 MMOL/L — SIGNIFICANT CHANGE UP (ref 135–145)
WBC # BLD: 9.22 K/UL — SIGNIFICANT CHANGE UP (ref 3.8–10.5)
WBC # FLD AUTO: 9.22 K/UL — SIGNIFICANT CHANGE UP (ref 3.8–10.5)

## 2023-01-22 PROCEDURE — 82550 ASSAY OF CK (CPK): CPT

## 2023-01-22 PROCEDURE — 93971 EXTREMITY STUDY: CPT | Mod: 26,LT

## 2023-01-22 PROCEDURE — 36415 COLL VENOUS BLD VENIPUNCTURE: CPT

## 2023-01-22 PROCEDURE — 93971 EXTREMITY STUDY: CPT

## 2023-01-22 PROCEDURE — 99284 EMERGENCY DEPT VISIT MOD MDM: CPT

## 2023-01-22 PROCEDURE — 80053 COMPREHEN METABOLIC PANEL: CPT

## 2023-01-22 PROCEDURE — 85025 COMPLETE CBC W/AUTO DIFF WBC: CPT

## 2023-01-22 RX ORDER — ASPIRIN/CALCIUM CARB/MAGNESIUM 324 MG
1 TABLET ORAL
Qty: 0 | Refills: 0 | DISCHARGE

## 2023-01-22 RX ORDER — ACETAMINOPHEN 500 MG
975 TABLET ORAL ONCE
Refills: 0 | Status: COMPLETED | OUTPATIENT
Start: 2023-01-22 | End: 2023-01-22

## 2023-01-22 RX ORDER — LOSARTAN/HYDROCHLOROTHIAZIDE 100MG-25MG
1 TABLET ORAL
Qty: 0 | Refills: 0 | DISCHARGE

## 2023-01-22 RX ADMIN — Medication 975 MILLIGRAM(S): at 09:58

## 2023-01-22 RX ADMIN — Medication 975 MILLIGRAM(S): at 09:13

## 2023-01-22 NOTE — ED ADULT NURSE NOTE - OBJECTIVE STATEMENT
56 y/o F PMH asthma, DM, HLD, HTN, MVP, presenting to ED for LLE pain and tenderness x 1 day. No trauma. Endorsing tingling and pain like numbness in dorsal aspect of left foot as well as pain in first two digits of left foot. Pt on statin. No overlying skin changes. No recent travel, OCP use, smoking hx, recent surgery, DVT or PE hx. Denies CP, SOB, n/v/d, fevers, chills, abdominal pain, urinary symptoms, weakness, fatigue, HA, blurry vision. VSS updated on plan of care.

## 2023-01-22 NOTE — ED PROVIDER NOTE - CARE PROVIDER_API CALL
Marissa Arango)  Internal Medicine  [address] [place] [address]  Phone: [phone]  Fax: [phone]  Follow Up Time: 1-3 Days    Kirby Wills)  Orthopedics  [address]  [place] [address]  Phone: [phone]  Fax: [phone]  Follow Up Time: 1-3 Days

## 2023-01-22 NOTE — ED PROVIDER NOTE - OBJECTIVE STATEMENT
Family  per patient request  Patient complaining of posterior left knee pain and calf pain.  Patient denies trauma fevers chills weakness numbness chest pain short of breath or any other complaints.  No history of DVT or PE.  No recent travel.  PMD in [place]

## 2023-01-22 NOTE — ED PROVIDER NOTE - DIFFERENTIAL DIAGNOSIS
Differential including but not limited to DVT electrolyte abnormality rhabdo or muscle sprain Differential Diagnosis

## 2023-01-22 NOTE — ED PROVIDER NOTE - PROVIDER TOKENS
PROVIDER:[TOKEN:[[id number]],FOLLOWUP:[1-3 Days]],PROVIDER:[TOKEN:[[id number]],FOLLOWUP:[1-3 Days]]

## 2023-01-22 NOTE — ED PROVIDER NOTE - CLINICAL SUMMARY MEDICAL DECISION MAKING FREE TEXT BOX
Family  per patient request  Patient complaining of posterior left knee pain and calf pain.  Patient denies trauma fevers chills weakness numbness chest pain short of breath or any other complaints.  No history of DVT or PE.  No recent travel.  PMD in [place]    Plan lower extremity Doppler labs including CBC CMP and total CK tylenol for pain  Differential including but not limited to DVT electrolyte abnormality rhabdo or muscle sprain

## 2023-01-22 NOTE — ED PROVIDER NOTE - NSFOLLOWUPINSTRUCTIONS_ED_ALL_ED_FT
LEG PAIN - AfterCare(R) Instructions(ER/ED)   Dolor de piernas    LO QUE NECESITA SABER:    El dolor de piernas puede ser causado por velia variedad de afecciones de vane. Renu exámenes no mostraron ningún hueso roto ni coágulos de montrell.    INSTRUCCIONES SOBRE EL [facility]ITAL[facility]:    Regrese a la cory de emergencias si:  •Tiene fiebre.    •Carbajal pierna empieza a inflamarse    •Carbajal dolor de pierna empeora.    •Usted tiene entumecimiento o cosquilleo en la pierna o los dedos del pie.    •Usted es incapaz de  o soportar nada de peso sobre carbajal pierna.    Comuníquese con carbajal médico si:  •Carbajal dolor no disminuye, aún después del tratamiento.    •Usted tiene preguntas o inquietudes acerca de carbajal condición o cuidado.    Medicamentos:  •AINEcomo el ibuprofeno, ayudan a disminuir la inflamación, el dolor y la fiebre. David medicamento está disponible con o sin velia receta médica. Los ERICK pueden causar sangrado estomacal o problemas renales en ciertas personas. Si usted lavonne un medicamento anticoagulante, siempre pregúntele a carbajal médico si los ERICK son seguros para usted. Siempre caroline la etiqueta de david medicamento y siga las instrucciones.    •Ester renu medicamentos charisse se le haya indicado.Consulte con carbajal médico si usted sherwin que carbajal medicamento no le está ayudando o si presenta efectos secundarios. Infórmele al médico si usted es alérgico a algún medicamento. Mantenga velia lista actualizada de los medicamentos, las vitaminas y los productos herbales que lavonne. Incluya los siguientes datos de los medicamentos: cantidad, frecuencia y motivo de administración. Traiga con usted la lista o los envases de las píldoras a renu citas de seguimiento. Lleve la lista de los medicamentos con usted en marnie de velia emergencia.    Acuda a renu consultas de control con carbajal médico según le indicaron:Es posible que necesite más exámenes para determinar la causa de carbajal dolor de pierna. Usted podría necesitar fisioterapia o consultar con un ortopedista especializado. Anote renu preguntas para que se acuerde de hacerlas hiwot renu visitas.    Lidiar con el dolor de pierna:  •Descansesu pierna para que se recupere. Es posible que necesite un inmovilizador, aparato ortopédico o férula para limitar el movimiento de la pierna. Posiblemente deba evitar poner peso sobre carbajal pierna hiwot al menos 48 horas. Regrese a renu actividades cotidianas según las indicaciones.    •El hieloal área afectada por 20 minutos cada 4 horas por un cornel de 24 horas o según las indicaciones. Use velia compresa de hielo o ponga hielo triturado en velia bolsa de plástico. Cúbrala con velia toalla para proteger carbajal piel. El hielo ayuda a evitar daño al tejido y a disminuir la inflamación y el dolor.    •Elevesu pierna lesionada por encima del nivel de carbajal corazón con la frecuencia que pueda. [place] va a disminuir inflamación y el dolor. Apoye la pierna sobre almohadas o mantas para mantener el área elevada de velia forma cómoda, si es posible.    •Use dispositivos de apoyo charisse se le indique.Es posible que usted necesite usar un bastón o muletas. Los dispositivos de asistencia pueden ayudar a disminuir el dolor y la presión que se ejerce en carbajal pierna al caminar. Pregunte a carbajal médico por más información sobre los dispositivos de asistencia y cómo se usan de forma correcta.    •Mantenga un peso saludable.El peso corporal adicional puede provocar presión y dolor en las articulaciones de carbajal cadera, rodilla y tobillo. Consulte con carbajal médico cuánto debería pesar. Pida que le ayude a crear un plan para bajar de peso si usted tiene sobrepeso.

## 2023-01-22 NOTE — ED PROVIDER NOTE - PATIENT PORTAL LINK FT
You can access the FollowMyHealth Patient Portal offered by [facility] by registering at the following website: [email or web address]. By joining [employer]’s FollowMyHealth portal, you will also be able to view your health information using other applications (apps) compatible with our system.

## 2023-01-22 NOTE — ED PROVIDER NOTE - NSICDXPASTMEDICALHX_GEN_ALL_CORE_FT
PAST MEDICAL HISTORY:  Asthma     Benign Essential Hypertension     DM (diabetes mellitus)     GERD (Gastroesophageal Reflux Disease)     High cholesterol     HTN (hypertension)     MVP (mitral valve prolapse)     Right-sided carotid artery disease

## 2023-02-03 ENCOUNTER — EMERGENCY (EMERGENCY)
Facility: HOSPITAL | Age: 58
LOS: 1 days | Discharge: HOME | End: 2023-02-03
Payer: COMMERCIAL

## 2023-02-03 VITALS
OXYGEN SATURATION: 100 % | HEART RATE: 80 BPM | TEMPERATURE: 98 F | DIASTOLIC BLOOD PRESSURE: 63 MMHG | SYSTOLIC BLOOD PRESSURE: 110 MMHG | RESPIRATION RATE: 15 BRPM

## 2023-02-03 VITALS — WEIGHT: 266.1 LBS | HEIGHT: 64 IN

## 2023-02-03 LAB
ALBUMIN SERPL ELPH-MCNC: 3.7 G/DL — SIGNIFICANT CHANGE UP (ref 3.3–5)
ALP SERPL-CCNC: 79 U/L — SIGNIFICANT CHANGE UP (ref 40–120)
ALT FLD-CCNC: 30 U/L — SIGNIFICANT CHANGE UP (ref 10–45)
ANION GAP SERPL CALC-SCNC: 9 MMOL/L — SIGNIFICANT CHANGE UP (ref 5–17)
ANISOCYTOSIS BLD QL: SLIGHT — SIGNIFICANT CHANGE UP
AST SERPL-CCNC: 18 U/L — SIGNIFICANT CHANGE UP (ref 10–40)
BASOPHILS # BLD AUTO: 0 K/UL — SIGNIFICANT CHANGE UP (ref 0–0.2)
BASOPHILS NFR BLD AUTO: 0 % — SIGNIFICANT CHANGE UP (ref 0–2)
BILIRUB SERPL-MCNC: 0.3 MG/DL — SIGNIFICANT CHANGE UP (ref 0.2–1.2)
BUN SERPL-MCNC: 16 MG/DL — SIGNIFICANT CHANGE UP (ref 7–23)
CALCIUM SERPL-MCNC: 9.9 MG/DL — SIGNIFICANT CHANGE UP (ref 8.4–10.5)
CHLORIDE SERPL-SCNC: 104 MMOL/L — SIGNIFICANT CHANGE UP (ref 96–108)
CO2 SERPL-SCNC: 28 MMOL/L — SIGNIFICANT CHANGE UP (ref 22–31)
CREAT SERPL-MCNC: 0.76 MG/DL — SIGNIFICANT CHANGE UP (ref 0.5–1.3)
DACRYOCYTES BLD QL SMEAR: SLIGHT — SIGNIFICANT CHANGE UP
EGFR: 92 ML/MIN/1.73M2 — SIGNIFICANT CHANGE UP
ELLIPTOCYTES BLD QL SMEAR: SLIGHT — SIGNIFICANT CHANGE UP
EOSINOPHIL # BLD AUTO: 0 K/UL — SIGNIFICANT CHANGE UP (ref 0–0.5)
EOSINOPHIL NFR BLD AUTO: 0 % — SIGNIFICANT CHANGE UP (ref 0–6)
GLUCOSE SERPL-MCNC: 118 MG/DL — HIGH (ref 70–99)
HCG SERPL-ACNC: 2.4 MIU/ML — SIGNIFICANT CHANGE UP
HCT VFR BLD CALC: 33.4 % — LOW (ref 34.5–45)
HGB BLD-MCNC: 9.7 G/DL — LOW (ref 11.5–15.5)
LIDOCAIN IGE QN: 24 U/L — SIGNIFICANT CHANGE UP (ref 7–60)
LYMPHOCYTES # BLD AUTO: 1.99 K/UL — SIGNIFICANT CHANGE UP (ref 1–3.3)
LYMPHOCYTES # BLD AUTO: 20 % — SIGNIFICANT CHANGE UP (ref 13–44)
MANUAL SMEAR VERIFICATION: SIGNIFICANT CHANGE UP
MCHC RBC-ENTMCNC: 22 PG — LOW (ref 27–34)
MCHC RBC-ENTMCNC: 29 GM/DL — LOW (ref 32–36)
MCV RBC AUTO: 75.9 FL — LOW (ref 80–100)
MICROCYTES BLD QL: SLIGHT — SIGNIFICANT CHANGE UP
MONOCYTES # BLD AUTO: 0.61 K/UL — SIGNIFICANT CHANGE UP (ref 0–0.9)
MONOCYTES NFR BLD AUTO: 6.1 % — SIGNIFICANT CHANGE UP (ref 2–14)
MYELOCYTES NFR BLD: 0.9 % — HIGH (ref 0–0)
NEUTROPHILS # BLD AUTO: 6.84 K/UL — SIGNIFICANT CHANGE UP (ref 1.8–7.4)
NEUTROPHILS NFR BLD AUTO: 68.7 % — SIGNIFICANT CHANGE UP (ref 43–77)
NT-PROBNP SERPL-SCNC: 68 PG/ML — SIGNIFICANT CHANGE UP (ref 0–300)
OVALOCYTES BLD QL SMEAR: SLIGHT — SIGNIFICANT CHANGE UP
PLAT MORPH BLD: NORMAL — SIGNIFICANT CHANGE UP
PLATELET # BLD AUTO: 271 K/UL — SIGNIFICANT CHANGE UP (ref 150–400)
POIKILOCYTOSIS BLD QL AUTO: SLIGHT — SIGNIFICANT CHANGE UP
POLYCHROMASIA BLD QL SMEAR: SLIGHT — SIGNIFICANT CHANGE UP
POTASSIUM SERPL-MCNC: 3.9 MMOL/L — SIGNIFICANT CHANGE UP (ref 3.5–5.3)
POTASSIUM SERPL-SCNC: 3.9 MMOL/L — SIGNIFICANT CHANGE UP (ref 3.5–5.3)
PROT SERPL-MCNC: 7 G/DL — SIGNIFICANT CHANGE UP (ref 6–8.3)
RBC # BLD: 4.4 M/UL — SIGNIFICANT CHANGE UP (ref 3.8–5.2)
RBC # FLD: 21.2 % — HIGH (ref 10.3–14.5)
RBC BLD AUTO: ABNORMAL
SODIUM SERPL-SCNC: 141 MMOL/L — SIGNIFICANT CHANGE UP (ref 135–145)
TROPONIN T, HIGH SENSITIVITY RESULT: 8 NG/L — SIGNIFICANT CHANGE UP (ref 0–51)
VARIANT LYMPHS # BLD: 4.3 % — SIGNIFICANT CHANGE UP (ref 0–6)
WBC # BLD: 9.96 K/UL — SIGNIFICANT CHANGE UP (ref 3.8–10.5)
WBC # FLD AUTO: 9.96 K/UL — SIGNIFICANT CHANGE UP (ref 3.8–10.5)

## 2023-02-03 PROCEDURE — 84702 CHORIONIC GONADOTROPIN TEST: CPT

## 2023-02-03 PROCEDURE — 99285 EMERGENCY DEPT VISIT HI MDM: CPT

## 2023-02-03 PROCEDURE — 93005 ELECTROCARDIOGRAM TRACING: CPT

## 2023-02-03 PROCEDURE — 36415 COLL VENOUS BLD VENIPUNCTURE: CPT

## 2023-02-03 PROCEDURE — 85025 COMPLETE CBC W/AUTO DIFF WBC: CPT

## 2023-02-03 PROCEDURE — U0005: CPT

## 2023-02-03 PROCEDURE — 99285 EMERGENCY DEPT VISIT HI MDM: CPT | Mod: 25

## 2023-02-03 PROCEDURE — 71046 X-RAY EXAM CHEST 2 VIEWS: CPT | Mod: 26

## 2023-02-03 PROCEDURE — 83690 ASSAY OF LIPASE: CPT

## 2023-02-03 PROCEDURE — 71046 X-RAY EXAM CHEST 2 VIEWS: CPT

## 2023-02-03 PROCEDURE — U0003: CPT

## 2023-02-03 PROCEDURE — 80053 COMPREHEN METABOLIC PANEL: CPT

## 2023-02-03 PROCEDURE — 83880 ASSAY OF NATRIURETIC PEPTIDE: CPT

## 2023-02-03 PROCEDURE — 84484 ASSAY OF TROPONIN QUANT: CPT

## 2023-02-03 PROCEDURE — 82962 GLUCOSE BLOOD TEST: CPT

## 2023-02-03 RX ORDER — ACETAMINOPHEN 500 MG
650 TABLET ORAL ONCE
Refills: 0 | Status: COMPLETED | OUTPATIENT
Start: 2023-02-03 | End: 2023-02-03

## 2023-02-03 RX ORDER — ASPIRIN/CALCIUM CARB/MAGNESIUM 324 MG
162 TABLET ORAL ONCE
Refills: 0 | Status: COMPLETED | OUTPATIENT
Start: 2023-02-03 | End: 2023-02-03

## 2023-02-03 RX ADMIN — Medication 650 MILLIGRAM(S): at 20:03

## 2023-02-03 RX ADMIN — Medication 162 MILLIGRAM(S): at 20:04

## 2023-02-03 NOTE — ED ADULT NURSE NOTE - BEFAST SPEECH PHRASE
Review of patient's charts demonstrates that discussion was had regarding continued Eliquis use with meloxicam.  Confirm with patient whether not he continues to use meloxicam in conjunction with Eliquis.  It was our recommendation that meloxicam be discontinued but we should confirm if patient complied.    Worsening shortness of breath and heaviness in the legs concerning for congestive symptoms.  Echocardiogram reviewed, previous messages reviewed.      Contact patient see if patient can get the following labs completed today or tomorrow NT proBNP, basic metabolic panel (order both a stat), as well as a chest x-ray (also stat)  Ask patient if he has had increase in his weight recently.    Patient should be scheduled to be seen in a nurse visit next week.    Degree of congestion needs to be assessed but expressed to patient that If shortness of breath worsens or develops chest pain chest discomfort neck recommendation to be seen at ED.     Yes

## 2023-02-03 NOTE — ED ADULT NURSE NOTE - CHIEF COMPLAINT QUOTE
chest pain, dizziness, headache, feeling numb at right side of face, feeling chest very hot  happened  10 minutes ago as pr  Marley #[id number]

## 2023-02-03 NOTE — ED ADULT NURSE NOTE - OBJECTIVE STATEMENT
57 y.o F PMH HTN, DM, right sided CAD, high cholesterol, asthma, presenting to the ED for left sided chest pain that started around 1745 today. Pt describes the pain as burning and states it is radiating down the left side of her body to her genitals. Pt also endorsing intermittent dyspnea on exertion. Denies SOB at rest, n/v/d, dizziness, weakness, fevers, chills, headache, visual changes, back pain, urinary changes. Pt placed on cardiac monitor. Bed in lowest position, wheels locked, appropriate side rails up. Safety maintained, comfort provided

## 2023-02-03 NOTE — ED PROVIDER NOTE - CLINICAL SUMMARY MEDICAL DECISION MAKING FREE TEXT BOX
Impression:  Adult F with multiple cardiac RFs and description of CP that warrants concern for ACS.  H&P at this time inconsistent with an alternative etiology such as pericarditis, myocarditis, pulmonary embolism, pneumothorax, pneumonia, zoster, or esophageal perforation.  History not abrupt in onset, tearing or ripping; pulses symmetric; no evidence of aortic dissection.  Neither CTA chest, nor ddimer indicated at this time. Impression:  Adult F with multiple cardiac RFs and description of CP that warrants concern for ACS.  H&P at this time inconsistent with an alternative etiology such as pericarditis, myocarditis, pulmonary embolism, pneumothorax, pneumonia, zoster, or esophageal perforation.  History not abrupt in onset, tearing or ripping; pulses symmetric; no evidence of aortic dissection.  Neither CTA chest, nor ddimer indicated at this time. Physical exam and relief with asa suggests patient may have costochrondritis.

## 2023-02-03 NOTE — ED PROVIDER NOTE - NS ED ROS FT
REVIEW OF SYSTEMS:  CONSTITUTIONAL: (-) weakness, (-) fevers (-) chills  EYES/ENT: (- ) visual changes;  (-) vertigo (-) throat pain   NECK: (- )pain (-) stiffness  RESPIRATORY: (-)cough,  (-) wheezing, (-) hemoptysis; (-) shortness of breath  CARDIOVASCULAR: (+) chest pain (-) palpitations  GASTROINTESTINAL: (-) abdominal (-) epigastric pain. (+) nausea (-) vomiting, or hematemesis; (-) diarrhea or constipation.   GENITOURINARY: (-) dysuria, frequency or hematuria  NEUROLOGICAL: (-) numbness or weakness  SKIN: (-) itching, rashes

## 2023-02-03 NOTE — ED PROVIDER NOTE - NSFOLLOWUPINSTRUCTIONS_ED_ALL_ED_FT
¿Cuáles son las causas?  No siempre se conoce la causa exacta de esta afección. Se debe a la sobrecarga en el cartílago donde las costillas se unen con el esternón. La causa de esta sobrecarga puede ser la siguiente:  •Lesión torácica.  •Ejercicios o actividades relacionadas con levantar pesos.  •Tos intensa.    ¿Qué incrementa el riesgo?  Es más probable que tenga esta afección si:  •Es froy.  •Tiene entre 30 y 40 años.  •Comenzó un nuevo ejercicio o coleen nueva actividad recientemente.  •Tiene niveles bajos de vitamina D.  •Tiene coleen afección que lo hace toser con frecuencia.  ¿Cuáles son los signos o síntomas?    El síntoma principal de esta afección es el dolor en el pecho. El dolor:  •Por lo general, comienza de manera gradual y puede ser penetrante o sordo.  •Empeora al respirar, toser o hacer ejercicio.  •Mejora con el reposo.  •Puede empeorar al presionar la conner afectada de las costillas y el esternón.    ¿Cómo se diagnostica?  Esta afección se diagnostica en función de oralia síntomas, antecedentes médicos y de un examen físico. El médico determinará si tiene dolor al presionarle el esternón. Es posible que también le giuliana estudios para descartar otras causas del dolor en el pecho. Estos estudios pueden incluir:  •Coleen radiografía de tórax para verificar si tiene problemas pulmonares.  •Un ECG (electrocardiograma) para verificar si tiene algún problema cardíaco que podría causarle el dolor.  •Un estudio de diagnóstico por imágenes para descartar coleen fractura de tórax o de laurel.    ¿Cómo se trata?  Generalmente, esta afección desaparece con el paso tiempo, sin tratamiento. El médico puede recetarle un antiinflamatorio no esteroideo (ERICK), charisse ibuprofeno, para aliviar el dolor y la inflamación. El tratamiento también puede incluir lo siguiente:  •Hacer reposo y evitar las actividades que empeoren el dolor.  •Aplicar calor o frío en la conner para aliviar el dolor y la inflamación.  •Hacer ejercicios para elongar los músculos del pecho.    Si estos tratamientos no ayudan, es posible que el médico le inyecte un anestésico en el lugar donde se unen el esternón con las costillas para aliviar el dolor      Siga estas instrucciones en carbajal casa:  Control del dolor, la rigidez y la hinchazón   •Si se lo indican, aplique hielo sobre la conner dolorida. Para hacer esto:  •Ponga el hielo en coleen bolsa plástica.  •Coloque coleen toalla entre la piel y la bolsa.  •Aplique el hielo hiwot 20 minutos, 2 a 3 veces por día.  •Si se lo indican, aplique calor en la conner afectada con la frecuencia que le haya indicado el médico. Use la mery de calor que el médico le recomiende, charisse coleen compresa de calor húmedo o coleen almohadilla térmica.  •Coloque coleen toalla entre la piel y la mery de calor.  •Aplique calor hiwot 20 a 30 minutos.  •Retire la mery de calor si la piel se pone de color johnson brillante. [place] es especialmente importante si no puede sentir dolor, calor o frío. Puede correr un riesgo mayor de sufrir quemaduras.    Actividad   •Sagrario reposo charisse se lo haya indicado el médico. Evite las actividades que empeoren el dolor. [place] incluye cualquier actividad que involucre los músculos del tórax, del abdomen y los laterales.  • No levante ningún objeto que pese más de 10 libras (4.5 kg) o que supere el límite de peso que le hayan indicado, hasta que el médico le diga que puede hacerlo.  •Retome oralia actividades normales según lo indicado por el médico. Pregúntele al médico qué actividades son seguras para usted.    Instrucciones generales   •[place] los medicamentos de venta myla y los recetados solamente charisse se lo haya indicado el médico.  •Concurra a todas las visitas de seguimiento charisse se lo haya indicado el médico. [place] es importante.    Comuníquese con un médico si:  •Siente escalofríos o tiene fiebre.  •El dolor persiste o empeora.  •Tiene tos que no desaparece.    Solicite ayuda de inmediato si:  •Le falta el aire.  •Siente dolor de pecho intenso que no se shonda con los medicamentos, calor o hielo.    Estos síntomas pueden representar un problema grave que constituye coleen emergencia. No espere a adán si los síntomas desaparecen. Solicite atención médica de inmediato. Comuníquese con el servicio de emergencias de carbajal localidad (911 en los [place]). No conduzca por oralia propios medios hasta el hospital.

## 2023-02-03 NOTE — ED PROVIDER NOTE - PATIENT PORTAL LINK FT
You can access the FollowMyHealth Patient Portal offered by [facility] by registering at the following website: [email or web address]. By joining [employer]’s FollowMyHealth portal, you will also be able to view your health information using other applications (apps) compatible with our system.

## 2023-02-03 NOTE — ED ADULT TRIAGE NOTE - CHIEF COMPLAINT QUOTE
chest pain, dizziness, headache, feeling numb at right side of face, feeling chest very hot as per . happened  10 minutes ago chest pain, dizziness, headache, feeling numb at right side of face, feeling chest very hot  happened  10 minutes ago as pr  Marley #[id number]

## 2023-02-03 NOTE — ED PROVIDER NOTE - OBJECTIVE STATEMENT
57F w/PMHx of HTN, HLD and prediabetes presenting with chest pain. Pain started at 6pm while patient was in the car, she stated the pain was located on the left breast and described it as an explosion. No radiation, and initially described the pain a 10/10. Pain was relieved after patient took asa 81mg as instructed per her cardiologist. Unknown if pain is worse with exertion as pain has went away since taking asa.  In addition the patient endorsed having nausea during that time. No recent illnesses, f/c, sob, abd pain at this time

## 2023-02-03 NOTE — ED PROVIDER NOTE - ATTENDING CONTRIBUTION TO CARE
MD Murguia:  patient seen and evaluated with the resident.  I was present for key portions of the History & Physical, and I agree with the Impression & Plan.  MD Murguia:  58 yo F, c/o chest pain.  Duration:  onset 6pm, while driving home  Quality:  sharp  Associated Sx:  dry mouth, HAILE, No back pain.  No weakness/numbness, no abdominal pain, & no fever/chills.    VS: wnl.  Physical Exam: adult F, NAD, NCAT, PERRL, EOMI, neck supple, CTA B, RRR, Abd: s/nd/nt, Ext: no edema.  Neuro:  AAOx3, moving all 4 extremities equally, normal gait.     ECG visualized by me, and shows: a rate of  82, no STEMI  Impression:  Adult F with multiple cardiac RFs and description of CP that warrants concern for ACS.  H&P at this time inconsistent with an alternative etiology such as pericarditis, myocarditis, pulmonary embolism, pneumothorax, pneumonia, zoster, or esophageal perforation.  History not abrupt in onset, tearing or ripping; pulses symmetric; no evidence of aortic dissection.  Neither CTA chest, nor ddimer indicated at this time.

## 2023-02-03 NOTE — ED PROVIDER NOTE - PHYSICAL EXAMINATION
GENERAL: NAD, well-developed  HEENT:  Atraumatic, Normocephalic, EOMI, PERRLA, conjunctiva and sclera clear, oral mucosa dry, clear w/o any exudate   NECK: Supple, No JVD  CHEST/LUNG: Clear to auscultation bilaterally; No wheeze  HEART: RRR; No murmurs, rubs, or gallops; +L chest is tender to palpation  ABDOMEN: Soft, Nontender, Nondistended; Bowel sounds present  EXTREMITIES:  2+ Peripheral Pulses, No clubbing, cyanosis, or edema  PSYCH: AAOx3, normal affect   NEUROLOGY: non-focal, moving all extremities  SKIN: No rashes or lesions

## 2023-02-04 PROBLEM — E78.00 PURE HYPERCHOLESTEROLEMIA, UNSPECIFIED: Chronic | Status: ACTIVE | Noted: 2023-01-22

## 2023-02-04 LAB — SARS-COV-2 RNA SPEC QL NAA+PROBE: SIGNIFICANT CHANGE UP

## 2023-02-11 ENCOUNTER — NON-APPOINTMENT (OUTPATIENT)
Age: 58
End: 2023-02-11

## 2023-02-14 ENCOUNTER — NON-APPOINTMENT (OUTPATIENT)
Age: 58
End: 2023-02-14

## 2023-03-02 NOTE — H&P ADULT. - OTHER CARE PROVIDERS
Neurology Consult Follow Up      Cornelius Beaver is a 79 y o  female  ICU 02/ICU 02    [id number]        Assessment/Recommendations:    1   Acute to subacute left occipital lobe infarct  2   Possible cardiac arrest   3   multiple sclerosis with spasticity  4   Possible seizure   5  Chronic UTI  6   Hypertension  7   Chronic left basal ganglion infarct     -Critical care monitoring  -Neurological assessments  -Aspiration precaution  -Continue aspirin 325 mg daily  -Patient continues with elevated LFTs, continue holding on statin dosing for now  Would recommend Lipitor 40 mg to start when her LFTs have returned to normal or plateaued  -EEG withmoderate diffuse cerebral dysfunction on non specific etiology, no epileptiform discharges or sharps appreciated    -Attempted use of Keppra, patient became increasingly lethargic  No EEG findings to support seizure activity therefore Keppra was discontinued    -Baclofen regimen with 10 mg at 4 PM, 15 mg every 12 hours   -Patient should maintain systolic blood pressure greater than or equal to 130, may need to use vasopressors or IV fluids for this  Critical care team aware  -CTA of the head and neck completed-negative for LVO, dissection or occlusion  -MRI of the brain completed showing acute to subacute left occipital lobe infarct with no discrete enhancing lesions to suggest acute demyelination   Positive chronic left basal ganglia lacunar infarct   -Echocardiogram with shunt study completed,EF of 60% with mildly dilated lt atrium, normal rt atrium and no PFO or shunt noted    -Labs with A1c and lipid profile-cholesterol 197,  and A1c 4 6  -PT/OT/ST when patient medically stable  -Spoke with patient's neurologist Dr Rudolph Swanson at [phone], updated with patient's status  Patient had been receiving 12 5 mg of methotrexate intrathecally for MS every 2 months, has tolerated this well and not suspected to be etiology of her current symptomology's     Baseline reported lower extremity function and weakness, patient essentially chair bound however is able to assist with transfers and possibly 1-2 steps  -IV antibiotics and fluids per the critical care team   -Goals of care discussion had with patient's family through the critical care team   Family prefers patient to be extubated, will make DNI postextubation  Family does not feel patient would want to continue on it and intubated state  Family aware of possible respiratory failure due to current state of weakness      Patient is a 61-year-old female with chronic history of UTI and MS treats with baclofen, gabapentin and intrathecal triamcinolone through her neurologist  Rozanna Frankel had injection on 2/24/2023, generally tolerates this well  Di French was also diagnosed recently with UTI for which she started on Bactrim   Culture from 2/23/2023 showed Enterococcus   Cultures remain pending at this time resent upon admission  Patient was normal at home, met family at [facility]  In route reported loss of vision, seeing shadows only  When she got to the [facility] she was having difficulties with conversation and nonsense talk  Family got her back to the car was bringing her to the hospital when she suddenly stopped speaking, she then slumped over to her left and became unresponsive  Patient was pulled from the car and CPR was initiated by  who was in the area  EMS was called  Question if patient had pulse during this timeframe however upon arrival of EMS patient did have pulse and was intubated in the field  She was brought to the ER for further evaluation  Patient does have history of MS with very recent intrathecal injection of methotrexate, neurology MD on-call recommendations for LP to be done for possible infection  LP was completed, CSF negative for infection, meningitis panel negative as well  Patient remained intubated, was admitted to the ICU for further treatment and work-up    MRI of the brain was recommended by on-call neurologist with without contrast   Patient also treating via critical care team for frequent UTIs and sepsis  Patient had initially been able to have some movement with all extremities per critical care documentation  However following day, neurology evaluation patient was not moving the right upper extremity nor the lower extremities bilaterally  Patient was able to squeeze hand on the left however weak  She was able to nod yes no and follow simple commands  There was question with regards to possible seizure activity as patient had frothing at the mouth and shaking while in the car initially  EEG was completed showing diffuse slowing, no epileptiform discharges were seen  Patient was put on IV Keppra prophylactically however had significant lethargy and was minimally responsive were arousable the following day  Due to no evidence of actual seizure activity, no recurrent seizure activity while in the ICU, Keppra was discontinued  MRI of the brain was completed noted acute to subacute left occipital infarct versus demyelinating plaque  No enhancing lesions were seen on this MRI with contrast   Unclear if this is area of infarct versus MS plaque burden however cannot completely rule out the infarct therefore will treat as acute to subacute stroke  Patient was placed on the stroke pathway echocardiogram was completed  CTA of the head and neck had already been completed  Patient started on full dose aspirin however had to hold off on starting Lipitor due to elevated LFTs  Patient remains intubated, has had increased weakness over the past few days  Patient has been on a antibiotics for UTI, no other evidence of acute infection seen thus far  Recently she has had some low-grade fevers and episodes of hypotension, again no white count elevation or evidence of infection elsewhere  Critical care team managing patient's IV fluid status and IV antibiotics    Recommend patient maintain a blood pressure greater than 630 systolic  Patient does have outpatient neurologist who recently did intrathecal injection  Had discussion with him regarding her status  Per Dr Per Castro through [facility], she receives 12 5 mg of methotrexate for her MS every 2 months  According to  she has been having this ongoing for approximately 2-1/2 years  Patient has not had any adverse response to this medication regimen  Patient baseline mobility is poor  She has 3-/5 iliopsoas, hamstrings 2/5 and normal quadricep strength  She has been able to stand and assist with transfers, she can take couple of steps however is very little mobility  Per Dr Per Casrto no knowledge of prior CVA  Critical care team request films from [facility] for further inspection from early February MRI  MRI of the brain indicated acute to subacute left occipital lobe infarct with chronic left corona radiata infarct  Reviewed prior MRIs from 2/13/2023, there are areas of chronic ischemic change however this is not a contrast study  Unclear if this is related to ischemia or demyelination  Cannot completely rule out the possibility of a new subacute to acute infarct therefore patient will remain on full dose aspirin and should start on Lipitor 40 mg when her LFTs have normalized or plateaued  Spoke with patient and family at bedside today as well as critical care team they are opting to attempt extubation today  Patient's weaning parameters are on the low end however patient's  feels she would not want to remain on the ventilator  She has been weak and struggled for some time and at this point in time they would prefer to extubate and provide comfort if she is going to have any issues with breathing postextubation    According to critical care team, patient will be made DNR/DNI per family request          Rima Mora will need follow up in in 6 weeks with multiple sclerosis attending  She will not Cardiology: Dr. Seo require outpatient neurological testing  Chief Complaint:    Subjective:   Patient more awake and arousable today  Able to nod yes no  Attempting to follow commands however has no mobility of the upper or lower extremities  Patient was able to wiggle her left toes with the movement of the fifth toe only  Patient was able to have sensation to all extremities equally with the exception of the right lower extremity from above the knee down  Patient was able to follow some commands appears to have possible disconjugate movement when EOM to the right possible YULI  Unable to fully assess due to patient positioning, mobility and lethargy  Past Medical History:   Diagnosis Date   • Back pain 2016   • Chronic UTI    • Colon polyp    • Hypertension    • Incontinence    • MS (multiple sclerosis) ([facility])     Age 38   • Neurogenic bladder    • Spasticity      Social History     Socioeconomic History   • Marital status: /Civil Union     Spouse name: Not on file   • Number of children: Not on file   • Years of education: Not on file   • Highest education level: Not on file   Occupational History   • Not on file   Tobacco Use   • Smoking status: Never   • Smokeless tobacco: Never   Vaping Use   • Vaping Use: Never used   Substance and Sexual Activity   • Alcohol use: Not Currently   • Drug use: Yes     Types: Marijuana     Comment: medical   • Sexual activity: Not on file   Other Topics Concern   • Not on file   Social History Narrative   • Not on file     Social Determinants of Health     Financial Resource Strain: Not on file   Food Insecurity: No Food Insecurity   • Worried About Running Out of Food in the Last Year: Never true   • Ran Out of Food in the Last Year: Never true   Transportation Needs: No Transportation Needs   • Lack of Transportation (Medical): No   • Lack of Transportation (Non-Medical):  No   Physical Activity: Not on file   Stress: Not on file   Social Connections: Not on file   Intimate Partner Violence: Not on file   Housing Stability: Low Risk    • Unable to Pay for Housing in the Last Year: No   • Number of Places Lived in the Last Year: 1   • Unstable Housing in the Last Year: No     Family History   Problem Relation Age of Onset   • Cancer Mother    • Heart disease Father    • Cancer Sister        ROS:  Please see HPI for positive symptoms  Patient is unable to participate in ROS, however denies at this point in time having headache, dizziness or pain  Objective:  /70   Pulse 80   Temp (!) 100 8 °F (38 2 °C)   Resp (!) 25   Ht 4' 7" (1 397 m)   Wt 80 2 kg (176 lb 12 9 oz)   SpO2 99%   BMI 41 09 kg/m²     General: alert, however groggy at times during exam  Mental status: Unable to be fully assessed   attention: normal  Knowledge: Unable to fully assess   language and Speech: Unable to be assessed due to intubation  Cranial nerves:   CN II: Visual fields is unable to be assessed  Fundoscopic exam is unable to be assessed  Pupils are 2mm and briskly reactive to light  CN III, IV, VI: At primary gaze, possible disc conjugate gaze with leg in the right eye, limited assessment available with intubation and patient mobility  CN V: Facial sensation is intact in all 3 divisions bilaterally  Corneal responses are intact  CN VII: Face is symmetrical, with normal eye closure  CN VIII: Hearing is normal to rubbing fingers  CN IX, X: Palate and phonation are unable to be assessed   CN XI: Head turning and shoulder shrug are unable to be assessed  CN XII: Tongue is midline  Motor:  Muscle bulk and tone are normal upper extremity some mild atrophy noted to the bilateral lower extremities  Motor exam poor, patient not moving any of her extremities to command  No spontaneous movement  Some mild wiggling of left fifth toe on command    No other commands followed with regards to motor exam   Unable to perform pronator drift exam     Muscle exam  Arm Right Left Leg Right Left   Deltoid 0 0 Iliopsoas 0 0   Biceps 0 0 Quads 0 0   Triceps 0 0 Hamstrings 0 0   Wrist Extension 0 0 Ankle Dorsi Flexion 0 0   Wrist Flexion 0 0 Ankle Plantar Flexion 0 0       Sensory: normal to light touch, temperature, vibration  Proprioception intact  + Decreased sensation of the right lower extremity from above knee to foot  Gait: Unable to assess  Coordination: Unable to assess     Reflexes    RJ BJ TJ KJ AJ Plantars Stock's   Right 2+ 2+  tr tr Min down Not present   Left 2+ 2+  tr tr Min down Not present      Heart: Regular rate and rhythm  Lung: Per ventilatory support  Abd: soft, non-tender, non-distended   Skin: dry and intact    Labs:      Lab Results   Component Value Date    WBC 6 81 03/02/2023    HGB 8 6 (L) 03/02/2023    HCT 26 8 (L) 03/02/2023     (H) 03/02/2023     03/02/2023     Lab Results   Component Value Date    HGBA1C 4 6 02/28/2023     Lab Results   Component Value Date     (H) 03/01/2023    AST 78 (H) 03/01/2023    ALKPHOS 158 (H) 03/01/2023     Lab Results   Component Value Date    CALCIUM 8 4 03/02/2023    K 5 1 03/02/2023    CO2 30 03/02/2023     03/02/2023    BUN 18 03/02/2023    CREATININE 0 81 03/02/2023     Cholesterol 197      Review of reports and notes reveal:   Independent review of films/reports:  CTA head and neck with and without contrast    Result Date: 2/26/2023  1  No evidence of acute infarct, intracranial hemorrhage or mass effect  2   No hemodynamically significant stenosis, dissection or occlusion of the carotid or vertebral arteries or major vessels of the [place]  Workstation performed: [id number]     MRI brain w wo contrast    Result Date: 2/27/2023  1  Punctate subcentimeter focus of restricted diffusion within the left occipital lobe which can be seen with an acute to subacute infarct versus a demyelinating lesion   2  Periventricular, subcortical, and pericallosal white matter lesions consistent with the patient's history of demyelinating disease  There is no pathologic intra-axial enhancement to suggest acute demyelination  3  Acute on chronic sinus disease  Mild right mastoid fluid  The study was marked in [facility] for immediate notification  Workstation performed: [id number]     CT chest abdomen pelvis w contrast    Result Date: 2/26/2023  No acute thoracic or intra-abdominal pelvic abnormality identified  Mild bibasilar opacities left greater than right favoring atelectasis  Ectasia of the ascending aorta, 40 mm  Stable  Suggest follow-up in one year  The study was marked in EPIC for significant notification  Workstation performed: [id number]         Thank you for this consult      Total time of encounter:  30 min  More than 50% of the time was used in counseling and/or coordination of care  Extent of couseling and/or coordination of care

## 2023-03-31 NOTE — ED PROVIDER NOTE - QUALITY
burning Quality 111:Pneumonia Vaccination Status For Older Adults: Pneumococcal vaccine (PPSV23) administered on or after patientâs 60th birthday and before the end of the measurement period Quality 226: Preventive Care And Screening: Tobacco Use: Screening And Cessation Intervention: Patient screened for tobacco use and is an ex/non-smoker Quality 130: Documentation Of Current Medications In The Medical Record: Current Medications Documented Quality 431: Preventive Care And Screening: Unhealthy Alcohol Use - Screening: Patient not identified as an unhealthy alcohol user when screened for unhealthy alcohol use using a systematic screening method Quality 47: Advance Care Plan: Advance Care Planning discussed and documented in the medical record; patient did not wish or was not able to name a surrogate decision maker or provide an advance care plan. Quality 110: Preventive Care And Screening: Influenza Immunization: Influenza Immunization Administered during Influenza season Detail Level: Detailed

## 2023-04-26 NOTE — ED PROVIDER NOTE - CONSTITUTIONAL NEGATIVE STATEMENT, MLM
no fever and no chills. Prednisone Counseling:  I discussed with the patient the risks of prolonged use of prednisone including but not limited to weight gain, insomnia, osteoporosis, mood changes, diabetes, susceptibility to infection, glaucoma and high blood pressure.  In cases where prednisone use is prolonged, patients should be monitored with blood pressure checks, serum glucose levels and an eye exam.  Additionally, the patient may need to be placed on GI prophylaxis, PCP prophylaxis, and calcium and vitamin D supplementation and/or a bisphosphonate.  The patient verbalized understanding of the proper use and the possible adverse effects of prednisone.  All of the patient's questions and concerns were addressed.

## 2023-05-26 NOTE — ED ADULT TRIAGE NOTE - BRAND OF FIRST COVID-19 BOOSTER
Patient Instructions   PLEASE READ YOUR DISCHARGE INSTRUCTIONS ENTIRELY AS IT CONTAINS IMPORTANT INFORMATION.     Please drink plenty of fluids.     Please get plenty of rest.     Please return here or go to the Emergency Department for any concerns or worsening of condition.     Please take an over the counter antihistamine medication (allegra/Claritin/Zyrtec) of your choice as directed.     Try an over the counter decongestant like Mucinex D or Sudafed. You buy this behind the pharmacy counter     If you do have Hypertension or palpitations, it is safe to take Coricidin HBP for relief of sinus symptoms.     If not allergic, please take over the counter Tylenol (Acetaminophen) and/or Motrin (Ibuprofen) as directed for control of pain and/or fever.  Please follow up with your primary care doctor or specialist as needed.     Sore throat recommendations: Warm fluids, warm salt water gargles, throat lozenges, tea, honey, soup, rest, hydration.     Use over the counter flonase: one spray each nostril twice daily OR two sprays each nostril once daily.      If you  smoke, please stop smoking.     Please return or see your primary care doctor if you develop new or worsening symptoms.      Please arrange follow up with your primary medical clinic as soon as possible. You must understand that you've received an Urgent Care treatment only and that you may be released before all of your medical problems are known or treated. You, the patient, will arrange for follow up as instructed. If your symptoms worsen or fail to improve you should go to the Emergency Room.     Moderna

## 2023-11-27 NOTE — ED ADULT NURSE NOTE - EXTENSIONS OF SELF_ADULT
Use the [facility] as needed for cough up to 3 times a day. Use the rescue inhaler as needed for shortness of breath. Monitor your oxygen saturation with a pulse oximeter and if your oxygen saturation is below 90% and not coming up return to the emergency department immediately. Take Tylenol or Motrin as needed for fevers and body aches.
None
Aashish Rivas  OPHTHALMOLOGY  [address] [place] [address], [address]  [place] [address]  Phone: [phone]  Fax: [phone]  Scheduled Appointment: 10/20/2020 09:30 AM

## 2024-01-11 NOTE — ED PROVIDER NOTE - PATIENT PORTAL LINK FT
Home
You can access the FollowMyHealth Patient Portal offered by [facility] by registering at the following website: [email or web address]. By joining [facility]’s FollowMyHealth portal, you will also be able to view your health information using other applications (apps) compatible with our system.

## 2024-02-20 NOTE — ED PROVIDER NOTE - CARDIOVASCULAR NEGATIVE STATEMENT, MLM
[facility]: Phone [phone].    Thank you.  LISHA LOPEZ  [facility]     no chest pain and no edema.

## 2024-05-07 NOTE — ED ADULT NURSE NOTE - ATTEMPT TO OOB
Patient seen and examined at bedside.  All imaging and lab studies reviewed by myself and by Dr. Allen.  Patient is ready for planned operative re-exploration and possible small bowel anastomosis. no

## 2024-05-24 ENCOUNTER — NON-APPOINTMENT (OUTPATIENT)
Age: 59
End: 2024-05-24

## 2024-05-26 ENCOUNTER — INPATIENT (INPATIENT)
Facility: HOSPITAL | Age: 59
LOS: 2 days | Discharge: HOME | DRG: 195 | End: 2024-05-29
Attending: STUDENT IN AN ORGANIZED HEALTH CARE EDUCATION/TRAINING PROGRAM | Admitting: STUDENT IN AN ORGANIZED HEALTH CARE EDUCATION/TRAINING PROGRAM
Payer: COMMERCIAL

## 2024-05-26 VITALS
HEART RATE: 126 BPM | DIASTOLIC BLOOD PRESSURE: 72 MMHG | TEMPERATURE: 101 F | OXYGEN SATURATION: 95 % | SYSTOLIC BLOOD PRESSURE: 124 MMHG | WEIGHT: 272.05 LBS | RESPIRATION RATE: 24 BRPM | HEIGHT: 64 IN

## 2024-05-26 PROCEDURE — 99285 EMERGENCY DEPT VISIT HI MDM: CPT

## 2024-05-26 RX ORDER — IBUPROFEN 200 MG
800 TABLET ORAL ONCE
Refills: 0 | Status: COMPLETED | OUTPATIENT
Start: 2024-05-26 | End: 2024-05-26

## 2024-05-26 RX ORDER — SODIUM CHLORIDE 9 MG/ML
1000 INJECTION INTRAMUSCULAR; INTRAVENOUS; SUBCUTANEOUS ONCE
Refills: 0 | Status: COMPLETED | OUTPATIENT
Start: 2024-05-26 | End: 2024-05-26

## 2024-05-26 RX ADMIN — Medication 800 MILLIGRAM(S): at 23:52

## 2024-05-26 RX ADMIN — SODIUM CHLORIDE 1000 MILLILITER(S): 9 INJECTION INTRAMUSCULAR; INTRAVENOUS; SUBCUTANEOUS at 23:52

## 2024-05-26 NOTE — ED PROVIDER NOTE - CLINICAL SUMMARY MEDICAL DECISION MAKING FREE TEXT BOX
5d uri symptoms with fever and sob, pt is asthmatic, no wheeze at this time, sat good on RA, check sepsis labs, cultures, cxr, possible ct chest, viral swab, reassess

## 2024-05-26 NOTE — ED PROVIDER NOTE - CARE PLAN
Principal Discharge DX:	Community acquired bilateral lower lobe pneumonia  Secondary Diagnosis:	Parainfluenza   1

## 2024-05-26 NOTE — ED PROVIDER NOTE - COVID-19  TEST TYPE
PODIATRY NOTE    S/   Staceynikolas Luo is a 73 y.o. female, RTC for follow up status post left hallux partial nail avulsion.  Patient has been soaking and covering the toe as instructed.  Denies any F/C/N/V or pain.  No complaints.      O/  Lower extremity exam:  -Neurovascular status intact.    -Cap refill wnl.    -No edema noted to affected border  -left hallux nail border clean with minimal dry scab from soaking.    -No erythema or drainage.    -No tenderness to palpation of affected border     A/  1. Onychocryptosis    P/  -Nail border cleaned. No offending agent or spicule noted.    -Okay to resume activities.  No soaking needed.  -RTC prn    Report Electronically Signed By:     Sepideh Gomez DPM   Podiatric Medicine & Surgery  [facility]  8/13/2023                    
MOLECULAR PCR

## 2024-05-26 NOTE — ED ADULT NURSE NOTE - NSFALLUNIVINTERV_ED_ALL_ED
Bed/Stretcher in lowest position, wheels locked, appropriate side rails in place/Call bell, personal items and telephone in reach/Instruct patient to call for assistance before getting out of bed/chair/stretcher/Non-slip footwear applied when patient is off stretcher/[place] to call system/Physically safe environment - no spills, clutter or unnecessary equipment/Purposeful proactive rounding/Room/bathroom lighting operational, light cord in reach

## 2024-05-26 NOTE — ED PROVIDER NOTE - OBJECTIVE STATEMENT
58y F c/o 5 days congestion, sore throat, cough, asthma exacerbation, seen at [facility] yesterday, negative covid, negative strep, told she has a uti, last took tylenol about 1hr ago, not improving

## 2024-05-27 DIAGNOSIS — J18.9 PNEUMONIA, UNSPECIFIED ORGANISM: ICD-10-CM

## 2024-05-27 LAB
A1C WITH ESTIMATED AVERAGE GLUCOSE RESULT: 5.5 % — SIGNIFICANT CHANGE UP (ref 4–5.6)
ALBUMIN SERPL ELPH-MCNC: 2.9 G/DL — LOW (ref 3.3–5)
ALBUMIN SERPL ELPH-MCNC: 3.1 G/DL — LOW (ref 3.3–5)
ALP SERPL-CCNC: 86 U/L — SIGNIFICANT CHANGE UP (ref 30–120)
ALP SERPL-CCNC: 88 U/L — SIGNIFICANT CHANGE UP (ref 30–120)
ALT FLD-CCNC: 38 U/L — SIGNIFICANT CHANGE UP (ref 10–60)
ALT FLD-CCNC: 45 U/L — SIGNIFICANT CHANGE UP (ref 10–60)
ANION GAP SERPL CALC-SCNC: 7 MMOL/L — SIGNIFICANT CHANGE UP (ref 5–17)
ANION GAP SERPL CALC-SCNC: 9 MMOL/L — SIGNIFICANT CHANGE UP (ref 5–17)
APPEARANCE UR: CLEAR — SIGNIFICANT CHANGE UP
AST SERPL-CCNC: 24 U/L — SIGNIFICANT CHANGE UP (ref 10–40)
AST SERPL-CCNC: 49 U/L — HIGH (ref 10–40)
BASE EXCESS BLDV CALC-SCNC: 0.9 MMOL/L — SIGNIFICANT CHANGE UP (ref -2–3)
BASOPHILS # BLD AUTO: 0.02 K/UL — SIGNIFICANT CHANGE UP (ref 0–0.2)
BASOPHILS NFR BLD AUTO: 0.4 % — SIGNIFICANT CHANGE UP (ref 0–2)
BILIRUB SERPL-MCNC: 0.3 MG/DL — SIGNIFICANT CHANGE UP (ref 0.2–1.2)
BILIRUB SERPL-MCNC: 0.5 MG/DL — SIGNIFICANT CHANGE UP (ref 0.2–1.2)
BILIRUB UR-MCNC: NEGATIVE — SIGNIFICANT CHANGE UP
BUN SERPL-MCNC: 11 MG/DL — SIGNIFICANT CHANGE UP (ref 7–23)
BUN SERPL-MCNC: 15 MG/DL — SIGNIFICANT CHANGE UP (ref 7–23)
CALCIUM SERPL-MCNC: 9.1 MG/DL — SIGNIFICANT CHANGE UP (ref 8.4–10.5)
CALCIUM SERPL-MCNC: 9.1 MG/DL — SIGNIFICANT CHANGE UP (ref 8.4–10.5)
CHLORIDE SERPL-SCNC: 100 MMOL/L — SIGNIFICANT CHANGE UP (ref 96–108)
CHLORIDE SERPL-SCNC: 105 MMOL/L — SIGNIFICANT CHANGE UP (ref 96–108)
CO2 SERPL-SCNC: 27 MMOL/L — SIGNIFICANT CHANGE UP (ref 22–31)
CO2 SERPL-SCNC: 28 MMOL/L — SIGNIFICANT CHANGE UP (ref 22–31)
COLOR SPEC: YELLOW — SIGNIFICANT CHANGE UP
CREAT SERPL-MCNC: 0.87 MG/DL — SIGNIFICANT CHANGE UP (ref 0.5–1.3)
CREAT SERPL-MCNC: 0.93 MG/DL — SIGNIFICANT CHANGE UP (ref 0.5–1.3)
DIFF PNL FLD: NEGATIVE — SIGNIFICANT CHANGE UP
EGFR: 72 ML/MIN/1.73M2 — SIGNIFICANT CHANGE UP
EGFR: 78 ML/MIN/1.73M2 — SIGNIFICANT CHANGE UP
EOSINOPHIL # BLD AUTO: 0.12 K/UL — SIGNIFICANT CHANGE UP (ref 0–0.5)
EOSINOPHIL NFR BLD AUTO: 2.2 % — SIGNIFICANT CHANGE UP (ref 0–6)
ESTIMATED AVERAGE GLUCOSE: 111 MG/DL — SIGNIFICANT CHANGE UP (ref 68–114)
GLUCOSE BLDC GLUCOMTR-MCNC: 151 MG/DL — HIGH (ref 70–99)
GLUCOSE BLDC GLUCOMTR-MCNC: 153 MG/DL — HIGH (ref 70–99)
GLUCOSE BLDC GLUCOMTR-MCNC: 161 MG/DL — HIGH (ref 70–99)
GLUCOSE BLDC GLUCOMTR-MCNC: 172 MG/DL — HIGH (ref 70–99)
GLUCOSE SERPL-MCNC: 156 MG/DL — HIGH (ref 70–99)
GLUCOSE SERPL-MCNC: 195 MG/DL — HIGH (ref 70–99)
GLUCOSE UR QL: NEGATIVE MG/DL — SIGNIFICANT CHANGE UP
HCO3 BLDV-SCNC: 26 MMOL/L — SIGNIFICANT CHANGE UP (ref 22–29)
HCT VFR BLD CALC: 35.3 % — SIGNIFICANT CHANGE UP (ref 34.5–45)
HCT VFR BLD CALC: 36.9 % — SIGNIFICANT CHANGE UP (ref 34.5–45)
HGB BLD-MCNC: 10.8 G/DL — LOW (ref 11.5–15.5)
HGB BLD-MCNC: 11.4 G/DL — LOW (ref 11.5–15.5)
HPIV3 RNA SPEC QL NAA+PROBE: DETECTED
IMM GRANULOCYTES NFR BLD AUTO: 1.1 % — HIGH (ref 0–0.9)
KETONES UR-MCNC: NEGATIVE MG/DL — SIGNIFICANT CHANGE UP
LACTATE SERPL-SCNC: 1.1 MMOL/L — SIGNIFICANT CHANGE UP (ref 0.7–2)
LACTATE SERPL-SCNC: 2.5 MMOL/L — HIGH (ref 0.7–2)
LEUKOCYTE ESTERASE UR-ACNC: ABNORMAL
LYMPHOCYTES # BLD AUTO: 1.31 K/UL — SIGNIFICANT CHANGE UP (ref 1–3.3)
LYMPHOCYTES # BLD AUTO: 23.9 % — SIGNIFICANT CHANGE UP (ref 13–44)
MCHC RBC-ENTMCNC: 24.7 PG — LOW (ref 27–34)
MCHC RBC-ENTMCNC: 24.8 PG — LOW (ref 27–34)
MCHC RBC-ENTMCNC: 30.6 GM/DL — LOW (ref 32–36)
MCHC RBC-ENTMCNC: 30.9 GM/DL — LOW (ref 32–36)
MCV RBC AUTO: 80.2 FL — SIGNIFICANT CHANGE UP (ref 80–100)
MCV RBC AUTO: 80.6 FL — SIGNIFICANT CHANGE UP (ref 80–100)
MONOCYTES # BLD AUTO: 0.48 K/UL — SIGNIFICANT CHANGE UP (ref 0–0.9)
MONOCYTES NFR BLD AUTO: 8.7 % — SIGNIFICANT CHANGE UP (ref 2–14)
NEUTROPHILS # BLD AUTO: 3.5 K/UL — SIGNIFICANT CHANGE UP (ref 1.8–7.4)
NEUTROPHILS NFR BLD AUTO: 63.7 % — SIGNIFICANT CHANGE UP (ref 43–77)
NITRITE UR-MCNC: NEGATIVE — SIGNIFICANT CHANGE UP
NRBC # BLD: 0 /100 WBCS — SIGNIFICANT CHANGE UP (ref 0–0)
NRBC # BLD: 0 /100 WBCS — SIGNIFICANT CHANGE UP (ref 0–0)
NT-PROBNP SERPL-SCNC: 22 PG/ML — SIGNIFICANT CHANGE UP (ref 0–125)
PCO2 BLDV: 48 MMHG — HIGH (ref 39–42)
PH BLDV: 7.35 — SIGNIFICANT CHANGE UP (ref 7.32–7.43)
PH UR: 6 — SIGNIFICANT CHANGE UP (ref 5–8)
PLATELET # BLD AUTO: 142 K/UL — LOW (ref 150–400)
PLATELET # BLD AUTO: 186 K/UL — SIGNIFICANT CHANGE UP (ref 150–400)
PO2 BLDV: 39 MMHG — SIGNIFICANT CHANGE UP (ref 25–45)
POTASSIUM SERPL-MCNC: 3.2 MMOL/L — LOW (ref 3.5–5.3)
POTASSIUM SERPL-MCNC: 4.2 MMOL/L — SIGNIFICANT CHANGE UP (ref 3.5–5.3)
POTASSIUM SERPL-SCNC: 3.2 MMOL/L — LOW (ref 3.5–5.3)
POTASSIUM SERPL-SCNC: 4.2 MMOL/L — SIGNIFICANT CHANGE UP (ref 3.5–5.3)
PROT SERPL-MCNC: 6.6 G/DL — SIGNIFICANT CHANGE UP (ref 6–8.3)
PROT SERPL-MCNC: 7 G/DL — SIGNIFICANT CHANGE UP (ref 6–8.3)
PROT UR-MCNC: NEGATIVE MG/DL — SIGNIFICANT CHANGE UP
RAPID RVP RESULT: DETECTED
RBC # BLD: 4.38 M/UL — SIGNIFICANT CHANGE UP (ref 3.8–5.2)
RBC # BLD: 4.6 M/UL — SIGNIFICANT CHANGE UP (ref 3.8–5.2)
RBC # FLD: 16 % — HIGH (ref 10.3–14.5)
RBC # FLD: 16.1 % — HIGH (ref 10.3–14.5)
SAO2 % BLDV: 67 % — SIGNIFICANT CHANGE UP (ref 67–88)
SARS-COV-2 RNA SPEC QL NAA+PROBE: SIGNIFICANT CHANGE UP
SODIUM SERPL-SCNC: 137 MMOL/L — SIGNIFICANT CHANGE UP (ref 135–145)
SODIUM SERPL-SCNC: 139 MMOL/L — SIGNIFICANT CHANGE UP (ref 135–145)
SP GR SPEC: 1.02 — SIGNIFICANT CHANGE UP (ref 1–1.03)
UROBILINOGEN FLD QL: 0.2 MG/DL — SIGNIFICANT CHANGE UP (ref 0.2–1)
WBC # BLD: 3.38 K/UL — LOW (ref 3.8–10.5)
WBC # BLD: 5.49 K/UL — SIGNIFICANT CHANGE UP (ref 3.8–10.5)
WBC # FLD AUTO: 3.38 K/UL — LOW (ref 3.8–10.5)
WBC # FLD AUTO: 5.49 K/UL — SIGNIFICANT CHANGE UP (ref 3.8–10.5)

## 2024-05-27 PROCEDURE — 93010 ELECTROCARDIOGRAM REPORT: CPT

## 2024-05-27 PROCEDURE — 99232 SBSQ HOSP IP/OBS MODERATE 35: CPT

## 2024-05-27 PROCEDURE — 71250 CT THORAX DX C-: CPT | Mod: 26,MC

## 2024-05-27 PROCEDURE — 71045 X-RAY EXAM CHEST 1 VIEW: CPT | Mod: 26

## 2024-05-27 RX ORDER — AZITHROMYCIN 500 MG/1
500 TABLET, FILM COATED ORAL EVERY 24 HOURS
Refills: 0 | Status: DISCONTINUED | OUTPATIENT
Start: 2024-05-28 | End: 2024-05-29

## 2024-05-27 RX ORDER — IPRATROPIUM/ALBUTEROL SULFATE 18-103MCG
3 AEROSOL WITH ADAPTER (GRAM) INHALATION ONCE
Refills: 0 | Status: COMPLETED | OUTPATIENT
Start: 2024-05-27 | End: 2024-05-27

## 2024-05-27 RX ORDER — SODIUM CHLORIDE 9 MG/ML
1000 INJECTION INTRAMUSCULAR; INTRAVENOUS; SUBCUTANEOUS ONCE
Refills: 0 | Status: COMPLETED | OUTPATIENT
Start: 2024-05-27 | End: 2024-05-27

## 2024-05-27 RX ORDER — BUDESONIDE AND FORMOTEROL FUMARATE DIHYDRATE 160; 4.5 UG/1; UG/1
2 AEROSOL RESPIRATORY (INHALATION)
Refills: 0 | Status: DISCONTINUED | OUTPATIENT
Start: 2024-05-27 | End: 2024-05-29

## 2024-05-27 RX ORDER — PROPRANOLOL HCL 160 MG
1 CAPSULE, EXTENDED RELEASE 24HR ORAL
Refills: 0 | DISCHARGE

## 2024-05-27 RX ORDER — DEXTROSE 50 % IN WATER 50 %
25 SYRINGE (ML) INTRAVENOUS ONCE
Refills: 0 | Status: DISCONTINUED | OUTPATIENT
Start: 2024-05-27 | End: 2024-05-29

## 2024-05-27 RX ORDER — ALBUTEROL 90 UG/1
2 AEROSOL, METERED ORAL
Refills: 0 | DISCHARGE

## 2024-05-27 RX ORDER — METFORMIN HYDROCHLORIDE 850 MG/1
1 TABLET ORAL
Refills: 0 | DISCHARGE

## 2024-05-27 RX ORDER — LOSARTAN POTASSIUM 100 MG/1
1 TABLET, FILM COATED ORAL
Qty: 0 | Refills: 0 | DISCHARGE

## 2024-05-27 RX ORDER — ACETAMINOPHEN 500 MG
650 TABLET ORAL EVERY 6 HOURS
Refills: 0 | Status: DISCONTINUED | OUTPATIENT
Start: 2024-05-27 | End: 2024-05-29

## 2024-05-27 RX ORDER — ASPIRIN/CALCIUM CARB/MAGNESIUM 324 MG
81 TABLET ORAL DAILY
Refills: 0 | Status: DISCONTINUED | OUTPATIENT
Start: 2024-05-27 | End: 2024-05-29

## 2024-05-27 RX ORDER — FERROUS SULFATE 325(65) MG
0 TABLET ORAL
Qty: 0 | Refills: 0 | DISCHARGE

## 2024-05-27 RX ORDER — PROPRANOLOL HCL 160 MG
0 CAPSULE, EXTENDED RELEASE 24HR ORAL
Qty: 0 | Refills: 0 | DISCHARGE

## 2024-05-27 RX ORDER — LOSARTAN POTASSIUM 100 MG/1
100 TABLET, FILM COATED ORAL DAILY
Refills: 0 | Status: DISCONTINUED | OUTPATIENT
Start: 2024-05-27 | End: 2024-05-27

## 2024-05-27 RX ORDER — LANOLIN ALCOHOL/MO/W.PET/CERES
3 CREAM (GRAM) TOPICAL AT BEDTIME
Refills: 0 | Status: DISCONTINUED | OUTPATIENT
Start: 2024-05-27 | End: 2024-05-29

## 2024-05-27 RX ORDER — ONDANSETRON 8 MG/1
4 TABLET, FILM COATED ORAL EVERY 8 HOURS
Refills: 0 | Status: DISCONTINUED | OUTPATIENT
Start: 2024-05-27 | End: 2024-05-29

## 2024-05-27 RX ORDER — SODIUM CHLORIDE 9 MG/ML
1000 INJECTION, SOLUTION INTRAVENOUS
Refills: 0 | Status: DISCONTINUED | OUTPATIENT
Start: 2024-05-27 | End: 2024-05-29

## 2024-05-27 RX ORDER — ENOXAPARIN SODIUM 100 MG/ML
40 INJECTION SUBCUTANEOUS EVERY 12 HOURS
Refills: 0 | Status: DISCONTINUED | OUTPATIENT
Start: 2024-05-27 | End: 2024-05-29

## 2024-05-27 RX ORDER — CEFTRIAXONE 500 MG/1
1000 INJECTION, POWDER, FOR SOLUTION INTRAMUSCULAR; INTRAVENOUS EVERY 24 HOURS
Refills: 0 | Status: DISCONTINUED | OUTPATIENT
Start: 2024-05-28 | End: 2024-05-29

## 2024-05-27 RX ORDER — DEXTROSE 50 % IN WATER 50 %
15 SYRINGE (ML) INTRAVENOUS ONCE
Refills: 0 | Status: DISCONTINUED | OUTPATIENT
Start: 2024-05-27 | End: 2024-05-29

## 2024-05-27 RX ORDER — ATORVASTATIN CALCIUM 80 MG/1
1 TABLET, FILM COATED ORAL
Qty: 0 | Refills: 0 | DISCHARGE

## 2024-05-27 RX ORDER — INSULIN LISPRO 100/ML
VIAL (ML) SUBCUTANEOUS
Refills: 0 | Status: DISCONTINUED | OUTPATIENT
Start: 2024-05-27 | End: 2024-05-29

## 2024-05-27 RX ORDER — ATORVASTATIN CALCIUM 80 MG/1
20 TABLET, FILM COATED ORAL AT BEDTIME
Refills: 0 | Status: DISCONTINUED | OUTPATIENT
Start: 2024-05-27 | End: 2024-05-29

## 2024-05-27 RX ORDER — ALBUTEROL 90 UG/1
1 AEROSOL, METERED ORAL EVERY 6 HOURS
Refills: 0 | Status: DISCONTINUED | OUTPATIENT
Start: 2024-05-27 | End: 2024-05-29

## 2024-05-27 RX ORDER — IPRATROPIUM/ALBUTEROL SULFATE 18-103MCG
3 AEROSOL WITH ADAPTER (GRAM) INHALATION EVERY 6 HOURS
Refills: 0 | Status: DISCONTINUED | OUTPATIENT
Start: 2024-05-27 | End: 2024-05-29

## 2024-05-27 RX ORDER — AZITHROMYCIN 500 MG/1
500 TABLET, FILM COATED ORAL ONCE
Refills: 0 | Status: COMPLETED | OUTPATIENT
Start: 2024-05-27 | End: 2024-05-27

## 2024-05-27 RX ORDER — LOSARTAN POTASSIUM 100 MG/1
1 TABLET, FILM COATED ORAL
Refills: 0 | DISCHARGE

## 2024-05-27 RX ORDER — CEFTRIAXONE 500 MG/1
1000 INJECTION, POWDER, FOR SOLUTION INTRAMUSCULAR; INTRAVENOUS ONCE
Refills: 0 | Status: COMPLETED | OUTPATIENT
Start: 2024-05-27 | End: 2024-05-27

## 2024-05-27 RX ORDER — INSULIN LISPRO 100/ML
VIAL (ML) SUBCUTANEOUS AT BEDTIME
Refills: 0 | Status: DISCONTINUED | OUTPATIENT
Start: 2024-05-27 | End: 2024-05-29

## 2024-05-27 RX ORDER — GLUCAGON INJECTION, SOLUTION 0.5 MG/.1ML
1 INJECTION, SOLUTION SUBCUTANEOUS ONCE
Refills: 0 | Status: DISCONTINUED | OUTPATIENT
Start: 2024-05-27 | End: 2024-05-29

## 2024-05-27 RX ORDER — TIOTROPIUM BROMIDE 18 UG/1
2 CAPSULE ORAL; RESPIRATORY (INHALATION) DAILY
Refills: 0 | Status: DISCONTINUED | OUTPATIENT
Start: 2024-05-27 | End: 2024-05-28

## 2024-05-27 RX ORDER — ALBUTEROL 90 UG/1
2 AEROSOL, METERED ORAL EVERY 6 HOURS
Refills: 0 | Status: DISCONTINUED | OUTPATIENT
Start: 2024-05-27 | End: 2024-05-29

## 2024-05-27 RX ORDER — ATORVASTATIN CALCIUM 80 MG/1
1 TABLET, FILM COATED ORAL
Refills: 0 | DISCHARGE

## 2024-05-27 RX ORDER — BENZOCAINE AND MENTHOL 5; 1 G/100ML; G/100ML
1 LIQUID ORAL
Refills: 0 | Status: DISCONTINUED | OUTPATIENT
Start: 2024-05-27 | End: 2024-05-29

## 2024-05-27 RX ORDER — DEXTROSE 50 % IN WATER 50 %
12.5 SYRINGE (ML) INTRAVENOUS ONCE
Refills: 0 | Status: DISCONTINUED | OUTPATIENT
Start: 2024-05-27 | End: 2024-05-29

## 2024-05-27 RX ORDER — DEXTROSE 10 % IN WATER 10 %
125 INTRAVENOUS SOLUTION INTRAVENOUS ONCE
Refills: 0 | Status: DISCONTINUED | OUTPATIENT
Start: 2024-05-27 | End: 2024-05-29

## 2024-05-27 RX ORDER — MECLIZINE HCL 12.5 MG
25 TABLET ORAL DAILY
Refills: 0 | Status: DISCONTINUED | OUTPATIENT
Start: 2024-05-27 | End: 2024-05-29

## 2024-05-27 RX ADMIN — Medication 125 MILLIGRAM(S): at 02:46

## 2024-05-27 RX ADMIN — Medication 2: at 12:37

## 2024-05-27 RX ADMIN — TIOTROPIUM BROMIDE 2 PUFF(S): 18 CAPSULE ORAL; RESPIRATORY (INHALATION) at 08:19

## 2024-05-27 RX ADMIN — SODIUM CHLORIDE 1000 MILLILITER(S): 9 INJECTION INTRAMUSCULAR; INTRAVENOUS; SUBCUTANEOUS at 06:32

## 2024-05-27 RX ADMIN — Medication 40 MILLIGRAM(S): at 21:42

## 2024-05-27 RX ADMIN — ENOXAPARIN SODIUM 40 MILLIGRAM(S): 100 INJECTION SUBCUTANEOUS at 17:10

## 2024-05-27 RX ADMIN — SODIUM CHLORIDE 1000 MILLILITER(S): 9 INJECTION INTRAMUSCULAR; INTRAVENOUS; SUBCUTANEOUS at 00:58

## 2024-05-27 RX ADMIN — Medication 81 MILLIGRAM(S): at 12:38

## 2024-05-27 RX ADMIN — Medication 650 MILLIGRAM(S): at 17:13

## 2024-05-27 RX ADMIN — ATORVASTATIN CALCIUM 20 MILLIGRAM(S): 80 TABLET, FILM COATED ORAL at 21:42

## 2024-05-27 RX ADMIN — BUDESONIDE AND FORMOTEROL FUMARATE DIHYDRATE 2 PUFF(S): 160; 4.5 AEROSOL RESPIRATORY (INHALATION) at 20:31

## 2024-05-27 RX ADMIN — Medication 800 MILLIGRAM(S): at 00:50

## 2024-05-27 RX ADMIN — BUDESONIDE AND FORMOTEROL FUMARATE DIHYDRATE 2 PUFF(S): 160; 4.5 AEROSOL RESPIRATORY (INHALATION) at 08:24

## 2024-05-27 RX ADMIN — AZITHROMYCIN 500 MILLIGRAM(S): 500 TABLET, FILM COATED ORAL at 03:59

## 2024-05-27 RX ADMIN — Medication 650 MILLIGRAM(S): at 17:43

## 2024-05-27 RX ADMIN — AZITHROMYCIN 255 MILLIGRAM(S): 500 TABLET, FILM COATED ORAL at 03:08

## 2024-05-27 RX ADMIN — Medication 2: at 08:19

## 2024-05-27 RX ADMIN — Medication 3 MILLILITER(S): at 02:51

## 2024-05-27 RX ADMIN — CEFTRIAXONE 1000 MILLIGRAM(S): 500 INJECTION, POWDER, FOR SOLUTION INTRAMUSCULAR; INTRAVENOUS at 03:10

## 2024-05-27 RX ADMIN — CEFTRIAXONE 100 MILLIGRAM(S): 500 INJECTION, POWDER, FOR SOLUTION INTRAMUSCULAR; INTRAVENOUS at 02:44

## 2024-05-27 NOTE — H&P ADULT - HISTORY OF PRESENT ILLNESS
57 yo F PMH Asthma, HTN, DM2, HLD who presents with 6 days of cough with productive sputum (green in color), sore throat, and congestion along with subjective low grade fever of 100.7. Pt state that she went to urgent care yesterday and was given cefpodoxime and antitussive but with little improvement. Pt noticed that he asthma was flaring as a result of the illness and was using her rescue inhaler and nebulizer which she states did help with her symptoms. Endorses generalized malaise, no nausea, no vomiting, no chest pain, no palpitations, no edema, no pleuritic chest pain, no recent weight gain. Pt endorses that her son came back from [place] with some URI sxs.  Pt also endorses dysuria.     No ETOH, illicit drugs or smoking. Ambulates without hte use of walker or cane.

## 2024-05-27 NOTE — CONSULT NOTE ADULT - SUBJECTIVE AND OBJECTIVE BOX
HPI:  57YO F PMH Asthma, HTN, DM2, HLD who presented to the hospital with c/o 1 week history of  cough with productive sputum (green in color), sore throat, and congestion along with subjective low grade fever of 100.7. Was seen at urgent care day PTA  and was given cefpodoxime and antitussive but with little improvement. No n/v/d CP abd pain. In ED Tmax 100.7 WBC wnl. RVP with parainfluenza CT chest with mariana pneumonia. No recent travel Son had URI.    Infectious Disease consult was called to evaluate pt and for antibiotic management.    Past Medical & Surgical Hx:  PAST MEDICAL & SURGICAL HISTORY:  Benign Essential Hypertension  GERD (Gastroesophageal Reflux Disease)  Asthma  Right-sided carotid artery disease  MVP (mitral valve prolapse)  HTN (hypertension)  DM (diabetes mellitus)  High cholesterol  C Section  Bilateral Inguinal Hernia (BIH)      Social History--  EtOH: denies   Tobacco: denies   Drug Use: denies       FAMILY HISTORY:  Family history of uterine cancer (Mother)    Family history of hypertension (Mother, Father)        Allergies  No Known Drug Allergies  shellfish (Hives)  seasonal (Sneezing)  shellfish (Unknown)    Intolerances  NONE      Home Medications:  albuterol 90 mcg/inh inhalation aerosol: 2 puff(s) inhaled every 6 hours as needed for  shortness of breath and/or wheezing (27 May 2024 05:43)  Aspir 81 oral delayed release tablet: 1 tab(s) orally once a day (22 Jan 2023 09:32)  atorvastatin 20 mg oral tablet: 1 tab(s) orally once a day (at bedtime) (27 May 2024 05:43)  ferrous fumarate 325 mg (106 mg elemental iron) oral tablet: 1 tab(s) orally once a day (27 May 2024 05:43)  fluticasone/umeclidinium/vilanterol 200 mcg-62.5 mcg-25 mcg/inh inhalation powder: 2 puff(s) inhaled once a day (27 May 2024 05:43)  hydroCHLOROthiazide 25 mg oral tablet: 1 tab(s) orally once a day (27 May 2024 05:43)  losartan 100 mg oral tablet: 1 tab(s) orally once a day (27 May 2024 05:43)  meclizine 25 mg oral tablet: 1 tab(s) orally once a day as needed for  dizziness (27 May 2024 05:43)  metFORMIN 1000 mg oral tablet: 1 tab(s) orally 2 times a day (27 May 2024 05:43)  propranolol 60 mg oral tablet: 1 tab(s) orally once a day (27 May 2024 05:43)      Current Inpatient Medications :    ANTIBIOTICS:   azithromycin  IVPB 500 milliGRAM(s) IV Intermittent every 24 hours  cefTRIAXone   IVPB 1000 milliGRAM(s) IV Intermittent every 24 hours      OTHER RELEVANT MEDICATIONS :  acetaminophen     Tablet .. 650 milliGRAM(s) Oral every 6 hours PRN  albuterol    90 MICROgram(s) HFA Inhaler 2 Puff(s) Inhalation every 6 hours PRN  albuterol    90 MICROgram(s) HFA Inhaler 1 Puff(s) Inhalation every 6 hours PRN  albuterol/ipratropium for Nebulization 3 milliLiter(s) Nebulizer every 6 hours PRN  aluminum hydroxide/magnesium hydroxide/simethicone Suspension 30 milliLiter(s) Oral every 4 hours PRN  aspirin enteric coated 81 milliGRAM(s) Oral daily  atorvastatin 20 milliGRAM(s) Oral at bedtime  benzocaine/menthol Lozenge 1 Lozenge Oral every 3 hours PRN  budesonide  80 MICROgram(s)/formoterol 4.5 MICROgram(s) Inhaler 2 Puff(s) Inhalation two times a day  dextrose 10% Bolus 125 milliLiter(s) IV Bolus once  dextrose 5%. 1000 milliLiter(s) IV Continuous <Continuous>  dextrose 5%. 1000 milliLiter(s) IV Continuous <Continuous>  dextrose 50% Injectable 25 Gram(s) IV Push once  dextrose 50% Injectable 12.5 Gram(s) IV Push once  dextrose Oral Gel 15 Gram(s) Oral once PRN  enoxaparin Injectable 40 milliGRAM(s) SubCutaneous every 12 hours  glucagon  Injectable 1 milliGRAM(s) IntraMuscular once  guaiFENesin Oral Liquid (Sugar-Free) 200 milliGRAM(s) Oral every 6 hours PRN  insulin lispro (ADMELOG) corrective regimen sliding scale   SubCutaneous three times a day before meals  insulin lispro (ADMELOG) corrective regimen sliding scale   SubCutaneous at bedtime  meclizine 25 milliGRAM(s) Oral daily PRN  melatonin 3 milliGRAM(s) Oral at bedtime PRN  methylPREDNISolone sodium succinate Injectable 40 milliGRAM(s) IV Push three times a day  ondansetron Injectable 4 milliGRAM(s) IV Push every 8 hours PRN  propranolol 40 milliGRAM(s) Oral daily  tiotropium 2.5 MICROgram(s) Inhaler 2 Puff(s) Inhalation daily      ROS:  CONSTITUTIONAL:  Negative fever or chills  EYES:  Negative  blurry vision or double vision  CARDIOVASCULAR:  Negative for chest pain or palpitations  RESPIRATORY:  Negative for cough, wheezing, or SOB   GASTROINTESTINAL:  Negative for nausea, vomiting, diarrhea, constipation, or abdominal pain  GENITOURINARY:  Negative frequency, urgency , dysuria or hematuria   NEUROLOGIC:  No headache, confusion, dizziness, lightheadedness  All other systems were reviewed and are negative      Physical Exam:  Vital Signs Last 24 Hrs  T(C): 36.2 (27 May 2024 21:57), Max: 38.2 (26 May 2024 23:14)  T(F): 97.2 (27 May 2024 21:57), Max: 100.7 (26 May 2024 23:14)  HR: 96 (27 May 2024 21:57) (95 - 126)  BP: 131/61 (27 May 2024 21:57) (112/73 - 131/61)  BP(mean): --  RR: 16 (27 May 2024 21:57) (16 - 24)  SpO2: 96% (27 May 2024 21:57) (95% - 98%)    Parameters below as of 27 May 2024 09:46  Patient On (Oxygen Delivery Method): room air      Height (cm): 162.6 (05-26 @ 23:14)  Weight (kg): 123.4 (05-26 @ 23:14)  BMI (kg/m2): 46.7 (05-26 @ 23:14)  BSA (m2): 2.23 (05-26 @ 23:14)    General: well developed well nourished, in no acute distress  Neck: supple, trachea midline  Lungs: clear, no wheeze/rhonchi  Cardiovascular: regular rate and rhythm, S1 S2  Abdomen: soft, nontender, ND, bowel sounds normal  Neurological:  alert and oriented x3  Skin: no rash  Extremities: no cyanosis/clubbing/edema    Labs:                         10.8   3.38  )-----------( 142      ( 27 May 2024 05:30 )             35.3   05-27    139  |  105  |  11  ----------------------------<  195<H>  3.2<L>   |  27  |  0.93    Ca    9.1      27 May 2024 05:30    TPro  6.6  /  Alb  2.9<L>  /  TBili  0.3  /  DBili  x   /  AST  24  /  ALT  38  /  AlkPhos  86  05-27    Urine Microscopic-Add On (NC) (05.27.24 @ 01:38)    Red Blood Cell - Urine: 2 /HPF   White Blood Cell - Urine: 7 /HPF   Hyaline Casts: 0 - 1 /LPF   Bacteria: Occasional /HPF   Squamous Epithelial Cells: Present  Urinalysis (05.27.24 @ 01:38)    Blood, Urine: Negative   Glucose Qualitative, Urine: Negative mg/dL   pH Urine: 6.0   Color: Yellow   Urine Appearance: Clear   Bilirubin: Negative   Ketone - Urine: Negative mg/dL   Specific Gravity: 1.019   Protein, Urine: Negative mg/dL   Urobilinogen: 0.2 mg/dL   Nitrite: Negative   Leukocyte Esterase Concentration: Small    RECENT CULTURES:  Respiratory Viral Panel with COVID-19 by FEDERICO (05.26.24 @ 23:53)    Rapid RVP Result: Detected   SARS-CoV-2: NotDetec: This Respiratory Panel uses polymerase chain reaction (PCR) to detect for  adenovirus; coronavirus (HKU1, NL63, 229E, OC43); human metapneumovirus  (hMPV); human enterovirus/rhinovirus (Entero/RV); influenza A; influenza  A/H1; influenza A/H3; influenza A/H1-2009; influenza B; parainfluenza  viruses 1, 2, 3, 4; respiratory syncytial virus; Mycoplasma pneumoniae;  Chlamydophila pneumoniae; and SARS-CoV-2.   Parainfluenza 3 (RapRVP): Detected      RADIOLOGY & ADDITIONAL STUDIES:    ACC: [id number] EXAM:  CT CHEST   ORDERED BY: ANDREI HERNANDEZ     PROCEDURE DATE:  05/27/2024          INTERPRETATION:  CLINICAL INFORMATION: Fever, cough, evaluate for   pneumonia    COMPARISON: CTA chest performed 9/9/2020.    CONTRAST/COMPLICATIONS:  IV Contrast: NONE  Oral Contrast: NONE  Complications: None reported at time of study completion    PROCEDURE:  CT of the Chest was performed.  Sagittal and coronal reformats were performed.    FINDINGS:    LUNGS AND AIRWAYS: Patent central airways.  Nodular airspace opacities in   the superior segment of the right lower lobe and left lower lobe most   consistent with acute infection.  PLEURA: No pleural effusion.  MEDIASTINUM AND PEPITO: Multiple mildly prominent mediastinal lymph nodes,   most likely reactive.  VESSELS: Aorta not aneurysmal. Limited evaluation due to lack of IV   contrast.  HEART: Heart size is normal. No pericardial effusion.  CHEST WALL AND LOWER NECK: Within normal limits.  VISUALIZED UPPER ABDOMEN: Hepatomegaly andhepatic steatosis.   Cholecystectomy. Partially visualized sequela from prior abdominal wall   hernia repair.  BONES: DISH in the thoracic spine.    IMPRESSION:  Findings most consistent with bilateral lower lobe pneumonia.      Assessment :   57YO F PMH Asthma, HTN, DM2, HLD who presented to the hospital with c/o 1 week history of  cough with productive sputum (green in color), sore throat, and congestion along with subjective low grade fever of 100.7. Was seen at urgent care day PTA  and was given cefpodoxime and antitussive but with little improvement. No n/v/d CP abd pain. In ED Tmax 100.7 WBC wnl. RVP with parainfluenza CT chest with mariana pneumonia.  Viral bronchitis with asthma exacerbation   Poss superimposed bacterial pna      Plan  Cont Rocephin Zithromax  Fu cultures  Trend temps and cbc  Legionella pneumococcal urinary antigen  Procalcitonin level in am  Steroids nebs per pulm  Pulm toileting    Continue with present regiment .  Approptiate use of antibiotics and adverse effects reviewed.      I have discussed the above plan of care with patient/family in detail. They expressed understanding of the treatment plan . Risks, benefits and alternatives discussed in detail. I have asked if they have any questions or concerns and appropriately addressed them to the best of my ability .      > 45 minutes spent in direct patient care reviewing  the notes, lab data/ imaging , discussion with multidisciplinary team. All questions were addressed and answered to the best of my capacity .    Thank you for allowing me to participate in the care of your patient .      Khalida Banda MD  Infectious Disease  [phone]
Date/Time Patient Seen:  		  Referring MD:   Data Reviewed	       Patient is a 58y old  Female who presents with a chief complaint of Viral PNA with possible superimposed bacterial PNA (27 May 2024 14:32)      Subjective/HPI   59 yo F PMH Asthma, HTN, DM2, HLD who presents with 6 days of cough with productive sputum (green in color), sore throat, and congestion along with subjective low grade fever of 100.7  PAST MEDICAL & SURGICAL HISTORY:  Benign Essential Hypertension    GERD (Gastroesophageal Reflux Disease)    Asthma    Right-sided carotid artery disease    MVP (mitral valve prolapse)    HTN (hypertension)    DM (diabetes mellitus)    High cholesterol    C Section    Bilateral Inguinal Hernia (BIH)    PAST SURGICAL HISTORY:  Bilateral Inguinal Hernia (BIH)     C Section.     FAMILY HISTORY:  Father  Still living? Unknown  Family history of hypertension, Age at diagnosis: Age Unknown    Mother  Still living? Unknown  Family history of hypertension, Age at diagnosis: Age Unknown  Family history of uterine cancer, Age at diagnosis: Age Unknown.     Social History:  · Substance use	No  · Social History (marital status, living situation, occupation, and sexual history)	lives with . Works parttime helping and .     Tobacco Screening:  · Core Measure Site	No  · Has the patient used tobacco in the past 30 days?	Yes    Risk Assessment:    Present on Admission:  Deep Venous Thrombosis	no  Pulmonary Embolus	no     HIV Screening:  · In accordance with [place] State law, we offer every patient who comes to our ED an HIV test. Would you like to be tested today?	Opt out        Medication list         MEDICATIONS  (STANDING):  aspirin enteric coated 81 milliGRAM(s) Oral daily  atorvastatin 20 milliGRAM(s) Oral at bedtime  azithromycin  IVPB 500 milliGRAM(s) IV Intermittent every 24 hours  budesonide  80 MICROgram(s)/formoterol 4.5 MICROgram(s) Inhaler 2 Puff(s) Inhalation two times a day  cefTRIAXone   IVPB 1000 milliGRAM(s) IV Intermittent every 24 hours  dextrose 10% Bolus 125 milliLiter(s) IV Bolus once  dextrose 5%. 1000 milliLiter(s) (50 mL/Hr) IV Continuous <Continuous>  dextrose 5%. 1000 milliLiter(s) (100 mL/Hr) IV Continuous <Continuous>  dextrose 50% Injectable 25 Gram(s) IV Push once  dextrose 50% Injectable 12.5 Gram(s) IV Push once  enoxaparin Injectable 40 milliGRAM(s) SubCutaneous every 12 hours  glucagon  Injectable 1 milliGRAM(s) IntraMuscular once  insulin lispro (ADMELOG) corrective regimen sliding scale   SubCutaneous three times a day before meals  insulin lispro (ADMELOG) corrective regimen sliding scale   SubCutaneous at bedtime  methylPREDNISolone sodium succinate Injectable 40 milliGRAM(s) IV Push three times a day  propranolol 40 milliGRAM(s) Oral daily  tiotropium 2.5 MICROgram(s) Inhaler 2 Puff(s) Inhalation daily    MEDICATIONS  (PRN):  acetaminophen     Tablet .. 650 milliGRAM(s) Oral every 6 hours PRN Temp greater or equal to 38C (100.4F), Mild Pain (1 - 3)  albuterol    90 MICROgram(s) HFA Inhaler 2 Puff(s) Inhalation every 6 hours PRN for shortness of breath and/or wheezing  albuterol    90 MICROgram(s) HFA Inhaler 1 Puff(s) Inhalation every 6 hours PRN Shortness of Breath and/or Wheezing  albuterol/ipratropium for Nebulization 3 milliLiter(s) Nebulizer every 6 hours PRN Shortness of Breath and/or Wheezing  aluminum hydroxide/magnesium hydroxide/simethicone Suspension 30 milliLiter(s) Oral every 4 hours PRN Dyspepsia  benzocaine/menthol Lozenge 1 Lozenge Oral every 3 hours PRN Sore Throat  dextrose Oral Gel 15 Gram(s) Oral once PRN Blood Glucose LESS THAN 70 milliGRAM(s)/deciliter  guaiFENesin Oral Liquid (Sugar-Free) 200 milliGRAM(s) Oral every 6 hours PRN Cough  meclizine 25 milliGRAM(s) Oral daily PRN for dizziness  melatonin 3 milliGRAM(s) Oral at bedtime PRN Insomnia  ondansetron Injectable 4 milliGRAM(s) IV Push every 8 hours PRN Nausea and/or Vomiting         Vitals log        ICU Vital Signs Last 24 Hrs  T(C): 36.7 (27 May 2024 09:46), Max: 38.2 (26 May 2024 23:14)  T(F): 98.1 (27 May 2024 09:46), Max: 100.7 (26 May 2024 23:14)  HR: 95 (27 May 2024 09:46) (95 - 126)  BP: 126/79 (27 May 2024 09:46) (112/73 - 126/79)  BP(mean): --  ABP: --  ABP(mean): --  RR: 18 (27 May 2024 09:46) (18 - 24)  SpO2: 97% (27 May 2024 09:46) (95% - 98%)    O2 Parameters below as of 27 May 2024 09:46  Patient On (Oxygen Delivery Method): room air                 Input and Output:  I&O's Detail      Lab Data                        10.8   3.38  )-----------( 142      ( 27 May 2024 05:30 )             35.3     05-27    139  |  105  |  11  ----------------------------<  195<H>  3.2<L>   |  27  |  0.93    Ca    9.1      27 May 2024 05:30    TPro  6.6  /  Alb  2.9<L>  /  TBili  0.3  /  DBili  x   /  AST  24  /  ALT  38  /  AlkPhos  86  05-27            Review of Systems	  cough  weakness  malaise      Objective     Physical Examination    heart s1s2  lung dc BS  head nc      Pertinent Lab findings & Imaging      Abdul:  NO   Adequate UO     I&O's Detail           Discussed with:     Cultures:	        Radiology    ACC: [id number] EXAM:  CT CHEST   ORDERED BY: ANDREI HERNANDEZ     PROCEDURE DATE:  05/27/2024          INTERPRETATION:  CLINICAL INFORMATION: Fever, cough, evaluate for   pneumonia    COMPARISON: CTA chest performed 9/9/2020.    CONTRAST/COMPLICATIONS:  IV Contrast: NONE  Oral Contrast: NONE  Complications: None reported at time of study completion    PROCEDURE:  CT of the Chest was performed.  Sagittal and coronal reformats were performed.    FINDINGS:    LUNGS AND AIRWAYS: Patent central airways.  Nodular airspace opacities in   the superior segment of the right lower lobe and left lower lobe most   consistent with acute infection.  PLEURA: No pleural effusion.  MEDIASTINUM AND PEPITO: Multiple mildly prominent mediastinal lymph nodes,   most likely reactive.  VESSELS: Aorta not aneurysmal. Limited evaluation due to lack of IV   contrast.  HEART: Heart size is normal. No pericardial effusion.  CHEST WALL AND LOWER NECK: Within normal limits.  VISUALIZED UPPER ABDOMEN: Hepatomegaly and hepatic steatosis.   Cholecystectomy. Partially visualized sequela from prior abdominal wall   hernia repair.  BONES: DISH in the thoracic spine.    IMPRESSION:  Findings most consistent with bilateral lower lobe pneumonia.    --- End of Report ---            VERA BENNETT MD; Attending Radiologist  This document has been electronically signed. May 27 2024  2:01AM

## 2024-05-27 NOTE — CONSULT NOTE ADULT - REASON FOR ADMISSION
Viral PNA with possible superimposed bacterial PNA
Viral PNA with possible superimposed bacterial PNA

## 2024-05-27 NOTE — H&P ADULT - NSICDXFAMILYHX_GEN_ALL_CORE_FT
FAMILY HISTORY:  Father  Still living? Unknown  Family history of hypertension, Age at diagnosis: Age Unknown    Mother  Still living? Unknown  Family history of hypertension, Age at diagnosis: Age Unknown  Family history of uterine cancer, Age at diagnosis: Age Unknown    
Xray Knee 3 Views, Left

## 2024-05-27 NOTE — H&P ADULT - NSHPPHYSICALEXAM_GEN_ALL_CORE
T(C): 36.7 (05-27-24 @ 03:19), Max: 38.2 (05-26-24 @ 23:14)  HR: 104 (05-27-24 @ 03:19) (104 - 126)  BP: 114/71 (05-27-24 @ 03:19) (114/71 - 124/72)  RR: 22 (05-27-24 @ 03:19) (22 - 24)  SpO2: 98% (05-27-24 @ 03:19) (95% - 98%)    GENERAL: patient appears well, no acute distress, appropriate, pleasant, obese  EYES: sclera clear, no exudates  ENMT: oropharynx clear without erythema, no exudates, moist mucous membranes  NECK: supple, soft, no thyromegaly noted  LUNGS: decreased breath sounds globally. No rales auscultated. speaking in full sentences, no use of accessory muscles.  HEART: soft S1/S2, regular rate and rhythm, no murmurs noted, no lower extremity edema  GASTROINTESTINAL: abdomen is soft, nontender, nondistended, normoactive bowel sounds, no palpable masses  INTEGUMENT: good skin turgor, warm skin, appears well perfused  MUSCULOSKELETAL: no clubbing or cyanosis, no obvious deformity  NEUROLOGIC: awake, alert, oriented x3, good muscle tone in 4 extremities, no obvious sensory deficits  PSYCHIATRIC: mood is good, affect is congruent, linear and logical thought process  HEME/LYMPH: no palpable supraclavicular nodules, no obvious ecchymosis or petechiae

## 2024-05-27 NOTE — H&P ADULT - ASSESSMENT
59 yo F PMH Asthma, HTN, DM2, HLD who presents with 6 days of cough with productive sputum, sore throat, and congestion along with subjective low grade fever of 100.7. Imaging and labs suggestive of viral PNA with possible superimposed bacterial PNA.    #sepsis  #Viral PNA (parainfluenza)  #possible superimposed bacterial PNA  sepsis criteria met with fever, tachycardia, identifed source, symptoms improved with fluids and treatment   CT showing bibasilar PNA  Parainfluenza  Receved 2 L NS--has not completed sepsis bolus  will order another bolus  No WBC but pt is symptomatic  telemetry monitoring given sepsis  C/w ceftriaxone and azithromycin  antitussive and lozenges  Check legionella and strep  MRSA PCR  f/u Bcx and Ucx      #DM2  A1c ordered   ISS and POC glucose for now    #UTI  Ua mildly positive with sxs of dysuria   c/w ceftriaxone   F/u Ucx    #HTN  #HLD  BP currently controlled  hold home losartan and HCTZ  C/w propanolol, ASA, statin    DVT ppx; lovenox  Diet: diabetic/DASH 59 yo F PMH Asthma, HTN, DM2, HLD who presents with 6 days of cough with productive sputum, sore throat, and congestion along with subjective low grade fever of 100.7. Imaging and labs suggestive of viral PNA with possible superimposed bacterial PNA.    #sepsis  #Viral PNA (parainfluenza)  #possible superimposed bacterial PNA  sepsis criteria met with fever, tachycardia, identifed source, symptoms improved with fluids and treatment   CT showing bibasilar PNA  Parainfluenza  Receved 2 L NS--has not completed sepsis bolus  will order another bolus  No WBC but pt is symptomatic  telemetry monitoring given sepsis  C/w ceftriaxone and azithromycin  antitussive and lozenges  Check legionella and strep  MRSA PCR  f/u Bcx and Ucx      #DM2  A1c ordered   ISS and POC glucose for now    #UTI  Ua mildly positive with sxs of dysuria   c/w ceftriaxone   F/u Ucx    #HTN  #HLD  BP currently controlled  hold home losartan and HCTZ as BP is currently with systolic 114, restart when appropriate  C/w propanolol, ASA, statin    DVT ppx; lovenox  Diet: diabetic/DASH

## 2024-05-28 LAB
A1C WITH ESTIMATED AVERAGE GLUCOSE RESULT: 5.7 % — HIGH (ref 4–5.6)
CRP SERPL-MCNC: 44 MG/L — HIGH
CULTURE RESULTS: SIGNIFICANT CHANGE UP
ESTIMATED AVERAGE GLUCOSE: 117 MG/DL — HIGH (ref 68–114)
GLUCOSE BLDC GLUCOMTR-MCNC: 111 MG/DL — HIGH (ref 70–99)
GLUCOSE BLDC GLUCOMTR-MCNC: 123 MG/DL — HIGH (ref 70–99)
GLUCOSE BLDC GLUCOMTR-MCNC: 132 MG/DL — HIGH (ref 70–99)
GLUCOSE BLDC GLUCOMTR-MCNC: 144 MG/DL — HIGH (ref 70–99)
PROCALCITONIN SERPL-MCNC: 0.14 NG/ML — HIGH (ref 0.02–0.1)
SPECIMEN SOURCE: SIGNIFICANT CHANGE UP

## 2024-05-28 PROCEDURE — 99233 SBSQ HOSP IP/OBS HIGH 50: CPT

## 2024-05-28 RX ORDER — POTASSIUM CHLORIDE 20 MEQ
40 PACKET (EA) ORAL ONCE
Refills: 0 | Status: COMPLETED | OUTPATIENT
Start: 2024-05-28 | End: 2024-05-28

## 2024-05-28 RX ADMIN — CEFTRIAXONE 100 MILLIGRAM(S): 500 INJECTION, POWDER, FOR SOLUTION INTRAMUSCULAR; INTRAVENOUS at 02:08

## 2024-05-28 RX ADMIN — BUDESONIDE AND FORMOTEROL FUMARATE DIHYDRATE 2 PUFF(S): 160; 4.5 AEROSOL RESPIRATORY (INHALATION) at 22:19

## 2024-05-28 RX ADMIN — AZITHROMYCIN 255 MILLIGRAM(S): 500 TABLET, FILM COATED ORAL at 02:44

## 2024-05-28 RX ADMIN — Medication 40 MILLIEQUIVALENT(S): at 17:03

## 2024-05-28 RX ADMIN — Medication 20 MILLIGRAM(S): at 06:52

## 2024-05-28 RX ADMIN — ATORVASTATIN CALCIUM 20 MILLIGRAM(S): 80 TABLET, FILM COATED ORAL at 22:19

## 2024-05-28 RX ADMIN — Medication 81 MILLIGRAM(S): at 11:58

## 2024-05-28 RX ADMIN — ENOXAPARIN SODIUM 40 MILLIGRAM(S): 100 INJECTION SUBCUTANEOUS at 06:52

## 2024-05-28 RX ADMIN — ENOXAPARIN SODIUM 40 MILLIGRAM(S): 100 INJECTION SUBCUTANEOUS at 17:03

## 2024-05-28 RX ADMIN — BUDESONIDE AND FORMOTEROL FUMARATE DIHYDRATE 2 PUFF(S): 160; 4.5 AEROSOL RESPIRATORY (INHALATION) at 08:43

## 2024-05-28 NOTE — PATIENT CHOICE NOTE. - NSPTCHOICENOTES_GEN_ALL_CORE
TRANSITION OF CARE PLAN:  Patient is self-directing own DC planning; DC to home; self-care;   Pt. understands she will need to to f/u with MD post DC; patient was furnished [facility] Internist/ PCP per request;  will arrange own to transport at DC; NO DME NEEDS; No skilled needs identified at home; patient verbalized agreement and understanding;

## 2024-05-28 NOTE — CAREGIVER ENGAGEMENT NOTE - CAREGIVER OUTREACH NOTES - FREE TEXT
TRANSITION OF CARE PLAN:  Patient is self-directing own DC planning; DC to home; self-care;   Pt. understands she will need to to f/u with MD post DC; patient was furnished [facility] Internist/ PCP per request;  will arrange own to transport at DC; NO DME NEEDS; No skilled needs identified at home; patient verbalized agreement and understanding;

## 2024-05-28 NOTE — CARE COORDINATION ASSESSMENT. - NSCAREPROVIDERS_GEN_ALL_CORE_FT
CARE PROVIDERS:  Accepting Physician: Sharmaine Olmos  Administration: Iam Urena  Administration: Ryne Elder  Administration: Mike Lafleur  Admitting: Sharmaine Olmos  Attending: Sharmaine Olmos  Case Management: Santaromana, Anna  Consultant: Christopher Fermin  Consultant: Khalida Banda  Consultant: Yaniv Sharpe  Covering Team: Gualberto Harper  ED Attending: Ny Head  ED Nurse: Sandeep Ribeiro  Infection Control: Roxy Grimaldo  Nurse: Briseida Villarreal  Nurse: Vanessa Da Silva  Nurse: Trina Lynch  Nurse: Sandeep Ribeiro  Ordered: Doctor, Unknown  Outpatient Provider: Brett Khalil  PCA/Nursing Assistant: Jane Wu  PCA/Nursing Assistant: Annia Garcia  Primary Team: Cathleen Schmitt  : Nikia Mota  Team: MARKELL  Hospitalists, Team

## 2024-05-28 NOTE — CARE COORDINATION ASSESSMENT. - NSADDITIONAL INFORMATION_FT
RN JAVI met with pt. at bedside, pt. A&O x4; CM role and DC planning process explained; verbalized understanding.   Pt. reports;  independent in ambulation, stairs, ADLs/ iADLs; strong family support at home;   TRANSITION OF CARE PLAN:  Patient is self-directing own DC planning; DC to home; self-care;   Pt. understands she will need to to f/u with MD post DC; patient was furnished [facility] Internist/ PCP per request;  will arrange own to transport at DC; NO DME NEEDS; No skilled needs identified at home; patient verbalized agreement and understanding;

## 2024-05-28 NOTE — CARE COORDINATION ASSESSMENT. - NSPASTMEDSURGHISTORY_GEN_ALL_CORE_FT
PAST MEDICAL & SURGICAL HISTORY:  GERD (Gastroesophageal Reflux Disease)      Benign Essential Hypertension      Bilateral Inguinal Hernia (BIH)      C Section      Asthma      Right-sided carotid artery disease      MVP (mitral valve prolapse)      HTN (hypertension)      DM (diabetes mellitus)      High cholesterol

## 2024-05-29 ENCOUNTER — TRANSCRIPTION ENCOUNTER (OUTPATIENT)
Age: 59
End: 2024-05-29

## 2024-05-29 VITALS
OXYGEN SATURATION: 98 % | HEART RATE: 74 BPM | TEMPERATURE: 98 F | RESPIRATION RATE: 18 BRPM | SYSTOLIC BLOOD PRESSURE: 121 MMHG | DIASTOLIC BLOOD PRESSURE: 76 MMHG

## 2024-05-29 LAB
ANION GAP SERPL CALC-SCNC: 10 MMOL/L — SIGNIFICANT CHANGE UP (ref 5–17)
BUN SERPL-MCNC: 24 MG/DL — HIGH (ref 7–23)
CALCIUM SERPL-MCNC: 9.7 MG/DL — SIGNIFICANT CHANGE UP (ref 8.4–10.5)
CHLORIDE SERPL-SCNC: 107 MMOL/L — SIGNIFICANT CHANGE UP (ref 96–108)
CO2 SERPL-SCNC: 27 MMOL/L — SIGNIFICANT CHANGE UP (ref 22–31)
CREAT SERPL-MCNC: 1.01 MG/DL — SIGNIFICANT CHANGE UP (ref 0.5–1.3)
EGFR: 65 ML/MIN/1.73M2 — SIGNIFICANT CHANGE UP
GLUCOSE BLDC GLUCOMTR-MCNC: 126 MG/DL — HIGH (ref 70–99)
GLUCOSE BLDC GLUCOMTR-MCNC: 143 MG/DL — HIGH (ref 70–99)
GLUCOSE SERPL-MCNC: 125 MG/DL — HIGH (ref 70–99)
HCT VFR BLD CALC: 38 % — SIGNIFICANT CHANGE UP (ref 34.5–45)
HGB BLD-MCNC: 11.2 G/DL — LOW (ref 11.5–15.5)
LEGIONELLA AG UR QL: NEGATIVE — SIGNIFICANT CHANGE UP
MCHC RBC-ENTMCNC: 24.5 PG — LOW (ref 27–34)
MCHC RBC-ENTMCNC: 29.5 GM/DL — LOW (ref 32–36)
MCV RBC AUTO: 83.2 FL — SIGNIFICANT CHANGE UP (ref 80–100)
MRSA PCR RESULT.: SIGNIFICANT CHANGE UP
NRBC # BLD: 0 /100 WBCS — SIGNIFICANT CHANGE UP (ref 0–0)
PLATELET # BLD AUTO: 276 K/UL — SIGNIFICANT CHANGE UP (ref 150–400)
POTASSIUM SERPL-MCNC: 3.8 MMOL/L — SIGNIFICANT CHANGE UP (ref 3.5–5.3)
POTASSIUM SERPL-SCNC: 3.8 MMOL/L — SIGNIFICANT CHANGE UP (ref 3.5–5.3)
RBC # BLD: 4.57 M/UL — SIGNIFICANT CHANGE UP (ref 3.8–5.2)
RBC # FLD: 16.4 % — HIGH (ref 10.3–14.5)
S AUREUS DNA NOSE QL NAA+PROBE: SIGNIFICANT CHANGE UP
S PNEUM AG UR QL: NEGATIVE — SIGNIFICANT CHANGE UP
SODIUM SERPL-SCNC: 144 MMOL/L — SIGNIFICANT CHANGE UP (ref 135–145)
WBC # BLD: 7.34 K/UL — SIGNIFICANT CHANGE UP (ref 3.8–10.5)
WBC # FLD AUTO: 7.34 K/UL — SIGNIFICANT CHANGE UP (ref 3.8–10.5)

## 2024-05-29 PROCEDURE — 36415 COLL VENOUS BLD VENIPUNCTURE: CPT

## 2024-05-29 PROCEDURE — 83036 HEMOGLOBIN GLYCOSYLATED A1C: CPT

## 2024-05-29 PROCEDURE — 96375 TX/PRO/DX INJ NEW DRUG ADDON: CPT

## 2024-05-29 PROCEDURE — 83880 ASSAY OF NATRIURETIC PEPTIDE: CPT

## 2024-05-29 PROCEDURE — 96365 THER/PROPH/DIAG IV INF INIT: CPT

## 2024-05-29 PROCEDURE — 80048 BASIC METABOLIC PNL TOTAL CA: CPT

## 2024-05-29 PROCEDURE — 82962 GLUCOSE BLOOD TEST: CPT

## 2024-05-29 PROCEDURE — 94640 AIRWAY INHALATION TREATMENT: CPT

## 2024-05-29 PROCEDURE — 87040 BLOOD CULTURE FOR BACTERIA: CPT

## 2024-05-29 PROCEDURE — 87641 MR-STAPH DNA AMP PROBE: CPT

## 2024-05-29 PROCEDURE — 99239 HOSP IP/OBS DSCHRG MGMT >30: CPT

## 2024-05-29 PROCEDURE — 87086 URINE CULTURE/COLONY COUNT: CPT

## 2024-05-29 PROCEDURE — 84145 PROCALCITONIN (PCT): CPT

## 2024-05-29 PROCEDURE — 87449 NOS EACH ORGANISM AG IA: CPT

## 2024-05-29 PROCEDURE — 85027 COMPLETE CBC AUTOMATED: CPT

## 2024-05-29 PROCEDURE — 85025 COMPLETE CBC W/AUTO DIFF WBC: CPT

## 2024-05-29 PROCEDURE — 93005 ELECTROCARDIOGRAM TRACING: CPT

## 2024-05-29 PROCEDURE — 0225U NFCT DS DNA&RNA 21 SARSCOV2: CPT

## 2024-05-29 PROCEDURE — 71045 X-RAY EXAM CHEST 1 VIEW: CPT

## 2024-05-29 PROCEDURE — 87899 AGENT NOS ASSAY W/OPTIC: CPT

## 2024-05-29 PROCEDURE — 96367 TX/PROPH/DG ADDL SEQ IV INF: CPT

## 2024-05-29 PROCEDURE — 87070 CULTURE OTHR SPECIMN AEROBIC: CPT

## 2024-05-29 PROCEDURE — 81001 URINALYSIS AUTO W/SCOPE: CPT

## 2024-05-29 PROCEDURE — 80053 COMPREHEN METABOLIC PANEL: CPT

## 2024-05-29 PROCEDURE — 86140 C-REACTIVE PROTEIN: CPT

## 2024-05-29 PROCEDURE — 87640 STAPH A DNA AMP PROBE: CPT

## 2024-05-29 PROCEDURE — 83605 ASSAY OF LACTIC ACID: CPT

## 2024-05-29 PROCEDURE — 71250 CT THORAX DX C-: CPT | Mod: MC

## 2024-05-29 PROCEDURE — 82803 BLOOD GASES ANY COMBINATION: CPT

## 2024-05-29 PROCEDURE — 99285 EMERGENCY DEPT VISIT HI MDM: CPT

## 2024-05-29 RX ORDER — FERROUS FUMARATE 350(115)MG
1 TABLET ORAL
Refills: 0 | DISCHARGE

## 2024-05-29 RX ORDER — HYDROCHLOROTHIAZIDE 25 MG
1 TABLET ORAL
Refills: 0 | DISCHARGE

## 2024-05-29 RX ORDER — FLUTICASONE FUROATE, UMECLIDINIUM BROMIDE AND VILANTEROL TRIFENATATE 200; 62.5; 25 UG/1; UG/1; UG/1
2 POWDER RESPIRATORY (INHALATION)
Refills: 0 | DISCHARGE

## 2024-05-29 RX ORDER — FLUTICASONE PROPIONATE AND SALMETEROL 50; 250 UG/1; UG/1
1 POWDER ORAL; RESPIRATORY (INHALATION)
Qty: 1 | Refills: 0
Start: 2024-05-29 | End: 2024-06-27

## 2024-05-29 RX ORDER — CEFPODOXIME PROXETIL 100 MG
1 TABLET ORAL
Qty: 10 | Refills: 0
Start: 2024-05-29 | End: 2024-06-02

## 2024-05-29 RX ORDER — MECLIZINE HCL 12.5 MG
1 TABLET ORAL
Refills: 0 | DISCHARGE

## 2024-05-29 RX ADMIN — ENOXAPARIN SODIUM 40 MILLIGRAM(S): 100 INJECTION SUBCUTANEOUS at 05:14

## 2024-05-29 RX ADMIN — Medication 81 MILLIGRAM(S): at 11:11

## 2024-05-29 RX ADMIN — CEFTRIAXONE 100 MILLIGRAM(S): 500 INJECTION, POWDER, FOR SOLUTION INTRAMUSCULAR; INTRAVENOUS at 01:27

## 2024-05-29 RX ADMIN — Medication 20 MILLIGRAM(S): at 05:15

## 2024-05-29 RX ADMIN — AZITHROMYCIN 255 MILLIGRAM(S): 500 TABLET, FILM COATED ORAL at 01:55

## 2024-05-29 RX ADMIN — BUDESONIDE AND FORMOTEROL FUMARATE DIHYDRATE 2 PUFF(S): 160; 4.5 AEROSOL RESPIRATORY (INHALATION) at 06:31

## 2024-05-29 NOTE — PROGRESS NOTE ADULT - REASON FOR ADMISSION
Viral PNA with possible superimposed bacterial PNA

## 2024-05-29 NOTE — CASE MANAGEMENT PROGRESS NOTE - NSCMPROGRESSNOTE_GEN_ALL_CORE
Per treatment team rounds  Pt. for home today with no skilled home care needs.  58F HTN, HLD, DM2, and Asthma admitted for B/L Pneumonia and Acute Asthma Exacerbation  B/L PNA Related to underlying Para-Influenza infection and B/L Lower Lobe PNA confirmed on CT imaging  s/p  Ceftriaxone + Azithromycin  will go home with po Antibiotics + Prednisone + Symbicort + Albuterol.      CM met with pt. and family at bedside.  Pt. agree to speak with spouse and dtr present.  Pt. states she works and drives and is independent and will follow the treatment plan given by MD and make her own follow up appointments states [place] and is looking to change doctors Provided her the nuber for Connect to [facility] doctors [phone] and some local pulmonary MD's also MARCELA DILLARD provided her with list of PCP earlier this week.  Spouse Dustin had his laptop open and CM suggested they go on [employer]  website to locate doctors in area who accept insurance as well.   CM came back latter and they said they called some of the doctors and they take pt. insurance , but they will make an appointment  tomorrow once home.  CM offered to help with callong and they declined.   Pt. declined needing any home care services.  CM provided Discharge notice and informed right to Appeal and pt. signed form and was given a copy.  CM available.   .

## 2024-05-29 NOTE — PROGRESS NOTE ADULT - ASSESSMENT
57 yo F PMH Asthma, HTN, DM2, HLD who presents with 6 days of cough with productive sputum, sore throat, and congestion along with subjective low grade fever of 100.7. Imaging and labs suggestive of viral PNA with possible superimposed bacterial PNA.    #sepsis  #Viral PNA (parainfluenza)  #possible superimposed bacterial PNA  sepsis criteria met with fever, tachycardia, identifed source, symptoms improved with fluids and treatment   CT showing bibasilar PNA  Parainfluenza, with acute hyperactive airway disease  start on solumderol   albuterol rn  C/w ceftriaxone and azithromycin  antitussive and lozenges  Check legionella and strep  MRSA PCR  f/u Bcx and Ucx      #DM2  A1c ordered   ISS and POC glucose for now    #UTI  Ua mildly positive with sxs of dysuria   c/w ceftriaxone   F/u Ucx    #HTN  #HLD  BP currently controlled  hold home losartan and HCTZ as BP is currently with systolic 114, restart when appropriate  C/w propanolol, ASA, statin    DVT ppx; lovenox  Diet: diabetic/DASH
57 yo F PMH Asthma, HTN, DM2, HLD who presents with 6 days of cough with productive sputum (green in color), sore throat, and congestion along with subjective low grade fever of 100.7    resp viral illness  parainfluenza  pna  HLD  HTN  OP  OA  DM    cough - occ wheeze - nebs - steroids - abx -   vs noted    resp isol precs  ct chest c/w PNA  ID eval  on symbicort - steroids - nebs -   monitor VS and Sat  ANIVAL eval as outpatient  DM care  serial FS - on Systemic Steroids  cvs rx regimen and BP control - HTN HLD  dietary discretion  goal sat > 88 pct  cough rx regimen  ACAP prn  
58F HTN, HLD, DM2, and Asthma admitted for B/L Pneumonia and Acute Asthma Exacerbation     Acute Asthma Exacerbation / B/L PNA  Related to underlying Para-Influenza infection and B/L Lower Lobe PNA confirmed on CT imaging  On room air but labored breathing at times   Ceftriaxone + Azithromycin + Prednisone + Symbicort + Albuterol   Pulmonary Consult noted     HTN / HLD  BP slight elevated   On Propranolol + Statin  Resume Losartan   Hold HCTZ    Diabetes Mellitus 2   Hold Oral Meds and Expect BS to be elevated while on steroids  A1C was 5.5   ISS for coverage; Maintain BS < 180    Diet  Regular    DVT Prophylaxis  Lovenox    Disposition   Discharge most likely tomorrow  
59 yo F PMH Asthma, HTN, DM2, HLD who presents with 6 days of cough with productive sputum (green in color), sore throat, and congestion along with subjective low grade fever of 100.7    resp viral illness  parainfluenza  pna  HLD  HTN  OP  OA  DM    resp isol precs  ct chest c/w PNA  ID eval  on spiriva - symbicort - steroids - nebs -   monitor VS and Sat  ANIVAL eval as outpatient  DM care  serial FS - on Systemic Steroids  cvs rx regimen and BP control - HTN HLD  dietary discretion  goal sat > 88 pct  cough rx regimen  ACAP prn

## 2024-05-29 NOTE — DIETITIAN INITIAL EVALUATION ADULT - OTHER INFO
Patient seen for policy BMI> 40. She confirms allergies to shellfish. Denies difficulty chewing/swallowing, without complaints of GI distress, last BM earlier today. Endorses good appetite and good PO intake since admission, as well as PTA. Was taking Metformin and more recently Trulicity, which she reports was more for weight management and prediabetes. Was not taking blood glucose levels at home, current A1C level is 5.7%, upon admission A1c was 5.5%. Patient and spouse are receptive to diet education regarding weight loss and prediabetes, as they know she should be making healthier lifestyle changes upon discharge. Limited physical activity.

## 2024-05-29 NOTE — PROGRESS NOTE ADULT - SUBJECTIVE AND OBJECTIVE BOX
ANNA LEMUS is a 58yFemale , patient examined and chart reviewed.    INTERVAL HPI/ OVERNIGHT EVENTS:   Feeling better today.  Afebrile.    PAST MEDICAL & SURGICAL HISTORY:  Benign Essential Hypertension  GERD (Gastroesophageal Reflux Disease)  Asthma  Right-sided carotid artery disease  MVP (mitral valve prolapse)  HTN (hypertension)  DM (diabetes mellitus)  High cholesterol  C Section  Bilateral Inguinal Hernia (BIH)    For details regarding the patient's social history, family history, and other miscellaneous elements, please refer the initial infectious diseases consultation and/or the admitting history and physical examination for this admission.    ROS:  CONSTITUTIONAL:  Negative fever or chills  EYES:  Negative  blurry vision or double vision  CARDIOVASCULAR:  Negative for chest pain or palpitations  RESPIRATORY:  Negative for cough, wheezing, or SOB   GASTROINTESTINAL:  Negative for nausea, vomiting, diarrhea, constipation, or abdominal pain  GENITOURINARY:  Negative frequency, urgency or dysuria  NEUROLOGIC:  No headache, confusion, dizziness, lightheadedness  All other systems were reviewed and are negative     No Known Drug Allergies  shellfish (Hives)  seasonal (Sneezing)  shellfish (Unknown)      Current inpatient medications :    ANTIBIOTICS/RELEVANT:  azithromycin  IVPB 500 milliGRAM(s) IV Intermittent every 24 hours  cefTRIAXone   IVPB 1000 milliGRAM(s) IV Intermittent every 24 hours      acetaminophen     Tablet .. 650 milliGRAM(s) Oral every 6 hours PRN  albuterol    90 MICROgram(s) HFA Inhaler 2 Puff(s) Inhalation every 6 hours PRN  albuterol    90 MICROgram(s) HFA Inhaler 1 Puff(s) Inhalation every 6 hours PRN  albuterol/ipratropium for Nebulization 3 milliLiter(s) Nebulizer every 6 hours PRN  aluminum hydroxide/magnesium hydroxide/simethicone Suspension 30 milliLiter(s) Oral every 4 hours PRN  aspirin enteric coated 81 milliGRAM(s) Oral daily  atorvastatin 20 milliGRAM(s) Oral at bedtime  benzocaine/menthol Lozenge 1 Lozenge Oral every 3 hours PRN  budesonide  80 MICROgram(s)/formoterol 4.5 MICROgram(s) Inhaler 2 Puff(s) Inhalation two times a day  dextrose 10% Bolus 125 milliLiter(s) IV Bolus once  dextrose 5%. 1000 milliLiter(s) IV Continuous <Continuous>  dextrose 5%. 1000 milliLiter(s) IV Continuous <Continuous>  dextrose 50% Injectable 25 Gram(s) IV Push once  dextrose 50% Injectable 12.5 Gram(s) IV Push once  dextrose Oral Gel 15 Gram(s) Oral once PRN  enoxaparin Injectable 40 milliGRAM(s) SubCutaneous every 12 hours  glucagon  Injectable 1 milliGRAM(s) IntraMuscular once  guaiFENesin Oral Liquid (Sugar-Free) 200 milliGRAM(s) Oral every 6 hours PRN  insulin lispro (ADMELOG) corrective regimen sliding scale   SubCutaneous three times a day before meals  insulin lispro (ADMELOG) corrective regimen sliding scale   SubCutaneous at bedtime  meclizine 25 milliGRAM(s) Oral daily PRN  melatonin 3 milliGRAM(s) Oral at bedtime PRN  ondansetron Injectable 4 milliGRAM(s) IV Push every 8 hours PRN  predniSONE   Tablet 20 milliGRAM(s) Oral daily  propranolol 40 milliGRAM(s) Oral daily      Objective:    T(C): 36.7 (05-28-24 @ 10:26), Max: 36.9 (05-28-24 @ 05:49)  HR: 86 (05-28-24 @ 10:26) (86 - 96)  BP: 153/89 (05-28-24 @ 10:26) (125/65 - 153/89)  RR: 18 (05-28-24 @ 10:26) (16 - 18)  SpO2: 95% (05-28-24 @ 10:26) (95% - 96%)      Physical Exam:  General: Obese no acute distress  Neck: supple, trachea midline  Lungs: decreased, no wheeze/rhonchi  Cardiovascular: regular rate and rhythm, S1 S2  Abdomen: soft, nontender,  bowel sounds normal  Neurological: alert and oriented x3  Skin: no rash  Extremities: no edema        LABS:                          10.8   3.38  )-----------( 142      ( 27 May 2024 05:30 )             35.3       05-27    139  |  105  |  11  ----------------------------<  195<H>  3.2<L>   |  27  |  0.93    Ca    9.1      27 May 2024 05:30    TPro  6.6  /  Alb  2.9<L>  /  TBili  0.3  /  DBili  x   /  AST  24  /  ALT  38  /  AlkPhos  86  05-27      MICROBIOLOGY:    Culture - Urine (collected 27 May 2024 01:38)  Source: Clean Catch Clean Catch (Midstream)  Final Report (28 May 2024 09:30):    <10,000 CFU/mL Normal Urogenital Sophia    Respiratory Viral Panel with COVID-19 by FEDERICO (05.26.24 @ 23:53)    Rapid RVP Result: Detected   SARS-CoV-2: NotDete: This Respiratory Panel uses polymerase chain reaction (PCR) to detect for  adenovirus; coronavirus (HKU1, NL63, 229E, OC43); human metapneumovirus  (hMPV); human enterovirus/rhinovirus (Entero/RV); influenza A; influenza  A/H1; influenza A/H3; influenza A/H1-2009; influenza B; parainfluenza  viruses 1, 2, 3, 4; respiratory syncytial virus; Mycoplasma pneumoniae;  Chlamydophila pneumoniae; and SARS-CoV-2.   Parainfluenza 3 (RapRVP): Detected      RADIOLOGY & ADDITIONAL STUDIES:    ACC: [id number] EXAM:  CT CHEST   ORDERED BY: ANDREI HERNANDEZ     PROCEDURE DATE:  05/27/2024          INTERPRETATION:  CLINICAL INFORMATION: Fever, cough, evaluate for   pneumonia    COMPARISON: CTA chest performed 9/9/2020.    CONTRAST/COMPLICATIONS:  IV Contrast: NONE  Oral Contrast: NONE  Complications: None reported at time of study completion    PROCEDURE:  CT of the Chest was performed.  Sagittal and coronal reformats were performed.    FINDINGS:    LUNGS AND AIRWAYS: Patent central airways.  Nodular airspace opacities in   the superior segment of the right lower lobe and left lower lobe most   consistent with acute infection.  PLEURA: No pleural effusion.  MEDIASTINUM AND PEPITO: Multiple mildly prominent mediastinal lymph nodes,   most likely reactive.  VESSELS: Aorta not aneurysmal. Limited evaluation due to lack of IV   contrast.  HEART: Heart size is normal. No pericardial effusion.  CHEST WALL AND LOWER NECK: Within normal limits.  VISUALIZED UPPER ABDOMEN: Hepatomegaly andhepatic steatosis.   Cholecystectomy. Partially visualized sequela from prior abdominal wall   hernia repair.  BONES: DISH in the thoracic spine.    IMPRESSION:  Findings most consistent with bilateral lower lobe pneumonia.      Assessment :   57YO F PMH Asthma, HTN, DM2, HLD who presented to the hospital with c/o 1 week history of  cough with productive sputum (green in color), sore throat, and congestion along with subjective low grade fever of 100.7. Was seen at urgent care day PTA  and was given cefpodoxime and antitussive but with little improvement. No n/v/d CP abd pain. In ED Tmax 100.7 WBC wnl. RVP with parainfluenza CT chest with mariana pneumonia.  Viral bronchitis with asthma exacerbation   Poss superimposed bacterial pna  Clinically better    Plan  Cont Rocephin Zithromax  Can change to po Vantin till 6/2 on dc  Fu cultures  Trend temps and cbc  Steroids nebs per pulm  Pulm toileting  Stable from ID standpoint    D/w Dr Sweeney      Continue with present regiment.  Appropriate use of antibiotics and adverse effects reviewed.      I have discussed the above plan of care with patient/ family in detail. They expressed understanding of the  treatment plan . Risks, benefits and alternatives discussed in detail. I have asked if they have any questions or concerns and appropriately addressed them to the best of my ability .    > 35 minutes were spent in direct patient care reviewing notes, medications ,labs data/ imaging , discussion with multidisciplinary team.    Thank you for allowing me to participate in care of your patient .    Khalida Banda MD  Infectious Disease  [phone]
     ANNA LEMUS is a 58yFemale , patient examined and chart reviewed.    INTERVAL HPI/ OVERNIGHT EVENTS:   Feeling better. No events.  Afebrile.    PAST MEDICAL & SURGICAL HISTORY:  Benign Essential Hypertension  GERD (Gastroesophageal Reflux Disease)  Asthma  Right-sided carotid artery disease  MVP (mitral valve prolapse)  HTN (hypertension)  DM (diabetes mellitus)  High cholesterol  C Section  Bilateral Inguinal Hernia (BIH)    For details regarding the patient's social history, family history, and other miscellaneous elements, please refer the initial infectious diseases consultation and/or the admitting history and physical examination for this admission.    ROS:  CONSTITUTIONAL:  Negative fever or chills  EYES:  Negative  blurry vision or double vision  CARDIOVASCULAR:  Negative for chest pain or palpitations  RESPIRATORY:  Negative for cough, wheezing, or SOB   GASTROINTESTINAL:  Negative for nausea, vomiting, diarrhea, constipation, or abdominal pain  GENITOURINARY:  Negative frequency, urgency or dysuria  NEUROLOGIC:  No headache, confusion, dizziness, lightheadedness  All other systems were reviewed and are negative     No Known Drug Allergies  shellfish (Hives)  seasonal (Sneezing)  shellfish (Unknown)      Current inpatient medications :    ANTIBIOTICS/RELEVANT:  azithromycin  IVPB 500 milliGRAM(s) IV Intermittent every 24 hours  cefTRIAXone   IVPB 1000 milliGRAM(s) IV Intermittent every 24 hours      MEDICATIONS  (STANDING):  aspirin enteric coated 81 milliGRAM(s) Oral daily  atorvastatin 20 milliGRAM(s) Oral at bedtime  budesonide  80 MICROgram(s)/formoterol 4.5 MICROgram(s) Inhaler 2 Puff(s) Inhalation two times a day  dextrose 10% Bolus 125 milliLiter(s) IV Bolus once  dextrose 5%. 1000 milliLiter(s) (50 mL/Hr) IV Continuous <Continuous>  dextrose 5%. 1000 milliLiter(s) (100 mL/Hr) IV Continuous <Continuous>  dextrose 50% Injectable 25 Gram(s) IV Push once  dextrose 50% Injectable 12.5 Gram(s) IV Push once  enoxaparin Injectable 40 milliGRAM(s) SubCutaneous every 12 hours  glucagon  Injectable 1 milliGRAM(s) IntraMuscular once  insulin lispro (ADMELOG) corrective regimen sliding scale   SubCutaneous three times a day before meals  insulin lispro (ADMELOG) corrective regimen sliding scale   SubCutaneous at bedtime  predniSONE   Tablet 20 milliGRAM(s) Oral daily  propranolol 40 milliGRAM(s) Oral daily    MEDICATIONS  (PRN):  acetaminophen     Tablet .. 650 milliGRAM(s) Oral every 6 hours PRN Temp greater or equal to 38C (100.4F), Mild Pain (1 - 3)  albuterol    90 MICROgram(s) HFA Inhaler 2 Puff(s) Inhalation every 6 hours PRN for shortness of breath and/or wheezing  albuterol    90 MICROgram(s) HFA Inhaler 1 Puff(s) Inhalation every 6 hours PRN Shortness of Breath and/or Wheezing  albuterol/ipratropium for Nebulization 3 milliLiter(s) Nebulizer every 6 hours PRN Shortness of Breath and/or Wheezing  aluminum hydroxide/magnesium hydroxide/simethicone Suspension 30 milliLiter(s) Oral every 4 hours PRN Dyspepsia  benzocaine/menthol Lozenge 1 Lozenge Oral every 3 hours PRN Sore Throat  dextrose Oral Gel 15 Gram(s) Oral once PRN Blood Glucose LESS THAN 70 milliGRAM(s)/deciliter  guaiFENesin Oral Liquid (Sugar-Free) 200 milliGRAM(s) Oral every 6 hours PRN Cough  meclizine 25 milliGRAM(s) Oral daily PRN for dizziness  melatonin 3 milliGRAM(s) Oral at bedtime PRN Insomnia  ondansetron Injectable 4 milliGRAM(s) IV Push every 8 hours PRN Nausea and/or Vomiting      Objective:  Vital Signs Last 24 Hrs  T(C): 36.8 (29 May 2024 13:40), Max: 36.9 (28 May 2024 21:38)  T(F): 98.2 (29 May 2024 13:40), Max: 98.5 (28 May 2024 21:38)  HR: 75 (29 May 2024 13:40) (73 - 83)  BP: 145/77 (29 May 2024 13:40) (110/73 - 145/77)  RR: 17 (29 May 2024 13:40) (17 - 18)  SpO2: 97% (29 May 2024 13:40) (95% - 98%)    Parameters below as of 29 May 2024 05:10  Patient On (Oxygen Delivery Method): room air      Physical Exam:  General: Obese no acute distress  Neck: supple, trachea midline  Lungs: decreased, no wheeze/rhonchi  Cardiovascular: regular rate and rhythm, S1 S2  Abdomen: soft, nontender,  bowel sounds normal  Neurological: alert and oriented x3  Skin: no rash  Extremities: no edema        LABS:                          11.2   7.34  )-----------( 276      ( 29 May 2024 06:30 )             38.0   05-29    144  |  107  |  24<H>  ----------------------------<  125<H>  3.8   |  27  |  1.01    Ca    9.7      29 May 2024 06:30      MICROBIOLOGY:    Culture - Urine (collected 27 May 2024 01:38)  Source: Clean Catch Clean Catch (Midstream)  Final Report (28 May 2024 09:30):    <10,000 CFU/mL Normal Urogenital Sophia    Culture - Blood (collected 26 May 2024 23:53)  Source: .Blood Blood-Peripheral  Preliminary Report (28 May 2024 15:02):    No growth at 24 hours    Culture - Blood (collected 26 May 2024 23:53)  Source: .Blood Blood-Peripheral  Preliminary Report (28 May 2024 15:02):    No growth at 24 hours    Respiratory Viral Panel with COVID-19 by [facility]ER[facility] (05.26.24 @ 23:53)    Rapid RVP Result: Detected   SARS-CoV-2: NotDete: This Respiratory Panel uses polymerase chain reaction (PCR) to detect for  adenovirus; coronavirus (HKU1, NL63, 229E, OC43); human metapneumovirus  (hMPV); human enterovirus/rhinovirus (Entero/RV); influenza A; influenza  A/H1; influenza A/H3; influenza A/H1-2009; influenza B; parainfluenza  viruses 1, 2, 3, 4; respiratory syncytial virus; Mycoplasma pneumoniae;  Chlamydophila pneumoniae; and SARS-CoV-2.   Parainfluenza 3 (RapRVP): Detected      RADIOLOGY & ADDITIONAL STUDIES:    ACC: [id number] EXAM:  CT CHEST   ORDERED BY: ANDREI HERNANDEZ     PROCEDURE DATE:  05/27/2024          INTERPRETATION:  CLINICAL INFORMATION: Fever, cough, evaluate for   pneumonia    COMPARISON: CTA chest performed 9/9/2020.    CONTRAST/COMPLICATIONS:  IV Contrast: NONE  Oral Contrast: NONE  Complications: None reported at time of study completion    PROCEDURE:  CT of the Chest was performed.  Sagittal and coronal reformats were performed.    FINDINGS:    LUNGS AND AIRWAYS: Patent central airways.  Nodular airspace opacities in   the superior segment of the right lower lobe and left lower lobe most   consistent with acute infection.  PLEURA: No pleural effusion.  MEDIASTINUM AND PEPITO: Multiple mildly prominent mediastinal lymph nodes,   most likely reactive.  VESSELS: Aorta not aneurysmal. Limited evaluation due to lack of IV   contrast.  HEART: Heart size is normal. No pericardial effusion.  CHEST WALL AND LOWER NECK: Within normal limits.  VISUALIZED UPPER ABDOMEN: Hepatomegaly andhepatic steatosis.   Cholecystectomy. Partially visualized sequela from prior abdominal wall   hernia repair.  BONES: DISH in the thoracic spine.    IMPRESSION:  Findings most consistent with bilateral lower lobe pneumonia.      Assessment :   57YO F PMH Asthma, HTN, DM2, HLD who presented to the hospital with c/o 1 week history of  cough with productive sputum (green in color), sore throat, and congestion along with subjective low grade fever of 100.7. Was seen at urgent care day PTA  and was given cefpodoxime and antitussive but with little improvement. No n/v/d CP abd pain. In ED Tmax 100.7 WBC wnl. RVP with parainfluenza CT chest with mariana pneumonia.  Viral bronchitis with asthma exacerbation   Poss superimposed bacterial pna  Clinically better    Plan  On Rocephin Zithromax  Can change to po Vantin till 6/2 on dc  Trend temps and cbc  Steroids nebs per pulm  Pulm toileting  Stable from ID standpoint  Dc home today    D/w Dr Sweeney      Continue with present regiment.  Appropriate use of antibiotics and adverse effects reviewed.      I have discussed the above plan of care with patient/ family in detail. They expressed understanding of the  treatment plan . Risks, benefits and alternatives discussed in detail. I have asked if they have any questions or concerns and appropriately addressed them to the best of my ability .    > 35 minutes were spent in direct patient care reviewing notes, medications ,labs data/ imaging , discussion with multidisciplinary team.    Thank you for allowing me to participate in care of your patient .    Khalida Banda MD  Infectious Disease  [phone]
Date/Time Patient Seen:  		  Referring MD:   Data Reviewed	       Patient is a 58y old  Female who presents with a chief complaint of Viral PNA with possible superimposed bacterial PNA (27 May 2024 17:43)      Subjective/HPI     PAST MEDICAL & SURGICAL HISTORY:  Benign Essential Hypertension    GERD (Gastroesophageal Reflux Disease)    Asthma    Right-sided carotid artery disease    MVP (mitral valve prolapse)    HTN (hypertension)    DM (diabetes mellitus)    High cholesterol    C Section    Bilateral Inguinal Hernia (BIH)          Medication list         MEDICATIONS  (STANDING):  aspirin enteric coated 81 milliGRAM(s) Oral daily  atorvastatin 20 milliGRAM(s) Oral at bedtime  azithromycin  IVPB 500 milliGRAM(s) IV Intermittent every 24 hours  budesonide  80 MICROgram(s)/formoterol 4.5 MICROgram(s) Inhaler 2 Puff(s) Inhalation two times a day  cefTRIAXone   IVPB 1000 milliGRAM(s) IV Intermittent every 24 hours  dextrose 10% Bolus 125 milliLiter(s) IV Bolus once  dextrose 5%. 1000 milliLiter(s) (50 mL/Hr) IV Continuous <Continuous>  dextrose 5%. 1000 milliLiter(s) (100 mL/Hr) IV Continuous <Continuous>  dextrose 50% Injectable 25 Gram(s) IV Push once  dextrose 50% Injectable 12.5 Gram(s) IV Push once  enoxaparin Injectable 40 milliGRAM(s) SubCutaneous every 12 hours  glucagon  Injectable 1 milliGRAM(s) IntraMuscular once  insulin lispro (ADMELOG) corrective regimen sliding scale   SubCutaneous three times a day before meals  insulin lispro (ADMELOG) corrective regimen sliding scale   SubCutaneous at bedtime  methylPREDNISolone sodium succinate Injectable 40 milliGRAM(s) IV Push three times a day  propranolol 40 milliGRAM(s) Oral daily  tiotropium 2.5 MICROgram(s) Inhaler 2 Puff(s) Inhalation daily    MEDICATIONS  (PRN):  acetaminophen     Tablet .. 650 milliGRAM(s) Oral every 6 hours PRN Temp greater or equal to 38C (100.4F), Mild Pain (1 - 3)  albuterol    90 MICROgram(s) HFA Inhaler 1 Puff(s) Inhalation every 6 hours PRN Shortness of Breath and/or Wheezing  albuterol    90 MICROgram(s) HFA Inhaler 2 Puff(s) Inhalation every 6 hours PRN for shortness of breath and/or wheezing  albuterol/ipratropium for Nebulization 3 milliLiter(s) Nebulizer every 6 hours PRN Shortness of Breath and/or Wheezing  aluminum hydroxide/magnesium hydroxide/simethicone Suspension 30 milliLiter(s) Oral every 4 hours PRN Dyspepsia  benzocaine/menthol Lozenge 1 Lozenge Oral every 3 hours PRN Sore Throat  dextrose Oral Gel 15 Gram(s) Oral once PRN Blood Glucose LESS THAN 70 milliGRAM(s)/deciliter  guaiFENesin Oral Liquid (Sugar-Free) 200 milliGRAM(s) Oral every 6 hours PRN Cough  meclizine 25 milliGRAM(s) Oral daily PRN for dizziness  melatonin 3 milliGRAM(s) Oral at bedtime PRN Insomnia  ondansetron Injectable 4 milliGRAM(s) IV Push every 8 hours PRN Nausea and/or Vomiting         Vitals log        ICU Vital Signs Last 24 Hrs  T(C): 36.8 (27 May 2024 23:56), Max: 36.8 (27 May 2024 23:56)  T(F): 98.3 (27 May 2024 23:56), Max: 98.3 (27 May 2024 23:56)  HR: 86 (27 May 2024 23:56) (86 - 98)  BP: 138/70 (27 May 2024 23:56) (112/73 - 138/70)  BP(mean): --  ABP: --  ABP(mean): --  RR: 16 (27 May 2024 23:56) (16 - 19)  SpO2: 96% (27 May 2024 23:56) (96% - 97%)    O2 Parameters below as of 27 May 2024 09:46  Patient On (Oxygen Delivery Method): room air                 Input and Output:  I&O's Detail      Lab Data                        10.8   3.38  )-----------( 142      ( 27 May 2024 05:30 )             35.3     05-27    139  |  105  |  11  ----------------------------<  195<H>  3.2<L>   |  27  |  0.93    Ca    9.1      27 May 2024 05:30    TPro  6.6  /  Alb  2.9<L>  /  TBili  0.3  /  DBili  x   /  AST  24  /  ALT  38  /  AlkPhos  86  05-27            Review of Systems	      Objective     Physical Examination    heart s1s2  lung dc BS  head nc  head at      Pertinent Lab findings & Imaging      Abdul:  NO   Adequate UO     I&O's Detail           Discussed with:     Cultures:	        Radiology                            
Patient is a 58y old  Female who presents with a chief complaint of Viral PNA with possible superimposed bacterial PNA (27 May 2024 04:44)        HPI:  57 yo F PMH Asthma, HTN, DM2, HLD who presents with 6 days of cough with productive sputum (green in color), sore throat, and congestion along with subjective low grade fever of 100.7. Pt state that she went to urgent care yesterday and was given cefpodoxime and antitussive but with little improvement. Pt noticed that he asthma was flaring as a result of the illness and was using her rescue inhaler and nebulizer which she states did help with her symptoms. Endorses generalized malaise, no nausea, no vomiting, no chest pain, no palpitations, no edema, no pleuritic chest pain, no recent weight gain. Pt endorses that her son came back from [place] with some URI sxs.  Pt also endorses dysuria.     No ETOH, illicit drugs or smoking. Ambulates without hte use of walker or cane.  (27 May 2024 04:44)      SUBJECTIVE & OBJECTIVE: Pt seen and examined at bedside. wheezin/dyspnea+     PHYSICAL EXAM:  T(C): 36.7 (05-27-24 @ 09:46), Max: 38.2 (05-26-24 @ 23:14)  HR: 95 (05-27-24 @ 09:46) (95 - 126)  BP: 126/79 (05-27-24 @ 09:46) (112/73 - 126/79)  RR: 18 (05-27-24 @ 09:46) (18 - 24)  SpO2: 97% (05-27-24 @ 09:46) (95% - 98%)  Wt(kg): -- Height (cm): 162.6 (05-26 @ 23:14)  Weight (kg): 123.4 (05-26 @ 23:14)  BMI (kg/m2): 46.7 (05-26 @ 23:14)  BSA (m2): 2.23 (05-26 @ 23:14)  GENERAL: NAD, well-groomed, well-developed  HEAD:  Atraumatic, Normocephalic  EYES: EOMI, PERRLA, conjunctiva and sclera clear  ENMT: Moist mucous membranes  NECK: Supple, No JVD  NERVOUS SYSTEM:  Alert & Oriented X3, Motor Strength 5/5 B/L upper and lower extremities; DTRs 2+ intact and symmetric  CHEST/LUNG:wheezing and ronchi    HEART: Regular rate and rhythm; No murmurs, rubs, or gallops  ABDOMEN: Soft, Nontender, Nondistended; Bowel sounds present  EXTREMITIES:  2+ Peripheral Pulses, No clubbing, cyanosis, or edema        MEDICATIONS  (STANDING):  aspirin enteric coated 81 milliGRAM(s) Oral daily  atorvastatin 20 milliGRAM(s) Oral at bedtime  azithromycin  IVPB 500 milliGRAM(s) IV Intermittent every 24 hours  budesonide  80 MICROgram(s)/formoterol 4.5 MICROgram(s) Inhaler 2 Puff(s) Inhalation two times a day  cefTRIAXone   IVPB 1000 milliGRAM(s) IV Intermittent every 24 hours  dextrose 10% Bolus 125 milliLiter(s) IV Bolus once  dextrose 5%. 1000 milliLiter(s) (100 mL/Hr) IV Continuous <Continuous>  dextrose 5%. 1000 milliLiter(s) (50 mL/Hr) IV Continuous <Continuous>  dextrose 50% Injectable 25 Gram(s) IV Push once  dextrose 50% Injectable 12.5 Gram(s) IV Push once  enoxaparin Injectable 40 milliGRAM(s) SubCutaneous every 12 hours  glucagon  Injectable 1 milliGRAM(s) IntraMuscular once  insulin lispro (ADMELOG) corrective regimen sliding scale   SubCutaneous three times a day before meals  insulin lispro (ADMELOG) corrective regimen sliding scale   SubCutaneous at bedtime  methylPREDNISolone sodium succinate Injectable 40 milliGRAM(s) IV Push three times a day  propranolol 40 milliGRAM(s) Oral daily  tiotropium 2.5 MICROgram(s) Inhaler 2 Puff(s) Inhalation daily    MEDICATIONS  (PRN):  acetaminophen     Tablet .. 650 milliGRAM(s) Oral every 6 hours PRN Temp greater or equal to 38C (100.4F), Mild Pain (1 - 3)  albuterol    90 MICROgram(s) HFA Inhaler 2 Puff(s) Inhalation every 6 hours PRN for shortness of breath and/or wheezing  albuterol    90 MICROgram(s) HFA Inhaler 1 Puff(s) Inhalation every 6 hours PRN Shortness of Breath and/or Wheezing  albuterol/ipratropium for Nebulization 3 milliLiter(s) Nebulizer every 6 hours PRN Shortness of Breath and/or Wheezing  aluminum hydroxide/magnesium hydroxide/simethicone Suspension 30 milliLiter(s) Oral every 4 hours PRN Dyspepsia  benzocaine/menthol Lozenge 1 Lozenge Oral every 3 hours PRN Sore Throat  dextrose Oral Gel 15 Gram(s) Oral once PRN Blood Glucose LESS THAN 70 milliGRAM(s)/deciliter  guaiFENesin Oral Liquid (Sugar-Free) 200 milliGRAM(s) Oral every 6 hours PRN Cough  meclizine 25 milliGRAM(s) Oral daily PRN for dizziness  melatonin 3 milliGRAM(s) Oral at bedtime PRN Insomnia  ondansetron Injectable 4 milliGRAM(s) IV Push every 8 hours PRN Nausea and/or Vomiting      LABS:                        10.8   3.38  )-----------( 142      ( 27 May 2024 05:30 )             35.3     05-27    139  |  105  |  11  ----------------------------<  195<H>  3.2<L>   |  27  |  0.93    Ca    9.1      27 May 2024 05:30    TPro  6.6  /  Alb  2.9<L>  /  TBili  0.3  /  DBili  x   /  AST  24  /  ALT  38  /  AlkPhos  86  05-27      Urinalysis Basic - ( 27 May 2024 05:30 )    Color: x / Appearance: x / SG: x / pH: x  Gluc: 195 mg/dL / Ketone: x  / Bili: x / Urobili: x   Blood: x / Protein: x / Nitrite: x   Leuk Esterase: x / RBC: x / WBC x   Sq Epi: x / Non Sq Epi: x / Bacteria: x        CAPILLARY BLOOD GLUCOSE      POCT Blood Glucose.: 161 mg/dL (27 May 2024 11:49)  POCT Blood Glucose.: 172 mg/dL (27 May 2024 07:48)      CAPILLARY BLOOD GLUCOSE      POCT Blood Glucose.: 161 mg/dL (27 May 2024 11:49)  POCT Blood Glucose.: 172 mg/dL (27 May 2024 07:48)    CAPILLARY BLOOD GLUCOSE      POCT Blood Glucose.: 161 mg/dL (27 May 2024 11:49)            RECENT CULTURES:      RADIOLOGY & ADDITIONAL TESTS:                        DVT/GI ppx  Discussed with pt @ bedside
Date/Time Patient Seen:  		  Referring MD:   Data Reviewed	       Patient is a 58y old  Female who presents with a chief complaint of Viral PNA with possible superimposed bacterial PNA (28 May 2024 12:57)      Subjective/HPI     PAST MEDICAL & SURGICAL HISTORY:  Benign Essential Hypertension    GERD (Gastroesophageal Reflux Disease)    Asthma    Right-sided carotid artery disease    MVP (mitral valve prolapse)    HTN (hypertension)    DM (diabetes mellitus)    High cholesterol    C Section    Bilateral Inguinal Hernia (BIH)          Medication list         MEDICATIONS  (STANDING):  aspirin enteric coated 81 milliGRAM(s) Oral daily  atorvastatin 20 milliGRAM(s) Oral at bedtime  azithromycin  IVPB 500 milliGRAM(s) IV Intermittent every 24 hours  budesonide  80 MICROgram(s)/formoterol 4.5 MICROgram(s) Inhaler 2 Puff(s) Inhalation two times a day  cefTRIAXone   IVPB 1000 milliGRAM(s) IV Intermittent every 24 hours  dextrose 10% Bolus 125 milliLiter(s) IV Bolus once  dextrose 5%. 1000 milliLiter(s) (50 mL/Hr) IV Continuous <Continuous>  dextrose 5%. 1000 milliLiter(s) (100 mL/Hr) IV Continuous <Continuous>  dextrose 50% Injectable 25 Gram(s) IV Push once  dextrose 50% Injectable 12.5 Gram(s) IV Push once  enoxaparin Injectable 40 milliGRAM(s) SubCutaneous every 12 hours  glucagon  Injectable 1 milliGRAM(s) IntraMuscular once  insulin lispro (ADMELOG) corrective regimen sliding scale   SubCutaneous three times a day before meals  insulin lispro (ADMELOG) corrective regimen sliding scale   SubCutaneous at bedtime  predniSONE   Tablet 20 milliGRAM(s) Oral daily  propranolol 40 milliGRAM(s) Oral daily    MEDICATIONS  (PRN):  acetaminophen     Tablet .. 650 milliGRAM(s) Oral every 6 hours PRN Temp greater or equal to 38C (100.4F), Mild Pain (1 - 3)  albuterol    90 MICROgram(s) HFA Inhaler 1 Puff(s) Inhalation every 6 hours PRN Shortness of Breath and/or Wheezing  albuterol    90 MICROgram(s) HFA Inhaler 2 Puff(s) Inhalation every 6 hours PRN for shortness of breath and/or wheezing  albuterol/ipratropium for Nebulization 3 milliLiter(s) Nebulizer every 6 hours PRN Shortness of Breath and/or Wheezing  aluminum hydroxide/magnesium hydroxide/simethicone Suspension 30 milliLiter(s) Oral every 4 hours PRN Dyspepsia  benzocaine/menthol Lozenge 1 Lozenge Oral every 3 hours PRN Sore Throat  dextrose Oral Gel 15 Gram(s) Oral once PRN Blood Glucose LESS THAN 70 milliGRAM(s)/deciliter  guaiFENesin Oral Liquid (Sugar-Free) 200 milliGRAM(s) Oral every 6 hours PRN Cough  meclizine 25 milliGRAM(s) Oral daily PRN for dizziness  melatonin 3 milliGRAM(s) Oral at bedtime PRN Insomnia  ondansetron Injectable 4 milliGRAM(s) IV Push every 8 hours PRN Nausea and/or Vomiting         Vitals log        ICU Vital Signs Last 24 Hrs  T(C): 36.8 (29 May 2024 01:15), Max: 37.2 (28 May 2024 14:22)  T(F): 98.3 (29 May 2024 01:15), Max: 98.9 (28 May 2024 14:22)  HR: 78 (29 May 2024 01:15) (75 - 96)  BP: 119/75 (29 May 2024 01:15) (110/73 - 153/89)  BP(mean): --  ABP: --  ABP(mean): --  RR: 18 (29 May 2024 01:15) (16 - 18)  SpO2: 97% (29 May 2024 01:15) (95% - 97%)    O2 Parameters below as of 29 May 2024 01:15  Patient On (Oxygen Delivery Method): room air                 Input and Output:  I&O's Detail      Lab Data                        10.8   3.38  )-----------( 142      ( 27 May 2024 05:30 )             35.3     05-27    139  |  105  |  11  ----------------------------<  195<H>  3.2<L>   |  27  |  0.93    Ca    9.1      27 May 2024 05:30    TPro  6.6  /  Alb  2.9<L>  /  TBili  0.3  /  DBili  x   /  AST  24  /  ALT  38  /  AlkPhos  86  05-27            Review of Systems	      Objective     Physical Examination    head nc  head at  heart s1s2  lung dc BS      Pertinent Lab findings & Imaging      Franko:  NO   Adequate UO     I&O's Detail           Discussed with:     Cultures:	        Radiology                            
Subjective: She states she was having some difficulty with breathing overnight and some wheezing along with difficulty ambulating. Feeling better now and resting in bed comfortably and as long as she doesn't move much states she is fine. On Room Air.     MEDICATIONS  (STANDING):  aspirin enteric coated 81 milliGRAM(s) Oral daily  atorvastatin 20 milliGRAM(s) Oral at bedtime  azithromycin  IVPB 500 milliGRAM(s) IV Intermittent every 24 hours  budesonide  80 MICROgram(s)/formoterol 4.5 MICROgram(s) Inhaler 2 Puff(s) Inhalation two times a day  cefTRIAXone   IVPB 1000 milliGRAM(s) IV Intermittent every 24 hours  dextrose 10% Bolus 125 milliLiter(s) IV Bolus once  dextrose 5%. 1000 milliLiter(s) (50 mL/Hr) IV Continuous <Continuous>  dextrose 5%. 1000 milliLiter(s) (100 mL/Hr) IV Continuous <Continuous>  dextrose 50% Injectable 25 Gram(s) IV Push once  dextrose 50% Injectable 12.5 Gram(s) IV Push once  enoxaparin Injectable 40 milliGRAM(s) SubCutaneous every 12 hours  glucagon  Injectable 1 milliGRAM(s) IntraMuscular once  insulin lispro (ADMELOG) corrective regimen sliding scale   SubCutaneous three times a day before meals  insulin lispro (ADMELOG) corrective regimen sliding scale   SubCutaneous at bedtime  predniSONE   Tablet 20 milliGRAM(s) Oral daily  propranolol 40 milliGRAM(s) Oral daily    MEDICATIONS  (PRN):  acetaminophen     Tablet .. 650 milliGRAM(s) Oral every 6 hours PRN Temp greater or equal to 38C (100.4F), Mild Pain (1 - 3)  albuterol    90 MICROgram(s) HFA Inhaler 2 Puff(s) Inhalation every 6 hours PRN for shortness of breath and/or wheezing  albuterol    90 MICROgram(s) HFA Inhaler 1 Puff(s) Inhalation every 6 hours PRN Shortness of Breath and/or Wheezing  albuterol/ipratropium for Nebulization 3 milliLiter(s) Nebulizer every 6 hours PRN Shortness of Breath and/or Wheezing  aluminum hydroxide/magnesium hydroxide/simethicone Suspension 30 milliLiter(s) Oral every 4 hours PRN Dyspepsia  benzocaine/menthol Lozenge 1 Lozenge Oral every 3 hours PRN Sore Throat  dextrose Oral Gel 15 Gram(s) Oral once PRN Blood Glucose LESS THAN 70 milliGRAM(s)/deciliter  guaiFENesin Oral Liquid (Sugar-Free) 200 milliGRAM(s) Oral every 6 hours PRN Cough  meclizine 25 milliGRAM(s) Oral daily PRN for dizziness  melatonin 3 milliGRAM(s) Oral at bedtime PRN Insomnia  ondansetron Injectable 4 milliGRAM(s) IV Push every 8 hours PRN Nausea and/or Vomiting      Allergies    No Known Drug Allergies  shellfish (Hives)  seasonal (Sneezing)  shellfish (Unknown)    Intolerances        Vital Signs Last 24 Hrs  T(C): 36.7 (28 May 2024 10:26), Max: 36.9 (28 May 2024 05:49)  T(F): 98 (28 May 2024 10:26), Max: 98.4 (28 May 2024 05:49)  HR: 86 (28 May 2024 10:26) (86 - 96)  BP: 153/89 (28 May 2024 10:26) (125/65 - 153/89)  BP(mean): --  RR: 18 (28 May 2024 10:26) (16 - 18)  SpO2: 95% (28 May 2024 10:26) (95% - 96%)    Parameters below as of 28 May 2024 10:26  Patient On (Oxygen Delivery Method): room air        PHYSICAL EXAM:  GENERAL: NAD, well-groomed, well-developed  HEAD:  Atraumatic, Normocephalic  ENMT: Moist mucous membranes,   NECK: Supple, No JVD, Normal thyroid  NERVOUS SYSTEM:  All 4 extremities mobile, no gross sensory deficits.   CHEST/LUNG: Clear to auscultation bilaterally; No rales, rhonchi, wheezing, or rubs  HEART: Regular rate and rhythm; No murmurs, rubs, or gallops  ABDOMEN: Soft, Nontender, Nondistended; Bowel sounds present  EXTREMITIES:  2+ Peripheral Pulses, No clubbing, cyanosis, or edema      LABS:      Ca    9.1        27 May 2024 05:30        Urinalysis Basic - ( 27 May 2024 05:30 )    Color: x / Appearance: x / SG: x / pH: x  Gluc: 195 mg/dL / Ketone: x  / Bili: x / Urobili: x   Blood: x / Protein: x / Nitrite: x   Leuk Esterase: x / RBC: x / WBC x   Sq Epi: x / Non Sq Epi: x / Bacteria: x      CAPILLARY BLOOD GLUCOSE      POCT Blood Glucose.: 144 mg/dL (28 May 2024 11:54)  POCT Blood Glucose.: 132 mg/dL (28 May 2024 08:12)  POCT Blood Glucose.: 153 mg/dL (27 May 2024 21:44)  POCT Blood Glucose.: 151 mg/dL (27 May 2024 17:10)      RADIOLOGY & ADDITIONAL TESTS:    Imaging Personally Reviewed:  [ ] YES     Consultant(s) Notes Reviewed:      Care Discussed with Consultants/Other Providers:    Advanced Directives: [ ] DNR  [ ] No feeding tube  [ ] MOLST in chart  [ ] MOLST completed today  [ ] Unknown

## 2024-05-29 NOTE — DIETITIAN INITIAL EVALUATION ADULT - LITERATURE/VIDEOS GIVEN
provided diet education regarding consistent CHO nutrition therapy and weight loss education. encouraged consumption of mixed meals, identified carbohydrate containing foods and portion sizes, and benefits of incorporating physical activity. all questions addressed at this time.

## 2024-05-29 NOTE — DIETITIAN INITIAL EVALUATION ADULT - ETIOLOGY
likely related to excessive energy intake, decreased physical activity limited prior exposure to nutrition related information

## 2024-05-29 NOTE — DIETITIAN NUTRITION RISK NOTIFICATION - TREATMENT: THE FOLLOWING DIET HAS BEEN RECOMMENDED
Diet, Consistent Carbohydrate w/Evening Snack:   DASH/TLC {Sodium & Cholesterol Restricted} (05-27-24 @ 06:21) [Active]

## 2024-05-29 NOTE — DISCHARGE NOTE PROVIDER - HOSPITAL COURSE
58F HTN, HLD, DM2, and Asthma admitted for B/L Pneumonia and Acute Asthma Exacerbation     Acute Asthma Exacerbation / B/L PNA  Related to underlying Para-Influenza infection and B/L Lower Lobe PNA confirmed on CT imaging  On room air but labored breathing at times   Ceftriaxone + Azithromycin + Prednisone + Symbicort + Albuterol   Pulmonary Consult noted     HTN / HLD  BP slight elevated   On Propranolol + Losartan + HCTZ + Statin    Diabetes Mellitus 2   Hold Oral Meds and Expect BS to be elevated while on steroids  A1C was 5.5   ISS for coverage; Maintain BS < 180    Disposition   Discharge home

## 2024-05-29 NOTE — DIETITIAN INITIAL EVALUATION ADULT - ORAL INTAKE PTA/DIET HISTORY
patient reports generally consuming 1 meal/day (dinner)may be something like rice and beans, chicken or fish with vegetables, avocado toast with hard boiled eggs. snack foods consist of bananas, cheese, bread, potato chips. she drinks mainly water, and her spouse at bedside reports that they just bought a soda stream and they plan on making their own seltzer.

## 2024-05-29 NOTE — DISCHARGE NOTE PROVIDER - NSDCCPCAREPLAN_GEN_ALL_CORE_FT
PRINCIPAL DISCHARGE DIAGNOSIS  Diagnosis: Community acquired bilateral lower lobe pneumonia  Assessment and Plan of Treatment: You were found to have an infection of your lungs. This was found on CT imaging that was done here while you were admitted. After evaluation by Infectious Disease and our Pulmonologist you were prescribed antibiotics. You will need to complete your course at home.      SECONDARY DISCHARGE DIAGNOSES  Diagnosis: Parainfluenza  Assessment and Plan of Treatment: Your Pneumonia was caused by Parainfluenza    Diagnosis: Acute asthma exacerbation  Assessment and Plan of Treatment: Your underlying Asthma was worsened due to the infection.

## 2024-05-29 NOTE — DISCHARGE NOTE PROVIDER - DETAILS OF MALNUTRITION DIAGNOSIS/DIAGNOSES
This patient has been assessed with a concern for Malnutrition and was treated during this hospitalization for the following Nutrition diagnosis/diagnoses:     -  05/29/2024: Morbid obesity (BMI > 40)

## 2024-05-29 NOTE — DISCHARGE NOTE PROVIDER - NSDCMRMEDTOKEN_GEN_ALL_CORE_FT
Advair Diskus 250 mcg-50 mcg inhalation powder: 1 inhaled 2 times a day  albuterol 90 mcg/inh inhalation aerosol: 2 puff(s) inhaled every 6 hours as needed for  shortness of breath and/or wheezing  Aspir 81 oral delayed release tablet: 1 tab(s) orally once a day  atorvastatin 20 mg oral tablet: 1 tab(s) orally once a day (at bedtime)  cefpodoxime 200 mg oral tablet: 1 tab(s) orally 2 times a day  losartan 100 mg oral tablet: 1 tab(s) orally once a day  metFORMIN 1000 mg oral tablet: 1 tab(s) orally 2 times a day  predniSONE 20 mg oral tablet: 1 tab(s) orally once a day  propranolol 60 mg oral tablet: 1 tab(s) orally once a day

## 2024-05-29 NOTE — DISCHARGE NOTE NURSING/CASE MANAGEMENT/SOCIAL WORK - PATIENT PORTAL LINK FT
You can access the FollowMyHealth Patient Portal offered by [facility] by registering at the following website: [email or web address]. By joining [facility]’s FollowMyHealth portal, you will also be able to view your health information using other applications (apps) compatible with our system.

## 2024-05-29 NOTE — DISCHARGE NOTE NURSING/CASE MANAGEMENT/SOCIAL WORK - NSDCPEFALRISK_GEN_ALL_CORE
For information on Fall & Injury Prevention, visit: [email or web address] OR  [email or web address] OR  https://www.cdc.gov/steadi/patient.html

## 2024-05-29 NOTE — DIETITIAN INITIAL EVALUATION ADULT - PERTINENT LABORATORY DATA
05-29    144  |  107  |  24<H>  ----------------------------<  125<H>  3.8   |  27  |  1.01    Ca    9.7      29 May 2024 06:30    POCT Blood Glucose.: 126 mg/dL (05-29-24 @ 08:03)  A1C with Estimated Average Glucose Result: 5.7 % (05-28-24 @ 06:00)  A1C with Estimated Average Glucose Result: 5.5 % (05-27-24 @ 10:32)

## 2024-05-29 NOTE — DIETITIAN INITIAL EVALUATION ADULT - PERTINENT MEDS FT
MEDICATIONS  (STANDING):  aspirin enteric coated 81 milliGRAM(s) Oral daily  atorvastatin 20 milliGRAM(s) Oral at bedtime  azithromycin  IVPB 500 milliGRAM(s) IV Intermittent every 24 hours  budesonide  80 MICROgram(s)/formoterol 4.5 MICROgram(s) Inhaler 2 Puff(s) Inhalation two times a day  cefTRIAXone   IVPB 1000 milliGRAM(s) IV Intermittent every 24 hours  dextrose 10% Bolus 125 milliLiter(s) IV Bolus once  dextrose 5%. 1000 milliLiter(s) (100 mL/Hr) IV Continuous <Continuous>  dextrose 5%. 1000 milliLiter(s) (50 mL/Hr) IV Continuous <Continuous>  dextrose 50% Injectable 25 Gram(s) IV Push once  dextrose 50% Injectable 12.5 Gram(s) IV Push once  enoxaparin Injectable 40 milliGRAM(s) SubCutaneous every 12 hours  glucagon  Injectable 1 milliGRAM(s) IntraMuscular once  insulin lispro (ADMELOG) corrective regimen sliding scale   SubCutaneous three times a day before meals  insulin lispro (ADMELOG) corrective regimen sliding scale   SubCutaneous at bedtime  predniSONE   Tablet 20 milliGRAM(s) Oral daily  propranolol 40 milliGRAM(s) Oral daily    MEDICATIONS  (PRN):  acetaminophen     Tablet .. 650 milliGRAM(s) Oral every 6 hours PRN Temp greater or equal to 38C (100.4F), Mild Pain (1 - 3)  albuterol    90 MICROgram(s) HFA Inhaler 1 Puff(s) Inhalation every 6 hours PRN Shortness of Breath and/or Wheezing  albuterol    90 MICROgram(s) HFA Inhaler 2 Puff(s) Inhalation every 6 hours PRN for shortness of breath and/or wheezing  albuterol/ipratropium for Nebulization 3 milliLiter(s) Nebulizer every 6 hours PRN Shortness of Breath and/or Wheezing  aluminum hydroxide/magnesium hydroxide/simethicone Suspension 30 milliLiter(s) Oral every 4 hours PRN Dyspepsia  benzocaine/menthol Lozenge 1 Lozenge Oral every 3 hours PRN Sore Throat  dextrose Oral Gel 15 Gram(s) Oral once PRN Blood Glucose LESS THAN 70 milliGRAM(s)/deciliter  guaiFENesin Oral Liquid (Sugar-Free) 200 milliGRAM(s) Oral every 6 hours PRN Cough  meclizine 25 milliGRAM(s) Oral daily PRN for dizziness  melatonin 3 milliGRAM(s) Oral at bedtime PRN Insomnia  ondansetron Injectable 4 milliGRAM(s) IV Push every 8 hours PRN Nausea and/or Vomiting

## 2024-06-01 LAB
CULTURE RESULTS: SIGNIFICANT CHANGE UP
CULTURE RESULTS: SIGNIFICANT CHANGE UP
SPECIMEN SOURCE: SIGNIFICANT CHANGE UP
SPECIMEN SOURCE: SIGNIFICANT CHANGE UP

## 2024-10-11 NOTE — ED PROVIDER NOTE - NSFOLLOWUPINSTRUCTIONS_ED_ALL_ED_FT
Female
1)  Follow-up with your Primary Medical Doctor or referred doctor. Call today / next business day for prompt follow-up.  2) Follow-up with Cardiology as discussed. Call today / next business day for prompt follow-up and further workup and evaluation.  3) Return to Emergency room for any worsening or persistent pain, shortness of breath, weakness, fever, abdominal pain, dizziness, passing out, unexplained pain in arms, legs, back, any vomiting, feeling like your heart is racing,  or any other concerning symptoms.  4) See attached instruction sheets for additional information, including information regarding signs and symptoms to look out for, reasons to seek immediate care and other important instructions.  5) Aspirin 325mg once daily    1) Seguimiento con carbajal médico de cabecera o médico referido. Llame hoy / el siguiente día hábil para el seguimiento rápido.  2) Seguimiento de Cardiología charisse se discutió. Llame hoy / siguiente día hábil para un seguimiento rápido y más ejercicio y evaluación.  3) Regrese a la cory de emergencias para cualquier empeoramiento o dolor persistente, dificultad para respirar, debilidad, fiebre, dolor abdominal, mareos, desmayos, dolor inexplicable en brazos, piernas, espalda, cualquier vómito, sensación de que carbajal corazón está corriendo, o cualquier otro problema con los síntomas.  4) Consulte las hojas de instrucciones adjuntas para obtener información adicional, incluyendo información sobre los signos y síntomas a tener en cuenta, razones para buscar atención inmediata y otras instrucciones importantes.  5) Aspirina 325mg velia vez al día

## 2024-12-24 NOTE — ED ADULT NURSE NOTE - NSFALLLASTSIX_ED_ALL_ED
[facility] dispatch was notified of a BLS transport from [facility] ED bed #9 to pt's private residence for home discharge. Reason for transport is that the pt is unable to get in to residence do to bilateral leg pain and there are 6 steps to get in to residence.   No.

## 2024-12-30 NOTE — ED PROVIDER NOTE - CARDIAC, MLM
- Status post treatment with azacitidine 75 mg/m2 daily x7 days plus venetoclax 100mg (voriconazole) daily x14 of 28 days.Venetoclax decreased to 7 days with cycle 3 until completion of 11 cycles. S/p Haplo BMT as above. No plans for maintenance at this time.    Normal rate, regular rhythm.  Heart sounds S1, S2.  No murmurs, rubs or gallops.

## 2025-03-20 NOTE — ED PROVIDER NOTE - BREATH SOUNDS
Left v/m letting Pt know we would like her to come in at 12 today   on forced expiration/WHEEZES Luis Bright

## 2025-04-10 NOTE — ED PROVIDER NOTE - NSTIMEPROVIDERCAREINITIATE_GEN_ER
-Would recommend methotrexate therapy for treatment of ectopic pregnancy.    -Patient counseled on methotrexate therapy as treatment for ectopic pregnancy.  Common side effects of the medication as well as restrictions while on the medication were reviewed with patient and patient signed methotrexate information sheet that was placed in her chart.    -Reviewed with patient the 15-20% methotrexate failure rate and possibility of necessity for additional dose of methotrexate.   -Consents for methotrexate signed with patient with attending at bedside.    -Chemotherapy drug order form sent to pharmacy with methotrexate dose calculated based on BSA for patient.   -Patient given strict precautions to call her physician or return to ED if she experiences any severe abdominal pain, dizziness, lightheadedness, severe n/v, or any heavy vaginal bleeding >2 pads/hour for >2 hours.    -Patient instructed on need for follow up of b-hcg on day 4 and day 7 of methotrexate therapy.  Patient intends to follow up in the ED or GYN office on (*****) and (*****)  -Once patient receives methotrexate therapy she is cleared for discharge from OBGYN perspective.  Primary managment per ED team.  A/P: 20y  LMP  @6w5d presenting due to lower abdominal cramping and vaginal spotting. States she has had mild discomfort for 1 week. Got PO Tylenol in ED and discomfort has now entirely resolved. Reports mild vaginal spotting for the last week. Patient was unaware she was pregnant prior to presentation and this is an unplanned undesired pregnancy as the result of consensual intercourse with her boyfriend. Patient had been using condoms. Vital signs wnl. H/H appropriate. bHCG 298 here. TVUS without IUP, left adnexal hypoechoic structure with vascularity approriately 3cm concerning for ectopic pregnancy. Counseled regarding diagnosis of pregnancy of unknown location however given low bHCG given gestational age as well as ultrasound findings, high suspicion for ectopic pregnancy. Trace free fluid. Offered expectant versus methotrexate versus surgery. Patient would like methotrexate and is an appropriate candidate given low concern for rupture.  -Would recommend methotrexate therapy for treatment of ectopic pregnancy.    -Patient counseled on methotrexate therapy as treatment for ectopic pregnancy.  Common side effects of the medication as well as restrictions while on the medication were reviewed with patient and patient signed methotrexate information sheet that was placed in her chart.    -Reviewed with patient the 15-20% methotrexate failure rate and possibility of necessity for additional dose of methotrexate.   -Consents for methotrexate signed with patient with attending at bedside.    -Chemotherapy drug order form sent to pharmacy with methotrexate dose calculated based on BSA for patient.   -Patient given strict precautions to call her physician or return to ED if she experiences any severe abdominal pain, dizziness, lightheadedness, severe n/v, or any heavy vaginal bleeding >2 pads/hour for >2 hours.    -Patient instructed on need for follow up of b-hcg on day 4 and day 7 of methotrexate therapy.  Patient intends to follow up in the ED or GYN office on () and ()  -Once patient receives methotrexate therapy she is cleared for discharge from OBGYN perspective.  Primary management per ED team.    Seen and d/w Dr. Kiran Aranda PGY-2  A/P: 20y  LMP  @6w5d presenting due to lower abdominal cramping and vaginal spotting. States she has had mild discomfort for 1 week. Got PO Tylenol in ED and discomfort has now entirely resolved. Reports mild vaginal spotting for the last week. Patient was unaware she was pregnant prior to presentation and this is an unplanned undesired pregnancy as the result of consensual intercourse with her boyfriend. Patient had been using condoms. Vital signs wnl. H/H appropriate. bHCG 298 here. TVUS without IUP, left adnexal hypoechoic structure with vascularity appropriately 3cm concerning for ectopic pregnancy. Counseled regarding diagnosis of pregnancy of unknown location however given low bHCG given gestational age as well as ultrasound findings, high suspicion for ectopic pregnancy. Trace free fluid. Offered expectant versus methotrexate versus surgery. Patient would like methotrexate and is an appropriate candidate given low concern for rupture.  -Would recommend methotrexate therapy for treatment of ectopic pregnancy.    -Patient counseled on methotrexate therapy as treatment for ectopic pregnancy.  Common side effects of the medication as well as restrictions while on the medication were reviewed with patient and patient signed methotrexate information sheet that was placed in her chart.    -Reviewed with patient the 15-20% methotrexate failure rate and possibility of necessity for additional dose of methotrexate.   -Consents for methotrexate signed with patient with attending at bedside.    -Chemotherapy drug order form sent to pharmacy with methotrexate dose calculated based on BSA for patient.   -Patient given strict precautions to call her physician or return to ED if she experiences any severe abdominal pain, dizziness, lightheadedness, severe n/v, or any heavy vaginal bleeding >2 pads/hour for >2 hours.    -Patient instructed on need for follow up of b-hcg on day 4 and day 7 of methotrexate therapy.  Patient intends to follow up in the ED or GYN office on () and ()  -Once patient receives methotrexate therapy she is cleared for discharge from OBGYN perspective.  Primary management per ED team.    Seen and d/w Dr. Kiran Aranda PGY-2  24-Aug-2020 21:17